# Patient Record
Sex: FEMALE | Race: WHITE | NOT HISPANIC OR LATINO | Employment: FULL TIME | URBAN - METROPOLITAN AREA
[De-identification: names, ages, dates, MRNs, and addresses within clinical notes are randomized per-mention and may not be internally consistent; named-entity substitution may affect disease eponyms.]

---

## 2017-04-18 ENCOUNTER — HOSPITAL ENCOUNTER (EMERGENCY)
Facility: MEDICAL CENTER | Age: 27
End: 2017-04-18
Attending: EMERGENCY MEDICINE
Payer: MEDICAID

## 2017-04-18 ENCOUNTER — APPOINTMENT (OUTPATIENT)
Dept: RADIOLOGY | Facility: MEDICAL CENTER | Age: 27
End: 2017-04-18
Attending: EMERGENCY MEDICINE
Payer: MEDICAID

## 2017-04-18 VITALS
WEIGHT: 204.81 LBS | SYSTOLIC BLOOD PRESSURE: 133 MMHG | HEART RATE: 83 BPM | TEMPERATURE: 99.2 F | DIASTOLIC BLOOD PRESSURE: 67 MMHG | OXYGEN SATURATION: 95 % | HEIGHT: 70 IN | BODY MASS INDEX: 29.32 KG/M2 | RESPIRATION RATE: 18 BRPM

## 2017-04-18 DIAGNOSIS — R56.9 SEIZURE (HCC): ICD-10-CM

## 2017-04-18 LAB
ALBUMIN SERPL BCP-MCNC: 4.3 G/DL (ref 3.2–4.9)
ALBUMIN/GLOB SERPL: 1.2 G/DL
ALP SERPL-CCNC: 65 U/L (ref 30–99)
ALT SERPL-CCNC: 24 U/L (ref 2–50)
ANION GAP SERPL CALC-SCNC: 8 MMOL/L (ref 0–11.9)
APPEARANCE UR: CLEAR
AST SERPL-CCNC: 30 U/L (ref 12–45)
BASOPHILS # BLD AUTO: 0.8 % (ref 0–1.8)
BASOPHILS # BLD: 0.09 K/UL (ref 0–0.12)
BILIRUB SERPL-MCNC: 1.1 MG/DL (ref 0.1–1.5)
BILIRUB UR QL STRIP.AUTO: NEGATIVE
BUN SERPL-MCNC: 10 MG/DL (ref 8–22)
CALCIUM SERPL-MCNC: 9.1 MG/DL (ref 8.4–10.2)
CHLORIDE SERPL-SCNC: 102 MMOL/L (ref 96–112)
CO2 SERPL-SCNC: 24 MMOL/L (ref 20–33)
COLOR UR: YELLOW
CREAT SERPL-MCNC: 0.79 MG/DL (ref 0.5–1.4)
CULTURE IF INDICATED INDCX: NO UA CULTURE
EKG IMPRESSION: NORMAL
EOSINOPHIL # BLD AUTO: 0.18 K/UL (ref 0–0.51)
EOSINOPHIL NFR BLD: 1.6 % (ref 0–6.9)
ERYTHROCYTE [DISTWIDTH] IN BLOOD BY AUTOMATED COUNT: 39.5 FL (ref 35.9–50)
GFR SERPL CREATININE-BSD FRML MDRD: >60 ML/MIN/1.73 M 2
GLOBULIN SER CALC-MCNC: 3.5 G/DL (ref 1.9–3.5)
GLUCOSE SERPL-MCNC: 101 MG/DL (ref 65–99)
GLUCOSE UR STRIP.AUTO-MCNC: NEGATIVE MG/DL
HCG SERPL QL: NEGATIVE
HCT VFR BLD AUTO: 42.1 % (ref 37–47)
HGB BLD-MCNC: 14.5 G/DL (ref 12–16)
IMM GRANULOCYTES # BLD AUTO: 0.03 K/UL (ref 0–0.11)
IMM GRANULOCYTES NFR BLD AUTO: 0.3 % (ref 0–0.9)
KETONES UR STRIP.AUTO-MCNC: NEGATIVE MG/DL
LEUKOCYTE ESTERASE UR QL STRIP.AUTO: NEGATIVE
LYMPHOCYTES # BLD AUTO: 2.91 K/UL (ref 1–4.8)
LYMPHOCYTES NFR BLD: 25.8 % (ref 22–41)
MCH RBC QN AUTO: 30.5 PG (ref 27–33)
MCHC RBC AUTO-ENTMCNC: 34.4 G/DL (ref 33.6–35)
MCV RBC AUTO: 88.4 FL (ref 81.4–97.8)
MICRO URNS: NORMAL
MONOCYTES # BLD AUTO: 1.11 K/UL (ref 0–0.85)
MONOCYTES NFR BLD AUTO: 9.8 % (ref 0–13.4)
NEUTROPHILS # BLD AUTO: 6.95 K/UL (ref 2–7.15)
NEUTROPHILS NFR BLD: 61.7 % (ref 44–72)
NITRITE UR QL STRIP.AUTO: NEGATIVE
NRBC # BLD AUTO: 0 K/UL
NRBC BLD AUTO-RTO: 0 /100 WBC
PH UR STRIP.AUTO: 5.5 [PH]
PLATELET # BLD AUTO: 395 K/UL (ref 164–446)
PMV BLD AUTO: 10.1 FL (ref 9–12.9)
POTASSIUM SERPL-SCNC: 5 MMOL/L (ref 3.6–5.5)
PROT SERPL-MCNC: 7.8 G/DL (ref 6–8.2)
PROT UR QL STRIP: NEGATIVE MG/DL
RBC # BLD AUTO: 4.76 M/UL (ref 4.2–5.4)
RBC UR QL AUTO: NEGATIVE
SODIUM SERPL-SCNC: 134 MMOL/L (ref 135–145)
SP GR UR STRIP.AUTO: <=1.005
TROPONIN I SERPL-MCNC: <0.02 NG/ML (ref 0–0.04)
WBC # BLD AUTO: 11.3 K/UL (ref 4.8–10.8)

## 2017-04-18 PROCEDURE — 93005 ELECTROCARDIOGRAM TRACING: CPT

## 2017-04-18 PROCEDURE — 80053 COMPREHEN METABOLIC PANEL: CPT

## 2017-04-18 PROCEDURE — 70450 CT HEAD/BRAIN W/O DYE: CPT

## 2017-04-18 PROCEDURE — 700105 HCHG RX REV CODE 258: Performed by: EMERGENCY MEDICINE

## 2017-04-18 PROCEDURE — 94760 N-INVAS EAR/PLS OXIMETRY 1: CPT

## 2017-04-18 PROCEDURE — 81003 URINALYSIS AUTO W/O SCOPE: CPT

## 2017-04-18 PROCEDURE — 96365 THER/PROPH/DIAG IV INF INIT: CPT

## 2017-04-18 PROCEDURE — 700111 HCHG RX REV CODE 636 W/ 250 OVERRIDE (IP): Performed by: EMERGENCY MEDICINE

## 2017-04-18 PROCEDURE — 85025 COMPLETE CBC W/AUTO DIFF WBC: CPT

## 2017-04-18 PROCEDURE — 96375 TX/PRO/DX INJ NEW DRUG ADDON: CPT

## 2017-04-18 PROCEDURE — 84484 ASSAY OF TROPONIN QUANT: CPT

## 2017-04-18 PROCEDURE — 99285 EMERGENCY DEPT VISIT HI MDM: CPT

## 2017-04-18 PROCEDURE — 36415 COLL VENOUS BLD VENIPUNCTURE: CPT

## 2017-04-18 PROCEDURE — 84703 CHORIONIC GONADOTROPIN ASSAY: CPT

## 2017-04-18 RX ORDER — SODIUM CHLORIDE 9 MG/ML
1000 INJECTION, SOLUTION INTRAVENOUS ONCE
Status: COMPLETED | OUTPATIENT
Start: 2017-04-18 | End: 2017-04-18

## 2017-04-18 RX ORDER — ACETAMINOPHEN 325 MG/1
650 TABLET ORAL EVERY 6 HOURS PRN
Status: SHIPPED | COMMUNITY
End: 2017-05-01

## 2017-04-18 RX ORDER — LEVETIRACETAM 500 MG/1
1000 TABLET ORAL 2 TIMES DAILY
Status: SHIPPED | COMMUNITY
End: 2018-05-07

## 2017-04-18 RX ORDER — VENLAFAXINE HYDROCHLORIDE 37.5 MG/1
37.5 CAPSULE, EXTENDED RELEASE ORAL EVERY MORNING
Status: SHIPPED | COMMUNITY
End: 2018-05-07

## 2017-04-18 RX ORDER — ONDANSETRON 2 MG/ML
4 INJECTION INTRAMUSCULAR; INTRAVENOUS ONCE
Status: COMPLETED | OUTPATIENT
Start: 2017-04-18 | End: 2017-04-18

## 2017-04-18 RX ADMIN — DEXTROSE MONOHYDRATE 500 MG: 50 INJECTION, SOLUTION INTRAVENOUS at 14:20

## 2017-04-18 RX ADMIN — ONDANSETRON 4 MG: 2 INJECTION, SOLUTION INTRAMUSCULAR; INTRAVENOUS at 13:25

## 2017-04-18 RX ADMIN — SODIUM CHLORIDE 1000 ML: 9 INJECTION, SOLUTION INTRAVENOUS at 13:24

## 2017-04-18 NOTE — ED NOTES
Med rec completed per pt at bedside with RX bottles  Bottles returned to pt's belongings   Allergies reviewed - NKDA  No ABX in last 30 days   Pt states she CANNOT have narcotics

## 2017-04-18 NOTE — DISCHARGE INSTRUCTIONS
Seizure, Adult  A seizure is abnormal electrical activity in the brain. Seizures usually last from 30 seconds to 2 minutes. There are various types of seizures.  Before a seizure, you may have a warning sensation (aura) that a seizure is about to occur. An aura may include the following symptoms:   · Fear or anxiety.  · Nausea.  · Feeling like the room is spinning (vertigo).  · Vision changes, such as seeing flashing lights or spots.  Common symptoms during a seizure include:  · A change in attention or behavior (altered mental status).  · Convulsions with rhythmic jerking movements.  · Drooling.  · Rapid eye movements.  · Grunting.  · Loss of bladder and bowel control.  · Bitter taste in the mouth.  · Tongue biting.  After a seizure, you may feel confused and sleepy. You may also have an injury resulting from convulsions during the seizure.  HOME CARE INSTRUCTIONS   · If you are given medicines, take them exactly as prescribed by your health care provider.  · Keep all follow-up appointments as directed by your health care provider.  · Do not swim or drive or engage in risky activity during which a seizure could cause further injury to you or others until your health care provider says it is OK.  · Get adequate rest.  · Teach friends and family what to do if you have a seizure. They should:  ¨ Lay you on the ground to prevent a fall.  ¨ Put a cushion under your head.  ¨ Loosen any tight clothing around your neck.  ¨ Turn you on your side. If vomiting occurs, this helps keep your airway clear.  ¨ Stay with you until you recover.  ¨ Know whether or not you need emergency care.  SEEK IMMEDIATE MEDICAL CARE IF:  · The seizure lasts longer than 5 minutes.  · The seizure is severe or you do not wake up immediately after the seizure.  · You have an altered mental status after the seizure.  · You are having more frequent or worsening seizures.  Someone should drive you to the emergency department or call local emergency  services (911 in [place]  MAKE SURE YOU:  · Understand these instructions.  · Will watch your condition.  · Will get help right away if you are not doing well or get worse.     This information is not intended to replace advice given to you by your health care provider. Make sure you discuss any questions you have with your health care provider.     Document Released: 12/15/2001 Document Revised: 01/08/2016 Document Reviewed: 07/30/2014  [employer] Interactive Patient Education ©2016 [employer]

## 2017-04-18 NOTE — ED PROVIDER NOTES
ED Provider Note    CHIEF COMPLAINT  Chief Complaint   Patient presents with   • Seizure     hx of same   • Headache     hx of migraine   • Shoulder Pain     left   • Nausea     this am       HPI  Oseas Day is a 26 y.o. female here for evaluation of seizures. The patient states that she recently was released from longterm one week ago, and is currently staying in a [facility] house. She states that she has had 2 seizures earlier today, but has a long history of seizures as well. She currently takes 1000 mg of Keppra twice a day for her seizures. She states that she did have some mild abrasions to the right side of her tongue, and one episode of mild incontinence. She did fall and strike her head on the ground, and does have a mild headache as well. She denies any paresthesias, vomiting, or fever. She came in via ambulance. Again, with a long history of the same.      PAST MEDICAL HISTORY   has a past medical history of Seizure disorder (CMS-HCC).    SOCIAL HISTORY  Social History     Social History Main Topics   • Smoking status: Former Smoker     Types: Cigarettes   • Smokeless tobacco: Not on file   • Alcohol Use: Yes      Comment: none in past 6 years    • Drug Use: Yes     Special: Intravenous      Comment: none in past 6 years   • Sexual Activity: Not on file       SURGICAL HISTORY  patient denies any surgical history    CURRENT MEDICATIONS  Home Medications     Reviewed by Ronald Howell (Pharmacy Tech) on 04/18/17 at 1245  Med List Status: Complete    Medication Last Dose Status    acetaminophen (TYLENOL) 325 MG Tab 4/18/2017 Active    levetiracetam (KEPPRA) 500 MG Tab 4/18/2017 Active    venlafaxine XR (EFFEXOR XR) 37.5 MG CAPSULE SR 24 HR 4/18/2017 Active                ALLERGIES  Allergies   Allergen Reactions   • Other Drug      Pt states NO NARCOTICS       REVIEW OF SYSTEMS  See HPI for further details. Review of systems as above, otherwise all other systems are negative.     PHYSICAL EXAM  VITAL  "SIGNS: /67 mmHg  Pulse 76  Temp(Src) 37.3 °C (99.2 °F)  Resp 14  Ht 1.778 m (5' 10\")  Wt 92.9 kg (204 lb 12.9 oz)  BMI 29.39 kg/m2  SpO2 96%  LMP 03/01/2017    Constitutional: Well appearing patient.  Not toxic, nor ill in appearance.  HEENT: NC/AT.  Extra Ocular Muscles Intact. Posterior pharynx clear, and without exudate. No uvula edema.  Right lateral tongue with superficial abrasion. No active bleeding  Neck: Full range of motion; non tender. no meningismus.  Cardiovascular: Regular heart rate and rhythm.  No murmurs, rubs, nor gallop appreciated.   Back:  Non tender midline.  No obvious step off or deformity.  Thorax & Lungs: Lungs are clear to auscultation with good air movement bilaterally.  No wheeze, rhonchi, nor rales.   Abdomen: Soft, with no tenderness, rebound nor guarding.  No mass, pulsatile mass, nor hepatosplenomegaly appreciated.  Skin: No purpura nor petechia noted.  No rash.  Extremities/Musculoskeletal: No sign of trauma.    Musculoskeletal: Range of motion is intact in all major joints.  Neurologic: Alert & oriented x 3.  CN II-XII grossly intact.   Strength and sensation is intact. Clear speech, appropriate, cooperative. Equal   Psychiatric: Normal affect appropriate for the clinical situation.    Results for orders placed or performed during the hospital encounter of 04/18/17   CBC WITH DIFFERENTIAL   Result Value Ref Range    WBC 11.3 (H) 4.8 - 10.8 K/uL    RBC 4.76 4.20 - 5.40 M/uL    Hemoglobin 14.5 12.0 - 16.0 g/dL    Hematocrit 42.1 37.0 - 47.0 %    MCV 88.4 81.4 - 97.8 fL    MCH 30.5 27.0 - 33.0 pg    MCHC 34.4 33.6 - 35.0 g/dL    RDW 39.5 35.9 - 50.0 fL    Platelet Count 395 164 - 446 K/uL    MPV 10.1 9.0 - 12.9 fL    Neutrophils-Polys 61.70 44.00 - 72.00 %    Lymphocytes 25.80 22.00 - 41.00 %    Monocytes 9.80 0.00 - 13.40 %    Eosinophils 1.60 0.00 - 6.90 %    Basophils 0.80 0.00 - 1.80 %    Immature Granulocytes 0.30 0.00 - 0.90 %    Nucleated RBC 0.00 /100 WBC    " Neutrophils (Absolute) 6.95 2.00 - 7.15 K/uL    Lymphs (Absolute) 2.91 1.00 - 4.80 K/uL    Monos (Absolute) 1.11 (H) 0.00 - 0.85 K/uL    Eos (Absolute) 0.18 0.00 - 0.51 K/uL    Baso (Absolute) 0.09 0.00 - 0.12 K/uL    Immature Granulocytes (abs) 0.03 0.00 - 0.11 K/uL    NRBC (Absolute) 0.00 K/uL   COMP METABOLIC PANEL   Result Value Ref Range    Sodium 134 (L) 135 - 145 mmol/L    Potassium 5.0 3.6 - 5.5 mmol/L    Chloride 102 96 - 112 mmol/L    Co2 24 20 - 33 mmol/L    Anion Gap 8.0 0.0 - 11.9    Glucose 101 (H) 65 - 99 mg/dL    Bun 10 8 - 22 mg/dL    Creatinine 0.79 0.50 - 1.40 mg/dL    Calcium 9.1 8.4 - 10.2 mg/dL    AST(SGOT) 30 12 - 45 U/L    ALT(SGPT) 24 2 - 50 U/L    Alkaline Phosphatase 65 30 - 99 U/L    Total Bilirubin 1.1 0.1 - 1.5 mg/dL    Albumin 4.3 3.2 - 4.9 g/dL    Total Protein 7.8 6.0 - 8.2 g/dL    Globulin 3.5 1.9 - 3.5 g/dL    A-G Ratio 1.2 g/dL   TROPONIN   Result Value Ref Range    Troponin I <0.02 0.00 - 0.04 ng/mL   ESTIMATED GFR   Result Value Ref Range    GFR If African American >60 >60 mL/min/1.73 m 2    GFR If Non African American >60 >60 mL/min/1.73 m 2   URINALYSIS CULTURE, IF INDICATED   Result Value Ref Range    Color Yellow     Character Clear     Specific Gravity <=1.005 <1.035    Ph 5.5 5.0-8.0    Glucose Negative Negative mg/dL    Ketones Negative Negative mg/dL    Protein Negative Negative mg/dL    Bilirubin Negative Negative    Nitrite Negative Negative    Leukocyte Esterase Negative Negative    Occult Blood Negative Negative    Micro Urine Req see below     Culture Indicated No UA Culture   EKG (ER)   Result Value Ref Range    Report       [facility] Emergency Dept.    Test Date:  2017  Pt Name:    ANATOLIY PALACIOS                 Department: E[facility]M  MRN:        [id number]                      [facility]  Gender:     F                            Technician: GREG  :        1990                   Requested By:ER TRIAGE PROTOCOL  Order #:     [id number]                    Reading MD:    Measurements  Intervals                                Axis  Rate:       66                           P:          43  MA:         148                          QRS:        62  QRSD:       84                           T:          26  QT:         392  QTc:        411    Interpretive Statements  SINUS RHYTHM  No previous ECG available for comparison           PROCEDURES     EKG  Normal sinus rhythm at a rate of 66. No ST elevations or depressions. No ectopy noted. As interpreted by me.     MEDICAL RECORD  I have reviewed patient's medical record and pertinent results are listed above.    COURSE & MEDICAL DECISION MAKING  I have reviewed any medical record information, laboratory studies and radiographic results as noted above.    Differential diagnoses include but not limited to: seizure, mass, ich    3:39 PM  At this time, the pt has been seizure free while in the ER.  I gave her an additional dose of keppra here, and she will continue her medications as prescribed.  The pt states that she missed her keppra dose yesterday.  She has a long standing history of seizures, and has been mostly compliant with medications.   She is comfortable going home, and will follow up.  She has no neuro deficits, comfortable, and afebrile.     This patient presents with seizure .  At this time, I have counseled the patient/family regarding their medications, pain control, and follow up.  They will continue their medications, if any, as prescribed.  They will return immediately for any worsening symptoms and/or any other medical concerns.  They will see their doctor, or contact the doctor provided, in 1-2 days for follow up.       FINAL IMPRESSION  1. Seizure (CMS-[facility])          Electronically signed by: Juarez Eubanks, 4/18/2017 1:44 PM

## 2017-04-18 NOTE — ED AVS SNAPSHOT
Billingstreet Access Code: [email or web address]  Expires: 5/18/2017  3:48 PM    Your email address is not on file at [facility].  Email Addresses are required for you to sign up for Billingstreet, please contact [phone] to verify your personal information and to provide your email address prior to attempting to register for Billingstreet.    Oseas Day  [address] [place] [address]    zoomsquaret  A secure, online tool to manage your health information     [employer]’s Billingstreet® is a secure, online tool that connects you to your personalized health information from the privacy of your home -- day or night - making it very easy for you to manage your healthcare. Once the activation process is completed, you can even access your medical information using the Billingstreet hailey, which is available for free in the Apple Hailey store or Google Play store.     To learn more about Billingstreet, visit [email or web address]    There are two levels of access available (as shown below):   My Chart Features  [facility] Primary Care Doctor [facility]  Specialists [facility]  Urgent  Care Non-[facility] Primary Care Doctor   Email your healthcare team securely and privately 24/7 X X X    Manage appointments: schedule your next appointment; view details of past/upcoming appointments X      Request prescription refills. X      View recent personal medical records, including lab and immunizations X X X X   View health record, including health history, allergies, medications X X X X   Read reports about your outpatient visits, procedures, consult and ER notes X X X X   See your discharge summary, which is a recap of your hospital and/or ER visit that includes your diagnosis, lab results, and care plan X X  X     How to register for zoomsquaret:  Once your e-mail address has been verified, follow the following steps to sign up for zoomsquaret.     1. Go to  [email or web address]  2. Click on the Sign Up Now box, which takes you to the New Member Sign Up page. You  will need to provide the following information:  a. Enter your [employer] Access Code exactly as it appears at the top of this page. (You will not need to use this code after you’ve completed the sign-up process. If you do not sign up before the expiration date, you must request a new code.)   b. Enter your date of birth.   c. Enter your home email address.   d. Click Submit, and follow the next screen’s instructions.  3. Create a [employer] ID. This will be your [employer] login ID and cannot be changed, so think of one that is secure and easy to remember.  4. Create a [employer] password. You can change your password at any time.  5. Enter your Password Reset Question and Answer. This can be used at a later time if you forget your password.   6. Enter your e-mail address. This allows you to receive e-mail notifications when new information is available in [employer].  7. Click Sign Up. You can now view your health information.    For assistance activating your [employer] account, call [phone]

## 2017-04-18 NOTE — ED AVS SNAPSHOT
4/18/2017    Oseas Day  [address] [address]  [place] [address]    Dear Oseas:    [facility] wants to ensure your discharge home is safe and you or your loved ones have had all of your questions answered regarding your care after you leave the hospital.    Below is a list of resources and contact information should you have any questions regarding your hospital stay, follow-up instructions, or active medical symptoms.    Questions or Concerns Regarding… Contact   Medical Questions Related to Your Discharge  (7 days a week, 8am-5pm) Contact a Nurse Care Coordinator   [phone]   Medical Questions Not Related to Your Discharge  (24 hours a day / 7 days a week)  Contact the Nurse Health Line   [phone]    Medications or Discharge Instructions Refer to your discharge packet   or contact your [facility] Primary Care Provider   [phone]   Follow-up Appointment(s) Schedule your appointment via "[employer]   or contact Scheduling [phone]   Billing Review your statement via "[employer]  or contact Billing [phone]   Medical Records Review your records via "[employer]   or contact Medical Records [phone]     You may receive a telephone call within two days of discharge. This call is to make certain you understand your discharge instructions and have the opportunity to have any questions answered. You can also easily access your medical information, test results and upcoming appointments via the "[employer] free online health management tool. You can learn more and sign up at [employer]. For assistance setting up your "[employer] account, please call [phone].    Once again, we want to ensure your discharge home is safe and that you have a clear understanding of any next steps in your care. If you have any questions or concerns, please do not hesitate to contact us, we are here for you. Thank you for choosing [facility] for your healthcare needs.    Sincerely,    Your [facility] Healthcare Team

## 2017-04-18 NOTE — ED AVS SNAPSHOT
Home Care Instructions                                                                                                                Oseas Day   MRN: [id number]    Department:  [facility], Emergency Dept   Date of Visit:  4/18/2017            [facility], Emergency Dept    [address]    [place] [address]    Phone:  [phone]      You were seen by     Juarez Eubanks D.O.      Your Diagnosis Was     Seizure (CMS-HCC)     R56.9       These are the medications you received during your hospitalization from 04/18/2017 1214 to 04/18/2017 1548     Date/Time Order Dose Route Action    04/18/2017 1324 NS infusion 1,000 mL 1,000 mL Intravenous New Bag    04/18/2017 1325 ondansetron (ZOFRAN) syringe/vial injection 4 mg 4 mg Intravenous Given    04/18/2017 1420 levetiracetam (KEPPRA) 500 mg in D5W 100 mL IVPB 500 mg Intravenous New Bag      Follow-up Information     1. Follow up with Kevin Beck M.D..    Specialty:  Neurology    Contact information    [address]  [place] [address] [phone]        Medication Information     Review all of your home medications and newly ordered medications with your primary doctor and/or pharmacist as soon as possible. Follow medication instructions as directed by your doctor and/or pharmacist.     Please keep your complete medication list with you and share with your physician. Update the information when medications are discontinued, doses are changed, or new medications (including over-the-counter products) are added; and carry medication information at all times in the event of emergency situations.               Medication List      ASK your doctor about these medications        Instructions    Morning Afternoon Evening Bedtime    acetaminophen 325 MG Tabs   Commonly known as:  TYLENOL        Take 650 mg by mouth every 6 hours as needed for Moderate Pain (headache).   Dose:  650 mg                        levetiracetam 500 MG Tabs   Commonly known as:  KEPPRA        Take 1,000 mg by mouth 2 times a day.   Dose:  1000 mg                        venlafaxine XR 37.5 MG Cp24   Commonly known as:  EFFEXOR XR        Take 37.5 mg by mouth every morning.   Dose:  37.5 mg                                Procedures and tests performed during your visit     BETA-HCG QUALITATIVE SERUM    CBC WITH DIFFERENTIAL    COMP METABOLIC PANEL    CT-HEAD W/O    EKG (ER)    ESTIMATED GFR    TROPONIN    URINALYSIS CULTURE, IF INDICATED        Discharge Instructions       Seizure, Adult  A seizure is abnormal electrical activity in the brain. Seizures usually last from 30 seconds to 2 minutes. There are various types of seizures.  Before a seizure, you may have a warning sensation (aura) that a seizure is about to occur. An aura may include the following symptoms:   · Fear or anxiety.  · Nausea.  · Feeling like the room is spinning (vertigo).  · Vision changes, such as seeing flashing lights or spots.  Common symptoms during a seizure include:  · A change in attention or behavior (altered mental status).  · Convulsions with rhythmic jerking movements.  · Drooling.  · Rapid eye movements.  · Grunting.  · Loss of bladder and bowel control.  · Bitter taste in the mouth.  · Tongue biting.  After a seizure, you may feel confused and sleepy. You may also have an injury resulting from convulsions during the seizure.  HOME CARE INSTRUCTIONS   · If you are given medicines, take them exactly as prescribed by your health care provider.  · Keep all follow-up appointments as directed by your health care provider.  · Do not swim or drive or engage in risky activity during which a seizure could cause further injury to you or others until your health care provider says it is OK.  · Get adequate rest.  · Teach friends and family what to do if you have a seizure. They should:  ¨ Lay you on the ground to prevent a fall.  ¨ Put a cushion under your head.  ¨ Loosen  any tight clothing around your neck.  ¨ Turn you on your side. If vomiting occurs, this helps keep your airway clear.  ¨ Stay with you until you recover.  ¨ Know whether or not you need emergency care.  SEEK IMMEDIATE MEDICAL CARE IF:  · The seizure lasts longer than 5 minutes.  · The seizure is severe or you do not wake up immediately after the seizure.  · You have an altered mental status after the seizure.  · You are having more frequent or worsening seizures.  Someone should drive you to the emergency department or call local emergency services (911 in [place]  MAKE SURE YOU:  · Understand these instructions.  · Will watch your condition.  · Will get help right away if you are not doing well or get worse.     This information is not intended to replace advice given to you by your health care provider. Make sure you discuss any questions you have with your health care provider.     Document Released: 12/15/2001 Document Revised: 01/08/2016 Document Reviewed: 07/30/2014  [facility] Interactive Patient Education ©2016 [facility] Inc[employer]            Patient Information     Patient Information    Following emergency treatment: all patient requiring follow-up care must return either to a private physician or a clinic if your condition worsens before you are able to obtain further medical attention, please return to the emergency room.     Billing Information    At [facility], we work to make the billing process streamlined for our patients.  Our Representatives are here to answer any questions you may have regarding your hospital bill.  If you have insurance coverage and have supplied your insurance information to us, we will submit a claim to your insurer on your behalf.  Should you have any questions regarding your bill, we can be reached online or by phone as follows:  Online: You are able pay your bills online or live chat with our representatives about any billing questions you may have. We are here to help Monday -  Friday from 8:00am to 7:30pm and 9:00am - 12:00pm on Saturdays.  Please visit [email or web address]  for more information.   Phone:  [phone] or [phone]    Please note that your emergency physician, surgeon, pathologist, radiologist, anesthesiologist, and other specialists are not employed by [facility] and will therefore bill separately for their services.  Please contact them directly for any questions concerning their bills at the numbers below:     Emergency Physician Services:  [phone]  [facility]:  [phone]  [facility]:  [phone]  [facility]:  [phone]    1. Your final bill may vary from the amount quoted upon discharge if all procedures are not complete at that time, or if your doctor has additional procedures of which we are not aware. You will receive an additional bill if you return to the Emergency Department at [facility] for suture removal regardless of the facility of which the sutures were placed.     2. Please arrange for settlement of this account at the emergency registration.    3. All self-pay accounts are due in full at the time of treatment.  If you are unable to meet this obligation then payment is expected within 4-5 days.     4. If you have had radiology studies (CT, X-ray, Ultrasound, MRI), you have received a preliminary result during your emergency department visit. Please contact the radiology department [phone] to receive a copy of your final result. Please discuss the Final result with your primary physician or with the follow up physician provided.     Crisis Hotline:  [place] Crisis Hotline:  6-115-IUJDCOH or 1-505.162.6864  [place] Crisis Hotline:    1-370.836.1987 or 391-119-5554         ED Discharge Follow Up Questions    1. In order to provide you with very good care, we would like to follow up with a phone call in the next few days.  May we have your permission  to contact you?     YES /  NO    2. What is the best phone number to call you? (       )_____-__________    3. What is the best time to call you?      Morning  /  Afternoon  /  Evening                   Patient Signature:  ____________________________________________________________    Date:  ____________________________________________________________

## 2017-04-18 NOTE — ED NOTES
Pt to er via [facility] after 2 witnessed seizures today. Pt states hx of same since birth, last previous seizure 5 months ago. Pt in narcotic rehab, no narcs x 6 years. fsbs per remsa=87, recvd zofran im in route for nausea. Pt states hx of migraines and believes that is what causes nausea and seizure

## 2017-04-21 ENCOUNTER — NON-PROVIDER VISIT (OUTPATIENT)
Dept: URGENT CARE | Facility: CLINIC | Age: 27
End: 2017-04-21

## 2017-04-21 DIAGNOSIS — Z11.1 PPD SCREENING TEST: ICD-10-CM

## 2017-04-21 PROCEDURE — 86580 TB INTRADERMAL TEST: CPT | Performed by: NURSE PRACTITIONER

## 2017-04-23 ENCOUNTER — NON-PROVIDER VISIT (OUTPATIENT)
Dept: URGENT CARE | Facility: CLINIC | Age: 27
End: 2017-04-23
Payer: MEDICAID

## 2017-04-23 DIAGNOSIS — Z11.1 ENCOUNTER FOR PPD SKIN TEST READING: ICD-10-CM

## 2017-04-23 LAB — TB WHEAL 3D P 5 TU DIAM: 0 MM

## 2017-05-01 ENCOUNTER — APPOINTMENT (OUTPATIENT)
Dept: RADIOLOGY | Facility: MEDICAL CENTER | Age: 27
End: 2017-05-01
Attending: EMERGENCY MEDICINE
Payer: MEDICAID

## 2017-05-01 ENCOUNTER — HOSPITAL ENCOUNTER (EMERGENCY)
Facility: MEDICAL CENTER | Age: 27
End: 2017-05-01
Attending: EMERGENCY MEDICINE
Payer: MEDICAID

## 2017-05-01 VITALS
DIASTOLIC BLOOD PRESSURE: 49 MMHG | RESPIRATION RATE: 16 BRPM | BODY MASS INDEX: 31.06 KG/M2 | TEMPERATURE: 98.2 F | OXYGEN SATURATION: 95 % | HEIGHT: 70 IN | HEART RATE: 61 BPM | WEIGHT: 216.93 LBS | SYSTOLIC BLOOD PRESSURE: 114 MMHG

## 2017-05-01 DIAGNOSIS — G43.109 MIGRAINE WITH AURA AND WITHOUT STATUS MIGRAINOSUS, NOT INTRACTABLE: ICD-10-CM

## 2017-05-01 DIAGNOSIS — R56.9 SEIZURES (HCC): ICD-10-CM

## 2017-05-01 DIAGNOSIS — J06.9 VIRAL UPPER RESPIRATORY TRACT INFECTION: ICD-10-CM

## 2017-05-01 LAB
ANION GAP SERPL CALC-SCNC: 5 MMOL/L (ref 0–11.9)
APPEARANCE UR: CLEAR
BASOPHILS # BLD AUTO: 0.8 % (ref 0–1.8)
BASOPHILS # BLD: 0.07 K/UL (ref 0–0.12)
BUN SERPL-MCNC: 9 MG/DL (ref 8–22)
CALCIUM SERPL-MCNC: 8.9 MG/DL (ref 8.4–10.2)
CHLORIDE SERPL-SCNC: 103 MMOL/L (ref 96–112)
CO2 SERPL-SCNC: 28 MMOL/L (ref 20–33)
COLOR UR: YELLOW
CREAT SERPL-MCNC: 0.88 MG/DL (ref 0.5–1.4)
EOSINOPHIL # BLD AUTO: 0.15 K/UL (ref 0–0.51)
EOSINOPHIL NFR BLD: 1.7 % (ref 0–6.9)
ERYTHROCYTE [DISTWIDTH] IN BLOOD BY AUTOMATED COUNT: 40.5 FL (ref 35.9–50)
FLUAV+FLUBV AG SPEC QL IA: NORMAL
GFR SERPL CREATININE-BSD FRML MDRD: >60 ML/MIN/1.73 M 2
GLUCOSE SERPL-MCNC: 101 MG/DL (ref 65–99)
GLUCOSE UR STRIP.AUTO-MCNC: NEGATIVE MG/DL
HCG SERPL QL: NEGATIVE
HCT VFR BLD AUTO: 38.6 % (ref 37–47)
HGB BLD-MCNC: 13.1 G/DL (ref 12–16)
IMM GRANULOCYTES # BLD AUTO: 0.03 K/UL (ref 0–0.11)
IMM GRANULOCYTES NFR BLD AUTO: 0.3 % (ref 0–0.9)
KETONES UR STRIP.AUTO-MCNC: NEGATIVE MG/DL
LEUKOCYTE ESTERASE UR QL STRIP.AUTO: NEGATIVE
LYMPHOCYTES # BLD AUTO: 2.55 K/UL (ref 1–4.8)
LYMPHOCYTES NFR BLD: 28.5 % (ref 22–41)
MCH RBC QN AUTO: 30.4 PG (ref 27–33)
MCHC RBC AUTO-ENTMCNC: 33.9 G/DL (ref 33.6–35)
MCV RBC AUTO: 89.6 FL (ref 81.4–97.8)
MONOCYTES # BLD AUTO: 1.13 K/UL (ref 0–0.85)
MONOCYTES NFR BLD AUTO: 12.6 % (ref 0–13.4)
NEUTROPHILS # BLD AUTO: 5.03 K/UL (ref 2–7.15)
NEUTROPHILS NFR BLD: 56.1 % (ref 44–72)
NITRITE UR QL STRIP.AUTO: NEGATIVE
NRBC # BLD AUTO: 0 K/UL
NRBC BLD AUTO-RTO: 0 /100 WBC
PH UR STRIP.AUTO: 7 [PH]
PLATELET # BLD AUTO: 330 K/UL (ref 164–446)
PMV BLD AUTO: 9.6 FL (ref 9–12.9)
POTASSIUM SERPL-SCNC: 3.9 MMOL/L (ref 3.6–5.5)
PROT UR QL STRIP: NEGATIVE MG/DL
RBC # BLD AUTO: 4.31 M/UL (ref 4.2–5.4)
RBC UR QL AUTO: NEGATIVE
SIGNIFICANT IND 70042: NORMAL
SITE SITE: NORMAL
SODIUM SERPL-SCNC: 136 MMOL/L (ref 135–145)
SOURCE SOURCE: NORMAL
SP GR UR STRIP.AUTO: 1.01
WBC # BLD AUTO: 9 K/UL (ref 4.8–10.8)

## 2017-05-01 PROCEDURE — 94760 N-INVAS EAR/PLS OXIMETRY 1: CPT

## 2017-05-01 PROCEDURE — 99284 EMERGENCY DEPT VISIT MOD MDM: CPT

## 2017-05-01 PROCEDURE — 85025 COMPLETE CBC W/AUTO DIFF WBC: CPT

## 2017-05-01 PROCEDURE — 700105 HCHG RX REV CODE 258: Performed by: EMERGENCY MEDICINE

## 2017-05-01 PROCEDURE — 71010 DX-CHEST-PORTABLE (1 VIEW): CPT

## 2017-05-01 PROCEDURE — 84703 CHORIONIC GONADOTROPIN ASSAY: CPT

## 2017-05-01 PROCEDURE — 87400 INFLUENZA A/B EACH AG IA: CPT

## 2017-05-01 PROCEDURE — 81002 URINALYSIS NONAUTO W/O SCOPE: CPT

## 2017-05-01 PROCEDURE — 80048 BASIC METABOLIC PNL TOTAL CA: CPT

## 2017-05-01 PROCEDURE — 96374 THER/PROPH/DIAG INJ IV PUSH: CPT

## 2017-05-01 PROCEDURE — 96375 TX/PRO/DX INJ NEW DRUG ADDON: CPT

## 2017-05-01 PROCEDURE — 700111 HCHG RX REV CODE 636 W/ 250 OVERRIDE (IP): Performed by: EMERGENCY MEDICINE

## 2017-05-01 RX ORDER — DIPHENHYDRAMINE HYDROCHLORIDE 50 MG/ML
25 INJECTION INTRAMUSCULAR; INTRAVENOUS ONCE
Status: COMPLETED | OUTPATIENT
Start: 2017-05-01 | End: 2017-05-01

## 2017-05-01 RX ORDER — SODIUM CHLORIDE 9 MG/ML
1000 INJECTION, SOLUTION INTRAVENOUS ONCE
Status: COMPLETED | OUTPATIENT
Start: 2017-05-01 | End: 2017-05-01

## 2017-05-01 RX ORDER — LEVETIRACETAM 500 MG/1
1500 TABLET ORAL 2 TIMES DAILY
Qty: 180 TAB | Refills: 0 | Status: SHIPPED | OUTPATIENT
Start: 2017-05-01 | End: 2017-05-31

## 2017-05-01 RX ORDER — ONDANSETRON 2 MG/ML
4 INJECTION INTRAMUSCULAR; INTRAVENOUS ONCE
Status: COMPLETED | OUTPATIENT
Start: 2017-05-01 | End: 2017-05-01

## 2017-05-01 RX ORDER — METOCLOPRAMIDE HYDROCHLORIDE 5 MG/ML
10 INJECTION INTRAMUSCULAR; INTRAVENOUS ONCE
Status: COMPLETED | OUTPATIENT
Start: 2017-05-01 | End: 2017-05-01

## 2017-05-01 RX ADMIN — DIPHENHYDRAMINE HYDROCHLORIDE 25 MG: 50 INJECTION, SOLUTION INTRAMUSCULAR; INTRAVENOUS at 15:58

## 2017-05-01 RX ADMIN — METOCLOPRAMIDE 10 MG: 5 INJECTION, SOLUTION INTRAMUSCULAR; INTRAVENOUS at 15:59

## 2017-05-01 RX ADMIN — SODIUM CHLORIDE 1000 ML: 9 INJECTION, SOLUTION INTRAVENOUS at 15:55

## 2017-05-01 RX ADMIN — ONDANSETRON 4 MG: 2 INJECTION INTRAMUSCULAR; INTRAVENOUS at 15:57

## 2017-05-01 ASSESSMENT — PAIN SCALES - GENERAL: PAINLEVEL_OUTOF10: 7

## 2017-05-01 NOTE — ED NOTES
assisting with care-pt med per order for h/a 6/10. In no apparent distress, talking on phone in darkened room

## 2017-05-01 NOTE — ED AVS SNAPSHOT
Microinox Access Code: [email or web address]  Expires: 5/18/2017  3:48 PM    Microinox  A secure, online tool to manage your health information     [employer] Microinox® is a secure, online tool that connects you to your personalized health information from the privacy of your home -- day or night - making it very easy for you to manage your healthcare. Once the activation process is completed, you can even access your medical information using the Microinox hailey, which is available for free in the Apple Hailey store or Google Play store.     Microinox provides the following levels of access (as shown below):   My Chart Features   [facility] Primary Care Doctor [facility]  Specialists [facility]  Urgent  Care Non-[facility]  Primary Care  Doctor   Email your healthcare team securely and privately 24/7 X X X X   Manage appointments: schedule your next appointment; view details of past/upcoming appointments X      Request prescription refills. X      View recent personal medical records, including lab and immunizations X X X X   View health record, including health history, allergies, medications X X X X   Read reports about your outpatient visits, procedures, consult and ER notes X X X X   See your discharge summary, which is a recap of your hospital and/or ER visit that includes your diagnosis, lab results, and care plan. X X       How to register for Microinox:  1. Go to  [email or web address].  2. Click on the Sign Up Now box, which takes you to the New Member Sign Up page. You will need to provide the following information:  a. Enter your Microinox Access Code exactly as it appears at the top of this page. (You will not need to use this code after you’ve completed the sign-up process. If you do not sign up before the expiration date, you must request a new code.)   b. Enter your date of birth.   c. Enter your home email address.   d. Click Submit, and follow the next screen’s instructions.  3. Create a Microinox ID. This will be your [employer]  login ID and cannot be changed, so think of one that is secure and easy to remember.  4. Create a [employer] password. You can change your password at any time.  5. Enter your Password Reset Question and Answer. This can be used at a later time if you forget your password.   6. Enter your e-mail address. This allows you to receive e-mail notifications when new information is available in [employer].  7. Click Sign Up. You can now view your health information.    For assistance activating your [employer] account, call [phone]

## 2017-05-01 NOTE — ED AVS SNAPSHOT
5/1/2017    Oseas Day  [address] [address]  [place] [address]    Dear Oseas:    [facility] wants to ensure your discharge home is safe and you or your loved ones have had all of your questions answered regarding your care after you leave the hospital.    Below is a list of resources and contact information should you have any questions regarding your hospital stay, follow-up instructions, or active medical symptoms.    Questions or Concerns Regarding… Contact   Medical Questions Related to Your Discharge  (7 days a week, 8am-5pm) Contact a Nurse Care Coordinator   [phone]   Medical Questions Not Related to Your Discharge  (24 hours a day / 7 days a week)  Contact the Nurse Health Line   [phone]    Medications or Discharge Instructions Refer to your discharge packet   or contact your [facility] Primary Care Provider   [phone]   Follow-up Appointment(s) Schedule your appointment via [employer]   or contact Scheduling [phone]   Billing Review your statement via [employer]  or contact Billing [phone]   Medical Records Review your records via [employer]   or contact Medical Records [phone]     You may receive a telephone call within two days of discharge. This call is to make certain you understand your discharge instructions and have the opportunity to have any questions answered. You can also easily access your medical information, test results and upcoming appointments via the [employer] free online health management tool. You can learn more and sign up at [employer]. For assistance setting up your [employer] account, please call [phone].    Once again, we want to ensure your discharge home is safe and that you have a clear understanding of any next steps in your care. If you have any questions or concerns, please do not hesitate to contact us, we are here for you. Thank you for choosing [facility] for your healthcare needs.    Sincerely,    Your [facility] Healthcare Team

## 2017-05-01 NOTE — ED PROVIDER NOTES
ED Provider Note  CHIEF COMPLAINT  Chief Complaint   Patient presents with   • Seizure   • Head Ache   • Body Aches       HPI  Oseas Day is a 26 y.o. female who presents after having 2 or 3 seizures today. Patient has a history of seizure disorder with epilepsy. Patient takes Keppra 1000 g twice a day for this. She states that she has not had any missed doses of her Keppra. She was alone today in her room sitting on the floor because she did not feel well. She states she has had a recent migraine for last 4 days. She also states that she is starting to get a cold with nasal congestion, and a nonproductive cough. She denies any fever. She states that she thinks she had 3 seizures. These were unwitnessed. She did bite her tongue on the right side and there is a small contusion there. She denies any active bleeding. She denies any loss of bowel or bladder function. She denies any pain at this point in time.    This headache came on gradually dislocated behind the left eye in the left side of her head. This is where she normally gets migraines. She did have a scotoma prior to the onset of his headache. Patient does state she is mildly light sensitive, and nauseated. She denies any difficulty speaking, difficulty moving, weakness, numbness. She states this headache feels like her previous migraines.    Patient denies any neck stiffness, fever, chills, vomiting, diarrhea, chest pain, shortness of breath,, numbness, weakness, difficulty speaking or visual changes.    REVIEW OF SYSTEMS  See HPI for further details. All other systems are negative.     PAST MEDICAL HISTORY   has a past medical history of Seizure disorder (CMS-[facility]).    SOCIAL HISTORY  Social History     Social History Main Topics   • Smoking status: Former Smoker     Types: Cigarettes   • Smokeless tobacco: Not on file   • Alcohol Use: Yes      Comment: none in past 6 years    • Drug Use: Yes     Special: Intravenous      Comment: none in past 6 years   •  "Sexual Activity: Not on file       SURGICAL HISTORY  patient denies any surgical history    CURRENT MEDICATIONS  Home Medications     Reviewed by Ronald Joseph (Pharmacy Tech) on 05/01/17 at 1458  Med List Status: Complete    Medication Last Dose Status    Ibuprofen (ADVIL MIGRAINE PO) > 2 weeks Active    levetiracetam (KEPPRA) 500 MG Tab 5/1/2017 Active    venlafaxine XR (EFFEXOR XR) 37.5 MG CAPSULE SR 24 HR 5/1/2017 Active                ALLERGIES  Allergies   Allergen Reactions   • Other Drug      Pt states NO NARCOTICS       PHYSICAL EXAM  VITAL SIGNS: /49 mmHg  Pulse 61  Temp(Src) 36.8 °C (98.2 °F)  Resp 16  Ht 1.778 m (5' 10\")  Wt 98.4 kg (216 lb 14.9 oz)  BMI 31.13 kg/m2  SpO2 95%  LMP 04/27/2017  Constitutional: Well developed, Well nourished, mild distress.   HENT: Normocephalic, Atraumatic, Oropharynx moist, No oral exudates. Patient does have a slight contusion to the right side of her tongue, and there is no abrasion or bleeding no laceration.  Eyes: Conjunctiva normal, No discharge. Pupils are 3 mm equal , EOMI   Neck: Supple, No stridor, no no meningismus  Cardiovascular: Normal heart rate, Normal rhythm, No murmurs, equal pulses.   Pulmonary: Normal breath sounds, No respiratory distress, No wheezing, No rales, No rhonchi.  Abdomen:Soft, No tenderness, no apparent loss of bowel or bladder function.   Back: No CVA tenderness.   Musculoskeletal: No major deformities noted, No tenderness.   Skin: Warm, Dry, No erythema, No rash.   Neurologic: Alert & oriented x 3, Normal motor function,  No focal deficits noted. No pronator drift, normal finger-nose, normal heel shin, 5 out of 5 strength bilaterally in upper and lower extremities, cranial nerves II through XII intact  Psychiatric: Affect normal, Judgment normal, Mood normal.         RADIOLOGY/PROCEDURES  DX-CHEST-PORTABLE (1 VIEW)   Final Result      No radiographic evidence of acute cardiopulmonary process.          Laboratory " tests  Results for orders placed or performed during the hospital encounter of 05/01/17   CBC WITH DIFFERENTIAL   Result Value Ref Range    WBC 9.0 4.8 - 10.8 K/uL    RBC 4.31 4.20 - 5.40 M/uL    Hemoglobin 13.1 12.0 - 16.0 g/dL    Hematocrit 38.6 37.0 - 47.0 %    MCV 89.6 81.4 - 97.8 fL    MCH 30.4 27.0 - 33.0 pg    MCHC 33.9 33.6 - 35.0 g/dL    RDW 40.5 35.9 - 50.0 fL    Platelet Count 330 164 - 446 K/uL    MPV 9.6 9.0 - 12.9 fL    Neutrophils-Polys 56.10 44.00 - 72.00 %    Lymphocytes 28.50 22.00 - 41.00 %    Monocytes 12.60 0.00 - 13.40 %    Eosinophils 1.70 0.00 - 6.90 %    Basophils 0.80 0.00 - 1.80 %    Immature Granulocytes 0.30 0.00 - 0.90 %    Nucleated RBC 0.00 /100 WBC    Neutrophils (Absolute) 5.03 2.00 - 7.15 K/uL    Lymphs (Absolute) 2.55 1.00 - 4.80 K/uL    Monos (Absolute) 1.13 (H) 0.00 - 0.85 K/uL    Eos (Absolute) 0.15 0.00 - 0.51 K/uL    Baso (Absolute) 0.07 0.00 - 0.12 K/uL    Immature Granulocytes (abs) 0.03 0.00 - 0.11 K/uL    NRBC (Absolute) 0.00 K/uL   BASIC METABOLIC PANEL   Result Value Ref Range    Sodium 136 135 - 145 mmol/L    Potassium 3.9 3.6 - 5.5 mmol/L    Chloride 103 96 - 112 mmol/L    Co2 28 20 - 33 mmol/L    Glucose 101 (H) 65 - 99 mg/dL    Bun 9 8 - 22 mg/dL    Creatinine 0.88 0.50 - 1.40 mg/dL    Calcium 8.9 8.4 - 10.2 mg/dL    Anion Gap 5.0 0.0 - 11.9   HCG Qual Serum   Result Value Ref Range    Beta-Hcg Qualitative Serum Negative Negative   INFLUENZA RAPID   Result Value Ref Range    Significant Indicator NEG     Source RESP     Site RESPIRATORY     Rapid Influenza A-B       Negative for Influenza A and Influenza B antigens.  Infection due to influenza A or B cannot be ruled out  since the antigen present in the specimen may be below the  detection limit of the test. Culture confirmation of  negative samples is recommended.     ESTIMATED GFR   Result Value Ref Range    GFR If African American >60 >60 mL/min/1.73 m 2    GFR If Non African American >60 >60 mL/min/1.73 m 2    POC UA   Result Value Ref Range    POC Color Yellow     POC Appearance Clear     POC Glucose Negative Negative mg/dL    POC Ketones Negative Negative mg/dL    POC Specific Gravity 1.015 1.005-1.030    POC Blood Negative Negative    POC Urine PH 7.0 5.0-8.0    POC Protein Negative Negative mg/dL    POC Nitrites Negative Negative    POC Leukocyte Esterase Negative Negative         COURSE & MEDICAL DECISION MAKING  Pertinent Labs & Imaging studies reviewed. (See chart for details)  Differential includes migraine, seizure disorder, viral upper respiratory tract infection, electrolyte abnormality. Patient be given a liter IV fluids, Zofran, Reglan and diphenhydramine to treat her migraine. Labs will be done to rule out infectious process as well as a chest x-ray and make sure that there is not a pneumonia.      Discussed the case with Dr. Kwok neurology. He recommended that the patient be started on 1500 mg of Keppra twice a day. And follow-up in their office. Tell them it's an ER follow-up.    Patient's migraine has gone away completely.    Medical decision-making at this point I think the patient most likely has seizures possibly secondary to a viral upper respiratory tract infection. She does not show any pneumonia, no electrolyte abnormalities or source of infections. Patient's migraine is normal for her normal migraine. It came on slowly and had an aura therefore do not think she needs CT of the head. Do not think this is subarachnoid hemorrhage or infectious process. Patient has no neck stiffness or fever. Do not think this is meningitis. As far as the patient's seizures she will increase her Keppra. I've discussed with her trying to follow up with a neurologist or finding a primary care physician.    FINAL IMPRESSION  1. Seizures (CMS-HCC)    2. Viral upper respiratory tract infection    3. Migraine with aura and without status migrainosus, not intractable               Electronically signed by: Altaf SOL  Margoth, 5/1/2017 3:01 PM    This record was made with a voice recognition software. The software is not perfect. I have tried to correct any grammar, spelling or context errors to the best of my ability, but errors may still remain. Interpretation of this chart should be taken in this context.

## 2017-05-01 NOTE — ED AVS SNAPSHOT
Home Care Instructions                                                                                                                Oseas Day   MRN: [id number]    Department:  [facility], Emergency Dept   Date of Visit:  5/1/2017            [facility], Emergency Dept    [address]    [place] [address]    Phone:  [phone]      You were seen by     Altaf Justin M.D.      Your Diagnosis Was     Seizures (CMS-HCC)     R56.9       These are the medications you received during your hospitalization from 05/01/2017 1445 to 05/01/2017 1725     Date/Time Order Dose Route Action    05/01/2017 1555 NS infusion 1,000 mL 1,000 mL Intravenous New Bag    05/01/2017 1557 ondansetron (ZOFRAN) syringe/vial injection 4 mg 4 mg Intravenous Given    05/01/2017 1559 metoclopramide (REGLAN) injection 10 mg 10 mg Intravenous Given    05/01/2017 1558 diphenhydrAMINE (BENADRYL) injection 25 mg 25 mg Intravenous Given      Follow-up Information     1. Follow up with Peterson Kwok M.D.. Schedule an appointment as soon as possible for a visit in 1 week.    Specialty:  Neurology    Why:  Tell them its an ER follow up.     Contact information    [address]  [address]  [place] [address] [phone]          2. Follow up with [facility].    Why:  If you need a doctor    Contact information    [address]  [place] [address]  [phone]          3. Follow up with [facility].    Why:  If you need a doctor    Contact information    [address] [address] [phone]      Medication Information     Review all of your home medications and newly ordered medications with your primary doctor and/or pharmacist as soon as possible. Follow medication instructions as directed by your doctor and/or pharmacist.     Please keep your complete medication list with you and share with your physician. Update the information when medications are  discontinued, doses are changed, or new medications (including over-the-counter products) are added; and carry medication information at all times in the event of emergency situations.               Medication List      ASK your doctor about these medications        Instructions    Morning Afternoon Evening Bedtime    ADVIL MIGRAINE PO        Take 2 Tabs by mouth as needed (For migraines).   Dose:  2 Tab                        * levetiracetam 500 MG Tabs   What changed:  Another medication with the same name was added. Make sure you understand how and when to take each.   Commonly known as:  KEPPRA   Ask about: Which instructions should I use?        Take 1,000 mg by mouth 2 times a day.   Dose:  1000 mg                        * levetiracetam 500 MG Tabs   What changed:  You were already taking a medication with the same name, and this prescription was added. Make sure you understand how and when to take each.   Commonly known as:  KEPPRA   Ask about: Which instructions should I use?        Take 3 Tabs by mouth 2 times a day for 30 days.   Dose:  1500 mg                        venlafaxine XR 37.5 MG Cp24   Commonly known as:  EFFEXOR XR        Take 37.5 mg by mouth every morning.   Dose:  37.5 mg                        * Notice:  This list has 2 medication(s) that are the same as other medications prescribed for you. Read the directions carefully, and ask your doctor or other care provider to review them with you.         Where to Get Your Medications      You can get these medications from any pharmacy     Bring a paper prescription for each of these medications    - levetiracetam 500 MG Tabs            Procedures and tests performed during your visit     BASIC METABOLIC PANEL    CBC WITH DIFFERENTIAL    DX-CHEST-PORTABLE (1 VIEW)    ESTIMATED GFR    HCG Qual Serum    INFLUENZA RAPID    IV Saline Lock    POC UA    Pulse Ox        Discharge Instructions       Return if you have multiple seizures in a row without  returning to baseline, seizure lasting greater than 5 minutes, fever, stroke symptoms, or weakness.      Seizure, Adult  A seizure is abnormal electrical activity in the brain. Seizures usually last from 30 seconds to 2 minutes. There are various types of seizures.  Before a seizure, you may have a warning sensation (aura) that a seizure is about to occur. An aura may include the following symptoms:   · Fear or anxiety.  · Nausea.  · Feeling like the room is spinning (vertigo).  · Vision changes, such as seeing flashing lights or spots.  Common symptoms during a seizure include:  · A change in attention or behavior (altered mental status).  · Convulsions with rhythmic jerking movements.  · Drooling.  · Rapid eye movements.  · Grunting.  · Loss of bladder and bowel control.  · Bitter taste in the mouth.  · Tongue biting.  After a seizure, you may feel confused and sleepy. You may also have an injury resulting from convulsions during the seizure.  HOME CARE INSTRUCTIONS   · If you are given medicines, take them exactly as prescribed by your health care provider.  · Keep all follow-up appointments as directed by your health care provider.  · Do not swim or drive or engage in risky activity during which a seizure could cause further injury to you or others until your health care provider says it is OK.  · Get adequate rest.  · Teach friends and family what to do if you have a seizure. They should:  · Lay you on the ground to prevent a fall.  · Put a cushion under your head.  · Loosen any tight clothing around your neck.  · Turn you on your side. If vomiting occurs, this helps keep your airway clear.  · Stay with you until you recover.  · Know whether or not you need emergency care.  SEEK IMMEDIATE MEDICAL CARE IF:  · The seizure lasts longer than 5 minutes.  · The seizure is severe or you do not wake up immediately after the seizure.  · You have an altered mental status after the seizure.  · You are having more frequent  or worsening seizures.  Someone should drive you to the emergency department or call local emergency services (911 in [place]  MAKE SURE YOU:  · Understand these instructions.  · Will watch your condition.  · Will get help right away if you are not doing well or get worse.     This information is not intended to replace advice given to you by your health care provider. Make sure you discuss any questions you have with your health care provider.     Document Released: 12/15/2001 Document Revised: 01/08/2016 Document Reviewed: 07/30/2014  [employer] Interactive Patient Education ©2016 [employer]        Migraine Headache  A migraine headache is very bad, throbbing pain on one or both sides of your head. Talk to your doctor about what things may bring on (trigger) your migraine headaches.  HOME CARE  · Only take medicines as told by your doctor.  · Lie down in a dark, quiet room when you have a migraine.  · Keep a journal to find out if certain things bring on migraine headaches. For example, write down:  · What you eat and drink.  · How much sleep you get.  · Any change to your diet or medicines.  · Lessen how much alcohol you drink.  · Quit smoking if you smoke.  · Get enough sleep.  · Lessen any stress in your life.  · Keep lights dim if bright lights bother you or make your migraines worse.  GET HELP RIGHT AWAY IF:   · Your migraine becomes really bad.  · You have a fever.  · You have a stiff neck.  · You have trouble seeing.  · Your muscles are weak, or you lose muscle control.  · You lose your balance or have trouble walking.  · You feel like you will pass out (faint), or you pass out.  · You have really bad symptoms that are different than your first symptoms.  MAKE SURE YOU:   · Understand these instructions.  · Will watch your condition.  · Will get help right away if you are not doing well or get worse.     This information is not intended to replace advice given to you by your health care provider. Make sure  you discuss any questions you have with your health care provider.     Document Released: 09/26/2009 Document Revised: 03/11/2013 Document Reviewed: 08/25/2014  [employer] Patient Education ©2016 [employer].        Viral Infections  A virus is a type of germ. Viruses can cause:  · Minor sore throats.  · Aches and pains.  · Headaches.  · Runny nose.  · Rashes.  · Watery eyes.  · Tiredness.  · Coughs.  · Loss of appetite.  · Feeling sick to your stomach (nausea).  · Throwing up (vomiting).  · Watery poop (diarrhea).  HOME CARE   · Only take medicines as told by your doctor.  · Drink enough water and fluids to keep your pee (urine) clear or pale yellow. Sports drinks are a good choice.  · Get plenty of rest and eat healthy. Soups and broths with crackers or rice are fine.  GET HELP RIGHT AWAY IF:   · You have a very bad headache.  · You have shortness of breath.  · You have chest pain or neck pain.  · You have an unusual rash.  · You cannot stop throwing up.  · You have watery poop that does not stop.  · You cannot keep fluids down.  · You or your child has a temperature by mouth above 102° F (38.9° C), not controlled by medicine.  · Your baby is older than 3 months with a rectal temperature of 102° F (38.9° C) or higher.  · Your baby is 3 months old or younger with a rectal temperature of 100.4° F (38° C) or higher.  MAKE SURE YOU:   · Understand these instructions.  · Will watch this condition.  · Will get help right away if you are not doing well or get worse.     This information is not intended to replace advice given to you by your health care provider. Make sure you discuss any questions you have with your health care provider.     Document Released: 11/30/2009 Document Revised: 03/11/2013 Document Reviewed: 04/24/2012  [employer] Patient Education ©2016 [employer] Inc.            Patient Information     Patient Information    Following emergency treatment: all patient requiring follow-up  care must return either to a private physician or a clinic if your condition worsens before you are able to obtain further medical attention, please return to the emergency room.     Billing Information    At [facility], we work to make the billing process streamlined for our patients.  Our Representatives are here to answer any questions you may have regarding your hospital bill.  If you have insurance coverage and have supplied your insurance information to us, we will submit a claim to your insurer on your behalf.  Should you have any questions regarding your bill, we can be reached online or by phone as follows:  Online: You are able pay your bills online or live chat with our representatives about any billing questions you may have. We are here to help Monday - Friday from 8:00am to 7:30pm and 9:00am - 12:00pm on Saturdays.  Please visit [email or web address]  for more information.   Phone:  [phone] or [phone]    Please note that your emergency physician, surgeon, pathologist, radiologist, anesthesiologist, and other specialists are not employed by [facility] and will therefore bill separately for their services.  Please contact them directly for any questions concerning their bills at the numbers below:     Emergency Physician Services:  [phone]  [facility]:  [phone]  [facility]:  [phone]  [facility]:  [phone]    1. Your final bill may vary from the amount quoted upon discharge if all procedures are not complete at that time, or if your doctor has additional procedures of which we are not aware. You will receive an additional bill if you return to the Emergency Department at [facility] for suture removal regardless of the facility of which the sutures were placed.     2. Please arrange for settlement of this account at the emergency registration.    3. All self-pay accounts are due in full at  the time of treatment.  If you are unable to meet this obligation then payment is expected within 4-5 days.     4. If you have had radiology studies (CT, X-ray, Ultrasound, MRI), you have received a preliminary result during your emergency department visit. Please contact the radiology department [phone] to receive a copy of your final result. Please discuss the Final result with your primary physician or with the follow up physician provided.     Crisis Hotline:  [place] Crisis Hotline:  8-977-PYTKPXN or 1-241.338.5122  [place] Crisis Hotline:    1-644.880.6349 or 121-793-4315         ED Discharge Follow Up Questions    1. In order to provide you with very good care, we would like to follow up with a phone call in the next few days.  May we have your permission to contact you?     YES /  NO    2. What is the best phone number to call you? (       )_____-__________    3. What is the best time to call you?      Morning  /  Afternoon  /  Evening                   Patient Signature:  ____________________________________________________________    Date:  ____________________________________________________________

## 2017-05-01 NOTE — ED NOTES
"Med rec updated and complete  Allergies reviewed  Pt states \"No antibiotics in the last 30 days\".  Pt states \"No vitamins\".      "

## 2017-05-02 NOTE — DISCHARGE INSTRUCTIONS
Return if you have multiple seizures in a row without returning to baseline, seizure lasting greater than 5 minutes, fever, stroke symptoms, or weakness.      Seizure, Adult  A seizure is abnormal electrical activity in the brain. Seizures usually last from 30 seconds to 2 minutes. There are various types of seizures.  Before a seizure, you may have a warning sensation (aura) that a seizure is about to occur. An aura may include the following symptoms:   · Fear or anxiety.  · Nausea.  · Feeling like the room is spinning (vertigo).  · Vision changes, such as seeing flashing lights or spots.  Common symptoms during a seizure include:  · A change in attention or behavior (altered mental status).  · Convulsions with rhythmic jerking movements.  · Drooling.  · Rapid eye movements.  · Grunting.  · Loss of bladder and bowel control.  · Bitter taste in the mouth.  · Tongue biting.  After a seizure, you may feel confused and sleepy. You may also have an injury resulting from convulsions during the seizure.  HOME CARE INSTRUCTIONS   · If you are given medicines, take them exactly as prescribed by your health care provider.  · Keep all follow-up appointments as directed by your health care provider.  · Do not swim or drive or engage in risky activity during which a seizure could cause further injury to you or others until your health care provider says it is OK.  · Get adequate rest.  · Teach friends and family what to do if you have a seizure. They should:  · Lay you on the ground to prevent a fall.  · Put a cushion under your head.  · Loosen any tight clothing around your neck.  · Turn you on your side. If vomiting occurs, this helps keep your airway clear.  · Stay with you until you recover.  · Know whether or not you need emergency care.  SEEK IMMEDIATE MEDICAL CARE IF:  · The seizure lasts longer than 5 minutes.  · The seizure is severe or you do not wake up immediately after the seizure.  · You have an altered mental  status after the seizure.  · You are having more frequent or worsening seizures.  Someone should drive you to the emergency department or call local emergency services (911 in [place]  MAKE SURE YOU:  · Understand these instructions.  · Will watch your condition.  · Will get help right away if you are not doing well or get worse.     This information is not intended to replace advice given to you by your health care provider. Make sure you discuss any questions you have with your health care provider.     Document Released: 12/15/2001 Document Revised: 01/08/2016 Document Reviewed: 07/30/2014  [employer] Interactive Patient Education ©2016 [employer]        Migraine Headache  A migraine headache is very bad, throbbing pain on one or both sides of your head. Talk to your doctor about what things may bring on (trigger) your migraine headaches.  HOME CARE  · Only take medicines as told by your doctor.  · Lie down in a dark, quiet room when you have a migraine.  · Keep a journal to find out if certain things bring on migraine headaches. For example, write down:  · What you eat and drink.  · How much sleep you get.  · Any change to your diet or medicines.  · Lessen how much alcohol you drink.  · Quit smoking if you smoke.  · Get enough sleep.  · Lessen any stress in your life.  · Keep lights dim if bright lights bother you or make your migraines worse.  GET HELP RIGHT AWAY IF:   · Your migraine becomes really bad.  · You have a fever.  · You have a stiff neck.  · You have trouble seeing.  · Your muscles are weak, or you lose muscle control.  · You lose your balance or have trouble walking.  · You feel like you will pass out (faint), or you pass out.  · You have really bad symptoms that are different than your first symptoms.  MAKE SURE YOU:   · Understand these instructions.  · Will watch your condition.  · Will get help right away if you are not doing well or get worse.     This information is not intended to replace  advice given to you by your health care provider. Make sure you discuss any questions you have with your health care provider.     Document Released: 09/26/2009 Document Revised: 03/11/2013 Document Reviewed: 08/25/2014  [employer] Patient Education ©2016 [employer] Inc.        Viral Infections  A virus is a type of germ. Viruses can cause:  · Minor sore throats.  · Aches and pains.  · Headaches.  · Runny nose.  · Rashes.  · Watery eyes.  · Tiredness.  · Coughs.  · Loss of appetite.  · Feeling sick to your stomach (nausea).  · Throwing up (vomiting).  · Watery poop (diarrhea).  HOME CARE   · Only take medicines as told by your doctor.  · Drink enough water and fluids to keep your pee (urine) clear or pale yellow. Sports drinks are a good choice.  · Get plenty of rest and eat healthy. Soups and broths with crackers or rice are fine.  GET HELP RIGHT AWAY IF:   · You have a very bad headache.  · You have shortness of breath.  · You have chest pain or neck pain.  · You have an unusual rash.  · You cannot stop throwing up.  · You have watery poop that does not stop.  · You cannot keep fluids down.  · You or your child has a temperature by mouth above 102° F (38.9° C), not controlled by medicine.  · Your baby is older than 3 months with a rectal temperature of 102° F (38.9° C) or higher.  · Your baby is 3 months old or younger with a rectal temperature of 100.4° F (38° C) or higher.  MAKE SURE YOU:   · Understand these instructions.  · Will watch this condition.  · Will get help right away if you are not doing well or get worse.     This information is not intended to replace advice given to you by your health care provider. Make sure you discuss any questions you have with your health care provider.     Document Released: 11/30/2009 Document Revised: 03/11/2013 Document Reviewed: 04/24/2012  [employer] Patient Education ©2016 [employer] Inc.

## 2017-05-02 NOTE — ED NOTES
Discharge information provided. Pt verbalized understanding of discharge instructions to follow up with PCP and to return to ER if condition worsens. Pt expressed the awareness of not driving or operating heavy machinery.   Partner at bedside. Patient to follow-up with referrals for neuro.  Patient educated on 1 new prescription. Pt ambulated out of ER in a steady gait.

## 2017-07-20 PROCEDURE — 99284 EMERGENCY DEPT VISIT MOD MDM: CPT

## 2017-07-21 ENCOUNTER — APPOINTMENT (OUTPATIENT)
Dept: RADIOLOGY | Facility: MEDICAL CENTER | Age: 27
End: 2017-07-21
Attending: EMERGENCY MEDICINE
Payer: MEDICAID

## 2017-07-21 ENCOUNTER — HOSPITAL ENCOUNTER (EMERGENCY)
Facility: MEDICAL CENTER | Age: 27
End: 2017-07-21
Attending: EMERGENCY MEDICINE
Payer: MEDICAID

## 2017-07-21 VITALS
BODY MASS INDEX: 31.5 KG/M2 | OXYGEN SATURATION: 96 % | WEIGHT: 220 LBS | TEMPERATURE: 98.2 F | RESPIRATION RATE: 18 BRPM | DIASTOLIC BLOOD PRESSURE: 68 MMHG | SYSTOLIC BLOOD PRESSURE: 131 MMHG | HEART RATE: 74 BPM | HEIGHT: 70 IN

## 2017-07-21 DIAGNOSIS — F41.8 SITUATIONAL ANXIETY: ICD-10-CM

## 2017-07-21 DIAGNOSIS — R56.9 SEIZURE (HCC): ICD-10-CM

## 2017-07-21 LAB
ALBUMIN SERPL BCP-MCNC: 3.9 G/DL (ref 3.2–4.9)
ALBUMIN/GLOB SERPL: 1.3 G/DL
ALP SERPL-CCNC: 67 U/L (ref 30–99)
ALT SERPL-CCNC: 24 U/L (ref 2–50)
ANION GAP SERPL CALC-SCNC: 5 MMOL/L (ref 0–11.9)
AST SERPL-CCNC: 16 U/L (ref 12–45)
BASOPHILS # BLD AUTO: 1 % (ref 0–1.8)
BASOPHILS # BLD: 0.17 K/UL (ref 0–0.12)
BILIRUB SERPL-MCNC: 0.2 MG/DL (ref 0.1–1.5)
BUN SERPL-MCNC: 15 MG/DL (ref 8–22)
CALCIUM SERPL-MCNC: 9.3 MG/DL (ref 8.5–10.5)
CHLORIDE SERPL-SCNC: 107 MMOL/L (ref 96–112)
CO2 SERPL-SCNC: 23 MMOL/L (ref 20–33)
CREAT SERPL-MCNC: 0.79 MG/DL (ref 0.5–1.4)
EOSINOPHIL # BLD AUTO: 0.67 K/UL (ref 0–0.51)
EOSINOPHIL NFR BLD: 4 % (ref 0–6.9)
ERYTHROCYTE [DISTWIDTH] IN BLOOD BY AUTOMATED COUNT: 43.2 FL (ref 35.9–50)
GFR SERPL CREATININE-BSD FRML MDRD: >60 ML/MIN/1.73 M 2
GLOBULIN SER CALC-MCNC: 3 G/DL (ref 1.9–3.5)
GLUCOSE SERPL-MCNC: 114 MG/DL (ref 65–99)
HCG SERPL QL: NEGATIVE
HCT VFR BLD AUTO: 41 % (ref 37–47)
HGB BLD-MCNC: 13.8 G/DL (ref 12–16)
LYMPHOCYTES # BLD AUTO: 6.05 K/UL (ref 1–4.8)
LYMPHOCYTES NFR BLD: 36 % (ref 22–41)
MANUAL DIFF BLD: NORMAL
MCH RBC QN AUTO: 30.3 PG (ref 27–33)
MCHC RBC AUTO-ENTMCNC: 33.7 G/DL (ref 33.6–35)
MCV RBC AUTO: 89.9 FL (ref 81.4–97.8)
MONOCYTES # BLD AUTO: 1.18 K/UL (ref 0–0.85)
MONOCYTES NFR BLD AUTO: 7 % (ref 0–13.4)
MORPHOLOGY BLD-IMP: NORMAL
MYELOCYTES NFR BLD MANUAL: 1 %
NEUTROPHILS # BLD AUTO: 8.57 K/UL (ref 2–7.15)
NEUTROPHILS NFR BLD: 50 % (ref 44–72)
NEUTS BAND NFR BLD MANUAL: 1 % (ref 0–10)
NRBC # BLD AUTO: 0 K/UL
NRBC BLD AUTO-RTO: 0 /100 WBC
PLATELET # BLD AUTO: 347 K/UL (ref 164–446)
PLATELET BLD QL SMEAR: NORMAL
PMV BLD AUTO: 9.8 FL (ref 9–12.9)
POTASSIUM SERPL-SCNC: 4.3 MMOL/L (ref 3.6–5.5)
PROT SERPL-MCNC: 6.9 G/DL (ref 6–8.2)
RBC # BLD AUTO: 4.56 M/UL (ref 4.2–5.4)
RBC BLD AUTO: NORMAL
SMUDGE CELLS BLD QL SMEAR: NORMAL
SODIUM SERPL-SCNC: 135 MMOL/L (ref 135–145)
VARIANT LYMPHS BLD QL SMEAR: NORMAL
WBC # BLD AUTO: 16.8 K/UL (ref 4.8–10.8)

## 2017-07-21 PROCEDURE — 80053 COMPREHEN METABOLIC PANEL: CPT

## 2017-07-21 PROCEDURE — 700102 HCHG RX REV CODE 250 W/ 637 OVERRIDE(OP): Performed by: EMERGENCY MEDICINE

## 2017-07-21 PROCEDURE — 85007 BL SMEAR W/DIFF WBC COUNT: CPT

## 2017-07-21 PROCEDURE — 84703 CHORIONIC GONADOTROPIN ASSAY: CPT

## 2017-07-21 PROCEDURE — 70450 CT HEAD/BRAIN W/O DYE: CPT

## 2017-07-21 PROCEDURE — 36415 COLL VENOUS BLD VENIPUNCTURE: CPT

## 2017-07-21 PROCEDURE — A9270 NON-COVERED ITEM OR SERVICE: HCPCS | Performed by: EMERGENCY MEDICINE

## 2017-07-21 PROCEDURE — 71010 DX-CHEST-PORTABLE (1 VIEW): CPT

## 2017-07-21 PROCEDURE — 85027 COMPLETE CBC AUTOMATED: CPT

## 2017-07-21 RX ORDER — HYDROCODONE BITARTRATE AND ACETAMINOPHEN 10; 325 MG/1; MG/1
1 TABLET ORAL ONCE
Status: COMPLETED | OUTPATIENT
Start: 2017-07-21 | End: 2017-07-21

## 2017-07-21 RX ORDER — PHENYTOIN SODIUM 100 MG/1
100 CAPSULE, EXTENDED RELEASE ORAL 3 TIMES DAILY
Qty: 90 CAP | Refills: 11 | Status: SHIPPED | OUTPATIENT
Start: 2017-07-21 | End: 2018-01-06

## 2017-07-21 RX ADMIN — HYDROCODONE BITARTRATE AND ACETAMINOPHEN 1 TABLET: 10; 325 TABLET ORAL at 00:54

## 2017-07-21 NOTE — ED NOTES
Reviewed discharge instructions, pt verbalized understanding of instructions and medication. States she will schedule follow-up appointment with neuro. Denies further questions at this time. Pt ambulatory out of ER with stable gait.

## 2017-07-21 NOTE — ED AVS SNAPSHOT
7/21/2017    Oseas Day  [address] [address]  [place] [address]    Dear Oseas:    [facility] wants to ensure your discharge home is safe and you or your loved ones have had all of your questions answered regarding your care after you leave the hospital.    Below is a list of resources and contact information should you have any questions regarding your hospital stay, follow-up instructions, or active medical symptoms.    Questions or Concerns Regarding… Contact   Medical Questions Related to Your Discharge  (7 days a week, 8am-5pm) Contact a Nurse Care Coordinator   [phone]   Medical Questions Not Related to Your Discharge  (24 hours a day / 7 days a week)  Contact the Nurse Health Line   [phone]    Medications or Discharge Instructions Refer to your discharge packet   or contact your [facility] Primary Care Provider   [phone]   Follow-up Appointment(s) Schedule your appointment via [employer]   or contact Scheduling [phone]   Billing Review your statement via [employer]  or contact Billing [phone]   Medical Records Review your records via [employer]   or contact Medical Records [phone]     You may receive a telephone call within two days of discharge. This call is to make certain you understand your discharge instructions and have the opportunity to have any questions answered. You can also easily access your medical information, test results and upcoming appointments via the [employer] free online health management tool. You can learn more and sign up at [employer]. For assistance setting up your [employer] account, please call [phone].    Once again, we want to ensure your discharge home is safe and that you have a clear understanding of any next steps in your care. If you have any questions or concerns, please do not hesitate to contact us, we are here for you. Thank you for choosing [facility] for your healthcare needs.    Sincerely,    Your [facility] Healthcare Team

## 2017-07-21 NOTE — ED AVS SNAPSHOT
Home Care Instructions                                                                                                                Oseas Day   MRN: [id number]    Department:  [facility], Emergency Dept   Date of Visit:  7/20/2017            [facility], Emergency Dept    [address]    [facility] [address]    Phone:  [phone]      You were seen by     Joseluis Guzmán M.D.      Your Diagnosis Was     Seizure (CMS-HCC)     R56.9       These are the medications you received during your hospitalization from 07/20/2017 1859 to 07/21/2017 0220     Date/Time Order Dose Route Action    07/21/2017 0054 hydrocodone/acetaminophen (NORCO)  MG per tablet 1 Tab 1 Tab Oral Given      Follow-up Information     1. Schedule an appointment as soon as possible for a visit with Tj Hoang M.D..    Specialty:  Neurology    Contact information    [address] [address] [phone]        Medication Information     Review all of your home medications and newly ordered medications with your primary doctor and/or pharmacist as soon as possible. Follow medication instructions as directed by your doctor and/or pharmacist.     Please keep your complete medication list with you and share with your physician. Update the information when medications are discontinued, doses are changed, or new medications (including over-the-counter products) are added; and carry medication information at all times in the event of emergency situations.               Medication List      START taking these medications        Instructions    Morning Afternoon Evening Bedtime    phenytoin  MG Caps   Commonly known as:  DILANTIN        Take 1 Cap by mouth 3 times a day.   Dose:  100 mg                          ASK your doctor about these medications        Instructions    Morning Afternoon Evening Bedtime    ADVIL MIGRAINE PO        Take 2 Tabs by mouth as needed (For  migraines).   Dose:  2 Tab                        levetiracetam 500 MG Tabs   Commonly known as:  KEPPRA        Take 1,000 mg by mouth 2 times a day.   Dose:  1000 mg                        venlafaxine XR 37.5 MG Cp24   Commonly known as:  EFFEXOR XR        Take 37.5 mg by mouth every morning.   Dose:  37.5 mg                             Where to Get Your Medications      These medications were sent to [facility] - [place], [place] - [address]  [address] [place] [address]     Phone:  [phone]    - phenytoin  MG Caps            Procedures and tests performed during your visit     CBC WITH DIFFERENTIAL    COMP METABOLIC PANEL    CT-HEAD W/O    DIFFERENTIAL MANUAL    DX-CHEST-PORTABLE (1 VIEW)    ESTIMATED GFR    HCG QUAL SERUM    MORPHOLOGY    PERIPHERAL SMEAR REVIEW    PLATELET ESTIMATE        Discharge Instructions       Epilepsy  People with epilepsy have times when they shake and jerk uncontrollably (seizures). This happens when there is a sudden change in brain function. Epilepsy may have many possible causes. Anything that disturbs the normal pattern of brain cell activity can lead to seizures.  HOME CARE   · Follow your doctor's instructions about driving and safety during normal activities.  · Get enough sleep.  · Only take medicine as told by your doctor.  · Avoid things that you know can cause you to have seizures (triggers).  · Write down when your seizures happen and what you remember about each seizure. Write down anything you think may have caused the seizure to happen.  · Tell the people you live and work with that you have seizures. Make sure they know how to help you. They should:  ¨ Cushion your head and body.  ¨ Turn you on your side.  ¨ Not restrain you.  ¨ Not place anything inside your mouth.  ¨ Call for local emergency medical help if there is any question about what has happened.  · Keep all follow-up visits with your doctor. This is very important.  GET  HELP IF:  · You get an infection or start to feel sick. You may have more seizures when you are sick.  · You are having seizures more often.  · Your seizure pattern is changing.  GET HELP RIGHT AWAY IF:   · A seizure does not stop after a few seconds or minutes.  · A seizure causes you to have trouble breathing.  · A seizure gives you a very bad headache.  · A seizure makes you unable to speak or use a part of your body.     This information is not intended to replace advice given to you by your health care provider. Make sure you discuss any questions you have with your health care provider.     Document Released: 10/15/2010 Document Revised: 10/08/2014 Document Reviewed: 07/30/2014  [employer] Interactive Patient Education ©2016 [employer]            Patient Information     Patient Information    Following emergency treatment: all patient requiring follow-up care must return either to a private physician or a clinic if your condition worsens before you are able to obtain further medical attention, please return to the emergency room.     Billing Information    At [facility], we work to make the billing process streamlined for our patients.  Our Representatives are here to answer any questions you may have regarding your hospital bill.  If you have insurance coverage and have supplied your insurance information to us, we will submit a claim to your insurer on your behalf.  Should you have any questions regarding your bill, we can be reached online or by phone as follows:  Online: You are able pay your bills online or live chat with our representatives about any billing questions you may have. We are here to help Monday - Friday from 8:00am to 7:30pm and 9:00am - 12:00pm on Saturdays.  Please visit [email or web address]  for more information.   Phone:  [phone] or [phone]    Please note that your emergency physician, surgeon, pathologist, radiologist, anesthesiologist,  and other specialists are not employed by [facility] and will therefore bill separately for their services.  Please contact them directly for any questions concerning their bills at the numbers below:     Emergency Physician Services:  [phone]  [facility]:  [phone]  [facility]:  [phone]  [facility]:  [phone]    1. Your final bill may vary from the amount quoted upon discharge if all procedures are not complete at that time, or if your doctor has additional procedures of which we are not aware. You will receive an additional bill if you return to the Emergency Department at [facility] for suture removal regardless of the facility of which the sutures were placed.     2. Please arrange for settlement of this account at the emergency registration.    3. All self-pay accounts are due in full at the time of treatment.  If you are unable to meet this obligation then payment is expected within 4-5 days.     4. If you have had radiology studies (CT, X-ray, Ultrasound, MRI), you have received a preliminary result during your emergency department visit. Please contact the radiology department [phone] to receive a copy of your final result. Please discuss the Final result with your primary physician or with the follow up physician provided.     Crisis Hotline:  [place] Crisis Hotline:  6-603-SBKHRLF or 1-149.443.2182  [place] Crisis Hotline:    1-772.744.5028 or 295-168-0568         ED Discharge Follow Up Questions    1. In order to provide you with very good care, we would like to follow up with a phone call in the next few days.  May we have your permission to contact you?     YES /  NO    2. What is the best phone number to call you? (       )_____-__________    3. What is the best time to call you?      Morning  /  Afternoon  /  Evening                   Patient Signature:  ____________________________________________________________    Date:   ____________________________________________________________

## 2017-07-21 NOTE — ED PROVIDER NOTES
"ED Provider Note    Scribed for Joseluis Guzmán M.D. by Francy Maher. 7/21/2017, 12:31 AM.    Primary care provider: Pcp Pt States None  Means of arrival: Walk-in  History obtained from: Patient  History limited by: None    CHIEF COMPLAINT  Chief Complaint   Patient presents with   • Seizure       HPI  Oseas Day is a 26 y.o. female with a history of seizures who presents to the Emergency Department for multiple seizures today. She states that she is compliant with her medications but has had 7 episodes within the past 24 hours. She describes her episodes as \"small compared to normal\". The patient endorses nausea at this time as well. She states that she was getting up to go to the bathroom to vomit and then she woke up on the ground. The patient complains of a hematoma to the right side of her forehead after losing consciousness earlier tonight and hitting her head on the ground. She reports biting the side of her tongue tonight during her episodes. Typically, the patient has one seizure a month in spite of her medications which make her feel sick. She notes that she experienced cold-like symptoms over the past couple weeks and continues to have a mild cough. The patient denies seeing a neurologist at this time or fever.     She reports being on multiple seizure medications in the past include Topamax, Dilantin, Keppra. She states the Keppra currently is giving her an acceptable side effects. She endorses a possible allergy and decrease effectiveness of the Topamax. She is unsure why the Dilantin did not work for her.    REVIEW OF SYSTEMS  See HPI, endorses headache, denies neck pain, confusion, numbness, weakness, shortness of breath. Remainder of ROS negative.   C    PAST MEDICAL HISTORY   has a past medical history of Seizure disorder (CMS-[facility]).    SURGICAL HISTORY  patient denies any surgical history    SOCIAL HISTORY  Social History   Substance Use Topics   • Smoking status: Former Smoker     Types: " "Cigarettes   • Smokeless tobacco: None   • Alcohol Use: Yes      Comment: none in past 6 years       History   Drug Use   • Yes   • Special: Intravenous     Comment: none in past 6 years       FAMILY HISTORY  No pertinent family history     CURRENT MEDICATIONS  Reviewed.  See Encounter Summary.     ALLERGIES  Allergies   Allergen Reactions   • Other Drug      Pt states NO NARCOTICS     PHYSICAL EXAM  VITAL SIGNS: /68 mmHg  Pulse 89  Temp(Src) 36.7 °C (98.1 °F)  Resp 16  Ht 1.778 m (5' 10\")  Wt 99.791 kg (220 lb)  BMI 31.57 kg/m2  SpO2 99%  Constitutional: Awake, alert in no apparent distress.  HENT: Normocephalic, Bilateral external ears normal. Nose normal. No apparent sign of trauma.No midline cervical tenderness, Nexus Criteria Negative.   Eyes: Conjunctiva normal, non-icteric, EOMI.    Neck:No midline tenderness  Thorax & Lungs: Easy unlabored respirations, Clear to ascultation bilaterally.  Cardiovascular: Regular rate, Regular rhythm, No murmurs, rubs or gallops.  Abdomen:  Soft, nontender, no masses   Skin: Visualized skin is  Dry, No erythema, No rash.   Extremities:   No cyanosis, clubbing or edema.  Neurologic: Alert, Grossly non-focal. CN II-XII intact. Sensation intact all extremities. 5/5 strength throughout. DTR 2+ at bilateral bicep, BR, patella. Negative rhonberg, no drift, no dysmetria. Heel to shin and rapid alternating movents executed normally. Normal speech  Psychiatric: Normal affect, Normal mood    DIAGNOSTIC STUDIES / PROCEDURES     RADIOLOGY  CT-HEAD W/O   Final Result      Head CT without contrast within normal limits. No evidence of acute cerebral infarction, hemorrhage or mass lesion.      DX-CHEST-PORTABLE (1 VIEW)   Final Result      No acute cardiac or pulmonary abnormalities are identified.        The radiologist's interpretation of all radiological studies have been reviewed by me.    COURSE & MEDICAL DECISION MAKING  Pertinent Labs & Imaging studies reviewed. (See chart " for details)    Differential diagnoses include but are not limited to: Seizure, pseudoseizure, electrolyte abnormalities, intracranial hemorrhage    12:31 AM - Patient seen and examined at bedside. Patient will be treated with Norco 10-325mg PO. Ordered Chest X-ray, CT Head, HCG Qual, CBC, CMP to evaluate her symptoms.     2:13 AM Recheck: Patient re-evaluated at beside. Discussed patient's condition and treatment plan including discharge and follow up. Patient's lab and radiology results discussed. The patient understood and is in agreement.     Decision Making:  This is a 26 y.o. year old female who presents with multiple seizures earlier today. She is not in status epilepticus. At this time she is alert and oriented, she is neurologically intact. Because of the reported headache and head trauma and head CT was conducted that is negative for intracranial hemorrhage. She does not have any electrolyte abnormalities, she does have a significant leukocytosis, this is not surprising given the number of seizures she had earlier today. At this time do not suspect serious bacterial infection. The patient is back to her baseline.    I will start her on Dilantin, I recommend she obtain follow-up with a neurologist if possible. She is given typical seizure precautions. She is recommended not to drive.    The patient's blood pressure is elevated today. >120/80. I have referred them to primary care for follow up.       DISPOSITION:  Patient will be discharged home in good condition.    Discharge Medications:  Discharge Medication List as of 7/21/2017  2:20 AM      START taking these medications    Details   phenytoin ER (DILANTIN) 100 MG Cap Take 1 Cap by mouth 3 times a day., Disp-90 Cap, R-11, Normal           The patient was discharged home (see d/c instructions) and told to return immediately for any signs or symptoms listed, or any worsening at all.  The patient verbally agreed to the discharge precautions and follow-up  plan which is documented in EPIC.    FINAL IMPRESSION  1. Seizure (CMS-HCC)    2. Situational anxiety        Francy MACIAS (Scribe), am scribing for, and in the presence of, Joseluis Guzmán M.D..    Electronically signed by: Francy Maher (Scribe), 7/21/2017    Joseluis MACIAS M.D. personally performed the services described in this documentation, as scribed by Francy Maher in my presence, and it is both accurate and complete.    The note accurately reflects work and decisions made by me.  Joseluis Guzmán  7/21/2017  4:35 AM

## 2017-07-21 NOTE — DISCHARGE INSTRUCTIONS
Epilepsy  People with epilepsy have times when they shake and jerk uncontrollably (seizures). This happens when there is a sudden change in brain function. Epilepsy may have many possible causes. Anything that disturbs the normal pattern of brain cell activity can lead to seizures.  HOME CARE   · Follow your doctor's instructions about driving and safety during normal activities.  · Get enough sleep.  · Only take medicine as told by your doctor.  · Avoid things that you know can cause you to have seizures (triggers).  · Write down when your seizures happen and what you remember about each seizure. Write down anything you think may have caused the seizure to happen.  · Tell the people you live and work with that you have seizures. Make sure they know how to help you. They should:  ¨ Cushion your head and body.  ¨ Turn you on your side.  ¨ Not restrain you.  ¨ Not place anything inside your mouth.  ¨ Call for local emergency medical help if there is any question about what has happened.  · Keep all follow-up visits with your doctor. This is very important.  GET HELP IF:  · You get an infection or start to feel sick. You may have more seizures when you are sick.  · You are having seizures more often.  · Your seizure pattern is changing.  GET HELP RIGHT AWAY IF:   · A seizure does not stop after a few seconds or minutes.  · A seizure causes you to have trouble breathing.  · A seizure gives you a very bad headache.  · A seizure makes you unable to speak or use a part of your body.     This information is not intended to replace advice given to you by your health care provider. Make sure you discuss any questions you have with your health care provider.     Document Released: 10/15/2010 Document Revised: 10/08/2014 Document Reviewed: 07/30/2014  [employer] Interactive Patient Education ©2016 [employer]

## 2017-07-21 NOTE — ED AVS SNAPSHOT
Join The Players Access Code: [email or web address]  Expires: 8/20/2017  2:20 AM    Join The Players  A secure, online tool to manage your health information     [employer]’s Join The Players® is a secure, online tool that connects you to your personalized health information from the privacy of your home -- day or night - making it very easy for you to manage your healthcare. Once the activation process is completed, you can even access your medical information using the Join The Players hailey, which is available for free in the Apple Hailey store or Google Play store.     Join The Players provides the following levels of access (as shown below):   My Chart Features   [facility] Primary Care Doctor [facility]  Specialists [facility]  Urgent  Care Non-[facility]  Primary Care  Doctor   Email your healthcare team securely and privately 24/7 X X X X   Manage appointments: schedule your next appointment; view details of past/upcoming appointments X      Request prescription refills. X      View recent personal medical records, including lab and immunizations X X X X   View health record, including health history, allergies, medications X X X X   Read reports about your outpatient visits, procedures, consult and ER notes X X X X   See your discharge summary, which is a recap of your hospital and/or ER visit that includes your diagnosis, lab results, and care plan. X X       How to register for Join The Players:  1. Go to  [email or web address].  2. Click on the Sign Up Now box, which takes you to the New Member Sign Up page. You will need to provide the following information:  a. Enter your Join The Players Access Code exactly as it appears at the top of this page. (You will not need to use this code after you’ve completed the sign-up process. If you do not sign up before the expiration date, you must request a new code.)   b. Enter your date of birth.   c. Enter your home email address.   d. Click Submit, and follow the next screen’s instructions.  3. Create a Join The Players ID. This will be your Join The Players  login ID and cannot be changed, so think of one that is secure and easy to remember.  4. Create a [employer] password. You can change your password at any time.  5. Enter your Password Reset Question and Answer. This can be used at a later time if you forget your password.   6. Enter your e-mail address. This allows you to receive e-mail notifications when new information is available in [employer].  7. Click Sign Up. You can now view your health information.    For assistance activating your [employer] account, call [phone]

## 2018-01-06 ENCOUNTER — HOSPITAL ENCOUNTER (EMERGENCY)
Facility: MEDICAL CENTER | Age: 28
End: 2018-01-06
Attending: EMERGENCY MEDICINE
Payer: MEDICAID

## 2018-01-06 VITALS
HEART RATE: 82 BPM | DIASTOLIC BLOOD PRESSURE: 82 MMHG | WEIGHT: 230 LBS | BODY MASS INDEX: 32.93 KG/M2 | OXYGEN SATURATION: 94 % | TEMPERATURE: 99.5 F | HEIGHT: 70 IN | RESPIRATION RATE: 16 BRPM | SYSTOLIC BLOOD PRESSURE: 120 MMHG

## 2018-01-06 DIAGNOSIS — R56.9 SEIZURE (HCC): ICD-10-CM

## 2018-01-06 LAB — PHENYTOIN SERPL-MCNC: <0.5 UG/ML (ref 10–20)

## 2018-01-06 PROCEDURE — 96374 THER/PROPH/DIAG INJ IV PUSH: CPT

## 2018-01-06 PROCEDURE — 700102 HCHG RX REV CODE 250 W/ 637 OVERRIDE(OP): Performed by: EMERGENCY MEDICINE

## 2018-01-06 PROCEDURE — 700105 HCHG RX REV CODE 258: Performed by: EMERGENCY MEDICINE

## 2018-01-06 PROCEDURE — 99284 EMERGENCY DEPT VISIT MOD MDM: CPT

## 2018-01-06 PROCEDURE — 80185 ASSAY OF PHENYTOIN TOTAL: CPT

## 2018-01-06 PROCEDURE — A9270 NON-COVERED ITEM OR SERVICE: HCPCS | Performed by: EMERGENCY MEDICINE

## 2018-01-06 PROCEDURE — 700111 HCHG RX REV CODE 636 W/ 250 OVERRIDE (IP): Performed by: EMERGENCY MEDICINE

## 2018-01-06 PROCEDURE — 96361 HYDRATE IV INFUSION ADD-ON: CPT

## 2018-01-06 PROCEDURE — 96375 TX/PRO/DX INJ NEW DRUG ADDON: CPT

## 2018-01-06 RX ORDER — SODIUM CHLORIDE 9 MG/ML
1000 INJECTION, SOLUTION INTRAVENOUS ONCE
Status: COMPLETED | OUTPATIENT
Start: 2018-01-06 | End: 2018-01-06

## 2018-01-06 RX ORDER — LEVETIRACETAM 500 MG/1
500 TABLET ORAL ONCE
Status: COMPLETED | OUTPATIENT
Start: 2018-01-06 | End: 2018-01-06

## 2018-01-06 RX ORDER — LEVETIRACETAM 500 MG/1
500 TABLET ORAL 2 TIMES DAILY
Qty: 60 TAB | Refills: 1 | Status: SHIPPED | OUTPATIENT
Start: 2018-01-06 | End: 2018-05-07

## 2018-01-06 RX ORDER — KETOROLAC TROMETHAMINE 30 MG/ML
30 INJECTION, SOLUTION INTRAMUSCULAR; INTRAVENOUS ONCE
Status: COMPLETED | OUTPATIENT
Start: 2018-01-06 | End: 2018-01-06

## 2018-01-06 RX ORDER — LORAZEPAM 2 MG/ML
1 INJECTION INTRAMUSCULAR ONCE
Status: COMPLETED | OUTPATIENT
Start: 2018-01-06 | End: 2018-01-06

## 2018-01-06 RX ORDER — PHENYTOIN SODIUM 100 MG/1
300 CAPSULE, EXTENDED RELEASE ORAL ONCE
Status: COMPLETED | OUTPATIENT
Start: 2018-01-06 | End: 2018-01-06

## 2018-01-06 RX ORDER — METOCLOPRAMIDE HYDROCHLORIDE 5 MG/ML
5-10 INJECTION INTRAMUSCULAR; INTRAVENOUS ONCE
Status: COMPLETED | OUTPATIENT
Start: 2018-01-06 | End: 2018-01-06

## 2018-01-06 RX ORDER — ONDANSETRON 2 MG/ML
4 INJECTION INTRAMUSCULAR; INTRAVENOUS ONCE
Status: COMPLETED | OUTPATIENT
Start: 2018-01-06 | End: 2018-01-06

## 2018-01-06 RX ADMIN — SODIUM CHLORIDE 1000 ML: 9 INJECTION, SOLUTION INTRAVENOUS at 21:29

## 2018-01-06 RX ADMIN — KETOROLAC TROMETHAMINE 30 MG: 30 INJECTION, SOLUTION INTRAMUSCULAR at 21:54

## 2018-01-06 RX ADMIN — ONDANSETRON 4 MG: 2 INJECTION INTRAMUSCULAR; INTRAVENOUS at 21:29

## 2018-01-06 RX ADMIN — METOCLOPRAMIDE 10 MG: 5 INJECTION, SOLUTION INTRAMUSCULAR; INTRAVENOUS at 22:44

## 2018-01-06 RX ADMIN — PHENYTOIN SODIUM 300 MG: 100 CAPSULE ORAL at 22:43

## 2018-01-06 RX ADMIN — LORAZEPAM 1 MG: 2 INJECTION INTRAMUSCULAR; INTRAVENOUS at 21:54

## 2018-01-06 RX ADMIN — LEVETIRACETAM 500 MG: 500 TABLET, FILM COATED ORAL at 23:32

## 2018-01-06 ASSESSMENT — PAIN SCALES - GENERAL: PAINLEVEL_OUTOF10: 7

## 2018-01-07 NOTE — ED PROVIDER NOTES
"ED Provider Note    Scribed for Wallace Sullivan M.D. by Mike Moreno. 1/6/2018  8:43 PM    Primary care provider: Pcp Pt States None  Means of arrival: ambulance  History obtained from: patient  History limited by: none    CHIEF COMPLAINT  Chief Complaint   Patient presents with   • Seizure     x3 witnessed by SO   • Nausea       HPI  Oseas Day is a 27 y.o. female who presents to the Emergency Department for evaluation status post multiple seizures that occurred today. Per patient, she has been nauseated with associated vomiting and migraine headaches since noon today. Tonight, the patient reports she was at a meeting with her significant other when she experienced a \"burning\" pain starting behind her right ear that radiated down her right arm. She reports she usually experiences that exact pain when she is about to have a seizure, which prompted her to walk outside for some air. Patient states while she was outside, she had a full tonic-clonic seizure that was witnessed by her significant other. The patient states she had two similar seizures since. Her seizures range from 1-2 minutes. She endorses associated urinary incontinence. The patient does not note any exacerbating or alleviating factors. She denies fevers, oral trauma, chest pain, shortness of breath.       Patient confirms a history of seizures, and she was placed on Dilantin and taken off Keppra by an ERP during her last visit because the Keppra was not working for her. She reports she is complaint with her medications, and she usually takes two Dilantin every night. However, she does note she may have vomited up her medication today. Patient states she is not followed by neurology.       REVIEW OF SYSTEMS  Pertinent positives include nausea, migraine headaches, multiple seizures, urinary incontinence. Pertinent negatives include no fevers, vomiting, oral trauma, chest pain, shortness of breath.  All other systems reviewed and " "negative.    C.      PAST MEDICAL HISTORY   has a past medical history of Seizure disorder (CMS-HCC).    SURGICAL HISTORY  patient denies any surgical history    SOCIAL HISTORY  Social History   Substance Use Topics   • Smoking status: Former Smoker     Types: Cigarettes   • Smokeless tobacco: Never Used   • Alcohol use Yes      Comment: none in past 6 years       History   Drug Use   • Types: Intravenous     Comment: none in past 6 years       FAMILY HISTORY  History reviewed. No pertinent family history.    CURRENT MEDICATIONS  No current facility-administered medications on file prior to encounter.      Current Outpatient Prescriptions on File Prior to Encounter   Medication Sig Dispense Refill   • phenytoin ER (DILANTIN) 100 MG Cap Take 1 Cap by mouth 3 times a day. 90 Cap 11   • Ibuprofen (ADVIL MIGRAINE PO) Take 2 Tabs by mouth as needed (For migraines).     • venlafaxine XR (EFFEXOR XR) 37.5 MG CAPSULE SR 24 HR Take 37.5 mg by mouth every morning.     • levetiracetam (KEPPRA) 500 MG Tab Take 1,000 mg by mouth 2 times a day.        ALLERGIES  Allergies   Allergen Reactions   • Other Drug      Pt states NO NARCOTICS       PHYSICAL EXAM  VITAL SIGNS: /82   Pulse 87   Temp 37.5 °C (99.5 °F)   Resp 18   Ht 1.778 m (5' 10\")   Wt 104.3 kg (230 lb)   SpO2 98%   BMI 33.00 kg/m²     Vital signs reviewed.  Constitutional: [place]ing comfortably in the [place]. Well-appearing, well nourished.   Head: Normocephalic. Atraumatic.   Mouth/Throat: No oral trauma. Oropharynx moist.   Neck: Normal range of motion.  Cardiovascular: Regular rate, regular rhythm.   Pulmonary/Chest: Clear to auscultation bilaterally. Normal breath sounds.   Abdominal: Soft, no tenderness. No masses.   Musculoskeletal: Normal range of motion in all major joints. No deformities noted.   Lymphadenopathy: No lymphadenopathy.   Neurological: Normal strength and sensation to all extremities. No focal deficits noted.   Skin: Warm, dry. No rash. "   Psychiatric: Awake, alert and oriented x3.     LABS  Results for orders placed or performed during the hospital encounter of 01/06/18   DILANTIN   Result Value Ref Range    Phenytoin <0.5 (L) 10.0 - 20.0 ug/mL      All labs reviewed by me.      COURSE & MEDICAL DECISION MAKING  Pertinent Labs & Imaging studies reviewed. (See chart for details)     The patient's [facility] and past medical records were reviewed, and show the patient has been seen in the ED three times in 2017 for seizures.     8:43 PM - Patient seen and examined at bedside. Patient will be treated with 1000 mL NS infusion, 4 mg Zofran, 5 mg Reglan, 30 mg Toradol, 1 mg Ativan. The patient is treated with IV fluids secondary to concerns for dehydration from nausea and vomiting. Ordered Dilantin to evaluate her symptoms. I discussed the plan as above with the patient. She understood and verbalized agreement.       The differential diagnoses include but are not limited to:Recurrent seizures    10:27 PM - The patient will be treated with 300 mg Dilantin. The patient was then given Keppra. The patient would prefer to be started on Keppra 10:56 PM - I reevaluated the patient at bedside. The patient is resting comfortably in the gurney. I updated the patient on her lab results as above. Patient was instructed to follow-up with Neurology. However, since it may take up to three months for patient to establish care with a neurologist because of her insurance, she was instructed to follow-up with her primary care provider regarding her seizures. Patient states she should be established with a PCP within the next two weeks.     The patient states she does not like the way Dilantin makes her feel, so I will discharge the patient with a prescription for Keppra. She was given dosing instructions for Keppra. Patient will be given a dose of Keppra in the ED prior to discharge. She was given return precautions and welcomed to return to the ED with new or  worsening symptoms. The patient understood and verbalized agreement.      The patient will return for new or worsening symptoms and is stable at the time of discharge.      DISPOSITION:  Patient will be discharged home in stable condition.    FOLLOW UP:  [address] [address]  [phone]  In 1 week      [facility]  [address] [facility], [address]  [facility] [id number] [phone]  In 2 weeks        OUTPATIENT MEDICATIONS:  New Prescriptions    LEVETIRACETAM (KEPPRA) 500 MG TAB    Take 1 Tab by mouth 2 times a day. Take one tablet by mouth 2 times a day for 2 weeks. Then increase her dose to 2 tablets twice a day.        FINAL IMPRESSION  1. Seizure (CMS-HCC)          Mike MACIAS (Scribe), am scribing for, and in the presence of, Wallace Sullivan M.D..    Electronically signed by: Mike Moreno (Scribe), 1/6/2018    IWallace M.D. personally performed the services described in this documentation, as scribed by Mike Moreno in my presence, and it is both accurate and complete.    The note accurately reflects work and decisions made by me.  Wallace Sullivan  1/6/2018  11:16 PM

## 2018-01-07 NOTE — ED NOTES
"Oseas [place] Barb  Chief Complaint   Patient presents with   • Seizure     x3 witnessed by SO   • Nausea       Pt BIBA for above complaints.  Pt had 3 witnessed seizures today.  Pmh: seizure and asthma.  Pt currently taking dilantin for seizures and is compliant with medication.  Pt was assisted to ground by friend, denies hitting head.  Seizures ranged from 1-2 min +incontinence -oral trauma +nausea. AOX3 on scene, currently AOx4. FSBS 76    Blood Pressure: 120/82, Pulse: 87, Respiration: 18, Temperature: 37.5 °C (99.5 °F), Height: 177.8 cm (5' 10\"), Weight: 104.3 kg (230 lb), BMI (Calculated): 33, BSA (Calculated): 2.3, Pulse Oximetry: 98 %, O2 Delivery: None (Room Air)    "

## 2018-01-07 NOTE — DISCHARGE INSTRUCTIONS
Driving and Equipment Restrictions  Some medical problems make it dangerous to drive, ride a bike, or use machines. Some of these problems are:  · A hard blow to the head (concussion).  · Passing out (fainting).  · Twitching and shaking (seizures).  · Low blood sugar.  · Taking medicine to help you relax (sedatives).  · Taking pain medicines.  · Wearing an eye patch.  · Wearing splints. This can make it hard to use parts of your body that you need to drive safely.  HOME CARE   · Do not drive until your doctor says it is okay.  · Do not use machines until your doctor says it is okay.  You may need a form signed by your doctor (medical release) before you can drive again. You may also need this form before you do other tasks where you need to be fully alert.  MAKE SURE YOU:  · Understand these instructions.  · Will watch your condition.  · Will get help right away if you are not doing well or get worse.     This information is not intended to replace advice given to you by your health care provider. Make sure you discuss any questions you have with your health care provider.     Document Released: 01/25/2006 Document Revised: 03/11/2013 Document Reviewed: 04/27/2011  [employer] Interactive Patient Education ©2016 [employer]      Epilepsy  People with epilepsy have times when they shake and jerk uncontrollably (seizures). This happens when there is a sudden change in brain function. Epilepsy may have many possible causes. Anything that disturbs the normal pattern of brain cell activity can lead to seizures.  HOME CARE   · Follow your doctor's instructions about driving and safety during normal activities.  · Get enough sleep.  · Only take medicine as told by your doctor.  · Avoid things that you know can cause you to have seizures (triggers).  · Write down when your seizures happen and what you remember about each seizure. Write down anything you think may have caused the seizure to happen.  · Tell the people you live  and work with that you have seizures. Make sure they know how to help you. They should:  ¨ Cushion your head and body.  ¨ Turn you on your side.  ¨ Not restrain you.  ¨ Not place anything inside your mouth.  ¨ Call for local emergency medical help if there is any question about what has happened.  · Keep all follow-up visits with your doctor. This is very important.  GET HELP IF:  · You get an infection or start to feel sick. You may have more seizures when you are sick.  · You are having seizures more often.  · Your seizure pattern is changing.  GET HELP RIGHT AWAY IF:   · A seizure does not stop after a few seconds or minutes.  · A seizure causes you to have trouble breathing.  · A seizure gives you a very bad headache.  · A seizure makes you unable to speak or use a part of your body.     This information is not intended to replace advice given to you by your health care provider. Make sure you discuss any questions you have with your health care provider.     Document Released: 10/15/2010 Document Revised: 10/08/2014 Document Reviewed: 07/30/2014  [employer] Interactive Patient Education ©2016 [employer]

## 2018-01-07 NOTE — ED NOTES
Patient was educated on discharge instructions.  Patient was informed about diagnosis, symptom management, risks, and home care instructions.  Patient verbalized understanding and signed discharge instructions. Prescriptions handed to patient. Copy of discharge instructions in chart.  Patient ambulated out with SO.  Patient has personal belongings and PIV removed, tip intact.

## 2018-03-28 ENCOUNTER — HOSPITAL ENCOUNTER (OUTPATIENT)
Dept: RADIOLOGY | Facility: MEDICAL CENTER | Age: 28
End: 2018-03-28
Attending: NURSE PRACTITIONER
Payer: MEDICAID

## 2018-03-28 DIAGNOSIS — R10.9 ABDOMINAL PAIN, UNSPECIFIED ABDOMINAL LOCATION: ICD-10-CM

## 2018-03-28 PROCEDURE — 76700 US EXAM ABDOM COMPLETE: CPT

## 2018-04-26 ENCOUNTER — HOSPITAL ENCOUNTER (OUTPATIENT)
Facility: MEDICAL CENTER | Age: 28
End: 2018-04-26
Attending: INTERNAL MEDICINE | Admitting: INTERNAL MEDICINE
Payer: MEDICAID

## 2018-05-07 ENCOUNTER — APPOINTMENT (OUTPATIENT)
Dept: RADIOLOGY | Facility: MEDICAL CENTER | Age: 28
End: 2018-05-07
Attending: EMERGENCY MEDICINE
Payer: MEDICAID

## 2018-05-07 ENCOUNTER — HOSPITAL ENCOUNTER (EMERGENCY)
Facility: MEDICAL CENTER | Age: 28
End: 2018-05-07
Attending: EMERGENCY MEDICINE
Payer: MEDICAID

## 2018-05-07 VITALS
SYSTOLIC BLOOD PRESSURE: 136 MMHG | HEIGHT: 70 IN | DIASTOLIC BLOOD PRESSURE: 71 MMHG | OXYGEN SATURATION: 96 % | BODY MASS INDEX: 37.08 KG/M2 | TEMPERATURE: 98.5 F | RESPIRATION RATE: 16 BRPM | HEART RATE: 64 BPM | WEIGHT: 259.04 LBS

## 2018-05-07 DIAGNOSIS — R10.9 ABDOMINAL PAIN, UNSPECIFIED ABDOMINAL LOCATION: ICD-10-CM

## 2018-05-07 LAB
ALBUMIN SERPL BCP-MCNC: 3.9 G/DL (ref 3.2–4.9)
ALBUMIN/GLOB SERPL: 1.3 G/DL
ALP SERPL-CCNC: 74 U/L (ref 30–99)
ALT SERPL-CCNC: 36 U/L (ref 2–50)
ANION GAP SERPL CALC-SCNC: 7 MMOL/L (ref 0–11.9)
APPEARANCE UR: CLEAR
AST SERPL-CCNC: 23 U/L (ref 12–45)
BASOPHILS # BLD AUTO: 1.3 % (ref 0–1.8)
BASOPHILS # BLD: 0.12 K/UL (ref 0–0.12)
BILIRUB SERPL-MCNC: 0.4 MG/DL (ref 0.1–1.5)
BILIRUB UR QL STRIP.AUTO: NEGATIVE
BUN SERPL-MCNC: 6 MG/DL (ref 8–22)
CALCIUM SERPL-MCNC: 9.1 MG/DL (ref 8.4–10.2)
CHLORIDE SERPL-SCNC: 104 MMOL/L (ref 96–112)
CO2 SERPL-SCNC: 25 MMOL/L (ref 20–33)
COLOR UR: YELLOW
CREAT SERPL-MCNC: 1 MG/DL (ref 0.5–1.4)
EOSINOPHIL # BLD AUTO: 0.45 K/UL (ref 0–0.51)
EOSINOPHIL NFR BLD: 4.7 % (ref 0–6.9)
EPI CELLS #/AREA URNS HPF: NORMAL /HPF
ERYTHROCYTE [DISTWIDTH] IN BLOOD BY AUTOMATED COUNT: 39.8 FL (ref 35.9–50)
GLOBULIN SER CALC-MCNC: 3.1 G/DL (ref 1.9–3.5)
GLUCOSE SERPL-MCNC: 93 MG/DL (ref 65–99)
GLUCOSE UR STRIP.AUTO-MCNC: NEGATIVE MG/DL
HCG SERPL QL: NEGATIVE
HCT VFR BLD AUTO: 41.1 % (ref 37–47)
HGB BLD-MCNC: 14.3 G/DL (ref 12–16)
IMM GRANULOCYTES # BLD AUTO: 0.02 K/UL (ref 0–0.11)
IMM GRANULOCYTES NFR BLD AUTO: 0.2 % (ref 0–0.9)
KETONES UR STRIP.AUTO-MCNC: NEGATIVE MG/DL
LEUKOCYTE ESTERASE UR QL STRIP.AUTO: NEGATIVE
LIPASE SERPL-CCNC: 25 U/L (ref 7–58)
LYMPHOCYTES # BLD AUTO: 3.04 K/UL (ref 1–4.8)
LYMPHOCYTES NFR BLD: 31.8 % (ref 22–41)
MCH RBC QN AUTO: 30.6 PG (ref 27–33)
MCHC RBC AUTO-ENTMCNC: 34.8 G/DL (ref 33.6–35)
MCV RBC AUTO: 87.8 FL (ref 81.4–97.8)
MICRO URNS: ABNORMAL
MONOCYTES # BLD AUTO: 0.85 K/UL (ref 0–0.85)
MONOCYTES NFR BLD AUTO: 8.9 % (ref 0–13.4)
NEUTROPHILS # BLD AUTO: 5.08 K/UL (ref 2–7.15)
NEUTROPHILS NFR BLD: 53.1 % (ref 44–72)
NITRITE UR QL STRIP.AUTO: NEGATIVE
NRBC # BLD AUTO: 0 K/UL
NRBC BLD-RTO: 0 /100 WBC
PH UR STRIP.AUTO: 7.5 [PH]
PLATELET # BLD AUTO: 345 K/UL (ref 164–446)
PMV BLD AUTO: 9.4 FL (ref 9–12.9)
POTASSIUM SERPL-SCNC: 3.8 MMOL/L (ref 3.6–5.5)
PROT SERPL-MCNC: 7 G/DL (ref 6–8.2)
PROT UR QL STRIP: NEGATIVE MG/DL
RBC # BLD AUTO: 4.68 M/UL (ref 4.2–5.4)
RBC # URNS HPF: NORMAL /HPF
RBC UR QL AUTO: ABNORMAL
SODIUM SERPL-SCNC: 136 MMOL/L (ref 135–145)
SP GR UR STRIP.AUTO: 1.01
WBC # BLD AUTO: 9.6 K/UL (ref 4.8–10.8)
WBC #/AREA URNS HPF: NORMAL /HPF

## 2018-05-07 PROCEDURE — 84703 CHORIONIC GONADOTROPIN ASSAY: CPT

## 2018-05-07 PROCEDURE — 96374 THER/PROPH/DIAG INJ IV PUSH: CPT

## 2018-05-07 PROCEDURE — 80053 COMPREHEN METABOLIC PANEL: CPT

## 2018-05-07 PROCEDURE — 99285 EMERGENCY DEPT VISIT HI MDM: CPT

## 2018-05-07 PROCEDURE — 74022 RADEX COMPL AQT ABD SERIES: CPT

## 2018-05-07 PROCEDURE — 85025 COMPLETE CBC W/AUTO DIFF WBC: CPT

## 2018-05-07 PROCEDURE — 83690 ASSAY OF LIPASE: CPT

## 2018-05-07 PROCEDURE — 36415 COLL VENOUS BLD VENIPUNCTURE: CPT

## 2018-05-07 PROCEDURE — 82272 OCCULT BLD FECES 1-3 TESTS: CPT

## 2018-05-07 PROCEDURE — 700111 HCHG RX REV CODE 636 W/ 250 OVERRIDE (IP): Performed by: EMERGENCY MEDICINE

## 2018-05-07 PROCEDURE — 81001 URINALYSIS AUTO W/SCOPE: CPT

## 2018-05-07 PROCEDURE — 700102 HCHG RX REV CODE 250 W/ 637 OVERRIDE(OP): Performed by: EMERGENCY MEDICINE

## 2018-05-07 PROCEDURE — A9270 NON-COVERED ITEM OR SERVICE: HCPCS | Performed by: EMERGENCY MEDICINE

## 2018-05-07 RX ORDER — ONDANSETRON 4 MG/1
4 TABLET, ORALLY DISINTEGRATING ORAL ONCE
Qty: 10 TAB | Refills: 0 | Status: SHIPPED | OUTPATIENT
Start: 2018-05-07 | End: 2018-05-07

## 2018-05-07 RX ORDER — PANTOPRAZOLE SODIUM 40 MG/1
40 TABLET, DELAYED RELEASE ORAL 2 TIMES DAILY
Status: SHIPPED | COMMUNITY
End: 2024-01-26

## 2018-05-07 RX ORDER — DIPHENHYDRAMINE HYDROCHLORIDE 50 MG/ML
25 INJECTION INTRAMUSCULAR; INTRAVENOUS ONCE
Status: DISCONTINUED | OUTPATIENT
Start: 2018-05-07 | End: 2018-05-07 | Stop reason: HOSPADM

## 2018-05-07 RX ORDER — SUCRALFATE 1 G/1
1 TABLET ORAL
Status: SHIPPED | COMMUNITY
End: 2024-01-26

## 2018-05-07 RX ORDER — TRAMADOL HYDROCHLORIDE 50 MG/1
50 TABLET ORAL 2 TIMES DAILY PRN
Status: SHIPPED | COMMUNITY
End: 2024-01-26

## 2018-05-07 RX ORDER — ONDANSETRON 8 MG/1
8 TABLET, ORALLY DISINTEGRATING ORAL EVERY 8 HOURS PRN
Status: SHIPPED | COMMUNITY
End: 2024-01-26

## 2018-05-07 RX ORDER — DICYCLOMINE HCL 20 MG
20 TABLET ORAL EVERY 6 HOURS
Qty: 120 TAB | Refills: 0 | Status: SHIPPED | OUTPATIENT
Start: 2018-05-07 | End: 2024-01-26

## 2018-05-07 RX ORDER — CYCLOBENZAPRINE HCL 5 MG
5 TABLET ORAL 3 TIMES DAILY PRN
Status: SHIPPED | COMMUNITY
End: 2024-01-26

## 2018-05-07 RX ORDER — VENLAFAXINE HYDROCHLORIDE 75 MG/1
75 CAPSULE, EXTENDED RELEASE ORAL DAILY
Status: SHIPPED | COMMUNITY
End: 2024-01-26

## 2018-05-07 RX ORDER — LEVETIRACETAM 500 MG/1
1500 TABLET ORAL 2 TIMES DAILY
Status: SHIPPED | COMMUNITY
End: 2024-01-26

## 2018-05-07 RX ORDER — TRAZODONE HYDROCHLORIDE 50 MG/1
25-50 TABLET ORAL
Status: SHIPPED | COMMUNITY
End: 2024-01-26

## 2018-05-07 RX ORDER — ERGOCALCIFEROL 1.25 MG/1
50000 CAPSULE ORAL
Status: SHIPPED | COMMUNITY
End: 2024-01-26

## 2018-05-07 RX ORDER — AZITHROMYCIN 500 MG/1
1000 INJECTION, POWDER, LYOPHILIZED, FOR SOLUTION INTRAVENOUS ONCE
Status: SHIPPED | COMMUNITY
End: 2018-05-22

## 2018-05-07 RX ORDER — ONDANSETRON 2 MG/ML
4 INJECTION INTRAMUSCULAR; INTRAVENOUS ONCE
Status: COMPLETED | OUTPATIENT
Start: 2018-05-07 | End: 2018-05-07

## 2018-05-07 RX ADMIN — LIDOCAINE HYDROCHLORIDE 30 ML: 20 SOLUTION OROPHARYNGEAL at 10:40

## 2018-05-07 RX ADMIN — ONDANSETRON 4 MG: 2 INJECTION INTRAMUSCULAR; INTRAVENOUS at 10:40

## 2018-05-07 ASSESSMENT — PAIN SCALES - GENERAL
PAINLEVEL_OUTOF10: 10
PAINLEVEL_OUTOF10: 10

## 2018-05-07 ASSESSMENT — LIFESTYLE VARIABLES: DO YOU DRINK ALCOHOL: NO

## 2018-05-07 NOTE — ED NOTES
"Chief Complaint   Patient presents with   • Bloody Stools     \"black charcoal diarrhea\" every BM; has not had BM for 3-4 days; only smears; past 3 months   • Abdominal Pain     10/10 abdominal from epigastric area down to belly button; past 3 months worse last 2 weeks   • N/V     constant for 3 months; worse last 2 weeks; unable to eat without vomiting past 2 weeks   /83   Pulse 78   Temp 36.9 °C (98.5 °F)   Resp 18   Ht 1.778 m (5' 10\")   Wt 117.5 kg (259 lb 0.7 oz)   SpO2 96%   BMI 37.17 kg/m²    "

## 2018-05-07 NOTE — ED PROVIDER NOTES
"ED Provider Note    CHIEF COMPLAINT  Chief Complaint   Patient presents with   • Bloody Stools     \"black charcoal diarrhea\" every BM; has not had BM for 3-4 days; only smears; past 3 months   • Abdominal Pain     10/10 abdominal from epigastric area down to belly button; past 3 months worse last 2 weeks   • N/V     constant for 3 months; worse last 2 weeks; unable to eat without vomiting past 2 weeks       HPI  Oseas Day is a 27 y.o. female here for evaluation of chronic abdominal pain for approximately 4 months, and rectal bleeding. The patient states that she has had these symptoms for the entire 4 months, and that eating will often exacerbate her abdominal cramping. Remaining still alleviates her pain, and she describes the quality of pain as aching and cramping. She has no radiation of her pain, and states that it stays in the abdomen itself. It is intermittent for most of the time, but does have periods where \"won't go away.\" She has seen a GI doctor, Dr. Hennessy, twice for the same, and is currently undergoing treatment.   The pt states that she wants to have her upper and lower endoscopy done today, and if not, she wants her gallbladder out today as well.  \"i'm just tired of having to wait for all of this.'    PAST MEDICAL HISTORY   has a past medical history of Seizure disorder (HCC).    SOCIAL HISTORY  Social History     Social History Main Topics   • Smoking status: Current Every Day Smoker     Packs/day: 0.50     Types: Cigarettes   • Smokeless tobacco: Never Used   • Alcohol use Yes      Comment: none in past 6 years    • Drug use: Yes     Types: Intravenous      Comment: none in past 6 years   • Sexual activity: Not on file       SURGICAL HISTORY  patient denies any surgical history    CURRENT MEDICATIONS  Home Medications    **Home medications have not yet been reviewed for this encounter**         ALLERGIES  Allergies   Allergen Reactions   • Other Drug      Pt states NO NARCOTICS if " possible; recovering addict       REVIEW OF SYSTEMS  See HPI for further details. Review of systems as above, otherwise all other systems are negative.     PHYSICAL EXAM  Constitutional: Well developed, well nourished. mild acute distress.  HEENT: Normocephalic, atraumatic. Posterior pharynx clear and moist.  Eyes:  EOMI. Normal sclera.  Neck: Supple, Full range of motion, nontender.  Chest/Pulmonary: clear to ausculation. Symmetrical expansion.   Cardio: Regular rate and rhythm with no murmur.   Abdomen: Soft, diffuse tenderness,No peritoneal signs. No guarding. No palpable masses.  Musculoskeletal: No deformity, no edema, neurovascular intact.   Neuro: Clear speech, appropriate, cooperative, cranial nerves II-XII grossly intact.  Rectal:  Good tone, guaiac negative.   Psych: anxious, agitated     Results for orders placed or performed during the hospital encounter of 05/07/18   CBC WITH DIFFERENTIAL   Result Value Ref Range    WBC 9.6 4.8 - 10.8 K/uL    RBC 4.68 4.20 - 5.40 M/uL    Hemoglobin 14.3 12.0 - 16.0 g/dL    Hematocrit 41.1 37.0 - 47.0 %    MCV 87.8 81.4 - 97.8 fL    MCH 30.6 27.0 - 33.0 pg    MCHC 34.8 33.6 - 35.0 g/dL    RDW 39.8 35.9 - 50.0 fL    Platelet Count 345 164 - 446 K/uL    MPV 9.4 9.0 - 12.9 fL    Neutrophils-Polys 53.10 44.00 - 72.00 %    Lymphocytes 31.80 22.00 - 41.00 %    Monocytes 8.90 0.00 - 13.40 %    Eosinophils 4.70 0.00 - 6.90 %    Basophils 1.30 0.00 - 1.80 %    Immature Granulocytes 0.20 0.00 - 0.90 %    Nucleated RBC 0.00 /100 WBC    Neutrophils (Absolute) 5.08 2.00 - 7.15 K/uL    Lymphs (Absolute) 3.04 1.00 - 4.80 K/uL    Monos (Absolute) 0.85 0.00 - 0.85 K/uL    Eos (Absolute) 0.45 0.00 - 0.51 K/uL    Baso (Absolute) 0.12 0.00 - 0.12 K/uL    Immature Granulocytes (abs) 0.02 0.00 - 0.11 K/uL    NRBC (Absolute) 0.00 K/uL   COMP METABOLIC PANEL   Result Value Ref Range    Sodium 136 135 - 145 mmol/L    Potassium 3.8 3.6 - 5.5 mmol/L    Chloride 104 96 - 112 mmol/L    Co2 25 20 -  33 mmol/L    Anion Gap 7.0 0.0 - 11.9    Glucose 93 65 - 99 mg/dL    Bun 6 (L) 8 - 22 mg/dL    Creatinine 1.00 0.50 - 1.40 mg/dL    Calcium 9.1 8.4 - 10.2 mg/dL    AST(SGOT) 23 12 - 45 U/L    ALT(SGPT) 36 2 - 50 U/L    Alkaline Phosphatase 74 30 - 99 U/L    Total Bilirubin 0.4 0.1 - 1.5 mg/dL    Albumin 3.9 3.2 - 4.9 g/dL    Total Protein 7.0 6.0 - 8.2 g/dL    Globulin 3.1 1.9 - 3.5 g/dL    A-G Ratio 1.3 g/dL   LIPASE   Result Value Ref Range    Lipase 25 7 - 58 U/L   HCG QUAL SERUM   Result Value Ref Range    Beta-Hcg Qualitative Serum Negative Negative   URINALYSIS CULTURE, IF INDICATED   Result Value Ref Range    Color Yellow     Character Clear     Specific Gravity 1.010 <1.035    Ph 7.5 5.0 - 8.0    Glucose Negative Negative mg/dL    Ketones Negative Negative mg/dL    Protein Negative Negative mg/dL    Bilirubin Negative Negative    Nitrite Negative Negative    Leukocyte Esterase Negative Negative    Occult Blood Trace (A) Negative    Micro Urine Req Microscopic    ESTIMATED GFR   Result Value Ref Range    GFR If African American >60 >60 mL/min/1.73 m 2    GFR If Non African American >60 >60 mL/min/1.73 m 2   URINE MICROSCOPIC (W/UA)   Result Value Ref Range    WBC 0-2 /hpf    RBC 0-2 /hpf    Epithelial Cells Few Few /hpf     DX-ABDOMEN COMPLETE WITH AP OR PA CXR   Final Result         1. No acute abnormality detected.            PROCEDURES     MEDICAL RECORD  I have reviewed patient's medical record and pertinent results are listed above.    COURSE & MEDICAL DECISION MAKING  I have reviewed any medical record information, laboratory studies and radiographic results as noted above.    If you have had any blood pressure issues while here in the emergency department, please see your doctor for a further evaluation or work up.    12:46 PM  The patient is comfortable, nontoxic-appearing, and afebrile. She has a negative workup thus far, and agrees to follow-up with GI this week. She is guaiac negative on rectal  exam, has not had any vomiting while here, and is well-hydrated.  The pt has a lengthy history of abdominal pain, and is currently being worked up for the same.  She is aware that she needs to still follow up.      Differential diagnoses include but not limited to:  Gastroenteritis, colitis, hemorrhoids, chronic abdominal pain, tubo-ovarian abscess, pregnancy,    This patient presents with abdominal pain .  At this time, I have counseled the patient/family regarding their medications, pain control, and follow up.  They will continue their medications, if any, as prescribed.  They will return immediately for any worsening symptoms and/or any other medical concerns.  They will see their doctor, or contact the doctor provided, in 1-2 days for follow up.       FINAL IMPRESSION  1. Abdominal pain, unspecified abdominal location            Electronically signed by: Juarez Eubanks, 5/7/2018 10:13 AM

## 2018-05-07 NOTE — ED NOTES
Med Rec complete per PT at bedside   Allergies Reviewed    Pt had a once dose of AZITHROMYCIN 1000 mg on 4/7

## 2018-05-22 VITALS — HEIGHT: 70 IN | BODY MASS INDEX: 36.99 KG/M2 | WEIGHT: 258.38 LBS

## 2018-05-22 DIAGNOSIS — Z01.812 PRE-OPERATIVE LABORATORY EXAMINATION: ICD-10-CM

## 2018-05-22 LAB — HCG SERPL QL: NEGATIVE

## 2018-05-22 PROCEDURE — 36415 COLL VENOUS BLD VENIPUNCTURE: CPT

## 2018-05-22 PROCEDURE — 84703 CHORIONIC GONADOTROPIN ASSAY: CPT

## 2018-05-22 NOTE — OR NURSING
"Preadmit appointment: \" Preparing for your Procedure information\" sheet given to patient with verbal and written instructions. Patient instructed to continue prescribed medications through the day before surgery, instructed to take the following medications the day of surgery per anesthesia protocol: Albuterol inhaler if needed (pt instructed to bring day of procedure), Keppra, Protonix, Tramadol if needed.  Pt states she had a grand mal seizure yesterday due to difficulty holding down her Keppra and her other medications.  Pt states she has had intermittent nausea/vomiting/ diarrhea past 6 months.  Pt states Dr. Cotter is aware  She is having difficulty holding down her medications. Dr. Richey notified of admit, health summary including seizure  "

## 2018-05-23 ENCOUNTER — HOSPITAL ENCOUNTER (INPATIENT)
Facility: MEDICAL CENTER | Age: 28
LOS: 2 days | DRG: 444 | End: 2018-05-25
Attending: EMERGENCY MEDICINE | Admitting: HOSPITALIST
Payer: MEDICAID

## 2018-05-23 DIAGNOSIS — R11.2 NAUSEA AND VOMITING, INTRACTABILITY OF VOMITING NOT SPECIFIED, UNSPECIFIED VOMITING TYPE: ICD-10-CM

## 2018-05-23 DIAGNOSIS — R56.9 SEIZURES (HCC): ICD-10-CM

## 2018-05-23 PROBLEM — G40.409 GRAND MAL SEIZURE (HCC): Status: ACTIVE | Noted: 2018-05-23

## 2018-05-23 PROBLEM — E66.9 OBESITY (BMI 30-39.9): Status: ACTIVE | Noted: 2018-05-23

## 2018-05-23 PROBLEM — E87.6 HYPOKALEMIA: Status: ACTIVE | Noted: 2018-05-23

## 2018-05-23 PROBLEM — Z72.0 TOBACCO ABUSE: Status: ACTIVE | Noted: 2018-05-23

## 2018-05-23 LAB
ALBUMIN SERPL BCP-MCNC: 4.2 G/DL (ref 3.2–4.9)
ALBUMIN/GLOB SERPL: 1.4 G/DL
ALP SERPL-CCNC: 67 U/L (ref 30–99)
ALT SERPL-CCNC: 30 U/L (ref 2–50)
ANION GAP SERPL CALC-SCNC: 9 MMOL/L (ref 0–11.9)
APPEARANCE UR: CLEAR
AST SERPL-CCNC: 22 U/L (ref 12–45)
BASOPHILS # BLD AUTO: 0.9 % (ref 0–1.8)
BASOPHILS # BLD: 0.1 K/UL (ref 0–0.12)
BILIRUB SERPL-MCNC: 0.4 MG/DL (ref 0.1–1.5)
BUN SERPL-MCNC: 8 MG/DL (ref 8–22)
CALCIUM SERPL-MCNC: 8.8 MG/DL (ref 8.4–10.2)
CHLORIDE SERPL-SCNC: 104 MMOL/L (ref 96–112)
CO2 SERPL-SCNC: 24 MMOL/L (ref 20–33)
COLOR UR: YELLOW
CREAT SERPL-MCNC: 0.87 MG/DL (ref 0.5–1.4)
EOSINOPHIL # BLD AUTO: 0.38 K/UL (ref 0–0.51)
EOSINOPHIL NFR BLD: 3.5 % (ref 0–6.9)
ERYTHROCYTE [DISTWIDTH] IN BLOOD BY AUTOMATED COUNT: 40.8 FL (ref 35.9–50)
GLOBULIN SER CALC-MCNC: 3 G/DL (ref 1.9–3.5)
GLUCOSE SERPL-MCNC: 101 MG/DL (ref 65–99)
GLUCOSE UR STRIP.AUTO-MCNC: NEGATIVE MG/DL
HCG UR QL: NEGATIVE
HCT VFR BLD AUTO: 43.5 % (ref 37–47)
HGB BLD-MCNC: 14.7 G/DL (ref 12–16)
IMM GRANULOCYTES # BLD AUTO: 0.03 K/UL (ref 0–0.11)
IMM GRANULOCYTES NFR BLD AUTO: 0.3 % (ref 0–0.9)
KETONES UR STRIP.AUTO-MCNC: NEGATIVE MG/DL
LEUKOCYTE ESTERASE UR QL STRIP.AUTO: ABNORMAL
LIPASE SERPL-CCNC: 20 U/L (ref 7–58)
LIPASE SERPL-CCNC: 23 U/L (ref 7–58)
LYMPHOCYTES # BLD AUTO: 3.31 K/UL (ref 1–4.8)
LYMPHOCYTES NFR BLD: 30.8 % (ref 22–41)
MCH RBC QN AUTO: 30.4 PG (ref 27–33)
MCHC RBC AUTO-ENTMCNC: 33.8 G/DL (ref 33.6–35)
MCV RBC AUTO: 89.9 FL (ref 81.4–97.8)
MONOCYTES # BLD AUTO: 0.69 K/UL (ref 0–0.85)
MONOCYTES NFR BLD AUTO: 6.4 % (ref 0–13.4)
NEUTROPHILS # BLD AUTO: 6.24 K/UL (ref 2–7.15)
NEUTROPHILS NFR BLD: 58.1 % (ref 44–72)
NITRITE UR QL STRIP.AUTO: NEGATIVE
NRBC # BLD AUTO: 0 K/UL
NRBC BLD-RTO: 0 /100 WBC
PH UR STRIP.AUTO: 6 [PH]
PLATELET # BLD AUTO: 392 K/UL (ref 164–446)
PMV BLD AUTO: 9.7 FL (ref 9–12.9)
POTASSIUM SERPL-SCNC: 3.5 MMOL/L (ref 3.6–5.5)
PROT SERPL-MCNC: 7.2 G/DL (ref 6–8.2)
PROT UR QL STRIP: NEGATIVE MG/DL
RBC # BLD AUTO: 4.84 M/UL (ref 4.2–5.4)
RBC UR QL AUTO: NEGATIVE
SODIUM SERPL-SCNC: 137 MMOL/L (ref 135–145)
SP GR UR STRIP.AUTO: <=1.005
WBC # BLD AUTO: 10.8 K/UL (ref 4.8–10.8)

## 2018-05-23 PROCEDURE — 700102 HCHG RX REV CODE 250 W/ 637 OVERRIDE(OP): Performed by: HOSPITALIST

## 2018-05-23 PROCEDURE — 36415 COLL VENOUS BLD VENIPUNCTURE: CPT

## 2018-05-23 PROCEDURE — 700105 HCHG RX REV CODE 258: Performed by: EMERGENCY MEDICINE

## 2018-05-23 PROCEDURE — 81025 URINE PREGNANCY TEST: CPT

## 2018-05-23 PROCEDURE — 94760 N-INVAS EAR/PLS OXIMETRY 1: CPT

## 2018-05-23 PROCEDURE — 80177 DRUG SCRN QUAN LEVETIRACETAM: CPT

## 2018-05-23 PROCEDURE — 700111 HCHG RX REV CODE 636 W/ 250 OVERRIDE (IP): Performed by: EMERGENCY MEDICINE

## 2018-05-23 PROCEDURE — 83690 ASSAY OF LIPASE: CPT

## 2018-05-23 PROCEDURE — 700102 HCHG RX REV CODE 250 W/ 637 OVERRIDE(OP): Performed by: EMERGENCY MEDICINE

## 2018-05-23 PROCEDURE — 700111 HCHG RX REV CODE 636 W/ 250 OVERRIDE (IP): Performed by: HOSPITALIST

## 2018-05-23 PROCEDURE — 96374 THER/PROPH/DIAG INJ IV PUSH: CPT

## 2018-05-23 PROCEDURE — 99406 BEHAV CHNG SMOKING 3-10 MIN: CPT | Performed by: HOSPITALIST

## 2018-05-23 PROCEDURE — 99223 1ST HOSP IP/OBS HIGH 75: CPT | Mod: 25 | Performed by: HOSPITALIST

## 2018-05-23 PROCEDURE — A9270 NON-COVERED ITEM OR SERVICE: HCPCS | Performed by: EMERGENCY MEDICINE

## 2018-05-23 PROCEDURE — 770006 HCHG ROOM/CARE - MED/SURG/GYN SEMI*

## 2018-05-23 PROCEDURE — 99285 EMERGENCY DEPT VISIT HI MDM: CPT

## 2018-05-23 PROCEDURE — 81002 URINALYSIS NONAUTO W/O SCOPE: CPT

## 2018-05-23 PROCEDURE — 85025 COMPLETE CBC W/AUTO DIFF WBC: CPT

## 2018-05-23 PROCEDURE — 700101 HCHG RX REV CODE 250: Performed by: HOSPITALIST

## 2018-05-23 PROCEDURE — 80053 COMPREHEN METABOLIC PANEL: CPT

## 2018-05-23 PROCEDURE — A9270 NON-COVERED ITEM OR SERVICE: HCPCS | Performed by: HOSPITALIST

## 2018-05-23 RX ORDER — HYDROMORPHONE HYDROCHLORIDE 2 MG/ML
0.25 INJECTION, SOLUTION INTRAMUSCULAR; INTRAVENOUS; SUBCUTANEOUS
Status: DISCONTINUED | OUTPATIENT
Start: 2018-05-23 | End: 2018-05-25 | Stop reason: HOSPADM

## 2018-05-23 RX ORDER — SUCRALFATE 1 G/1
1 TABLET ORAL
Status: DISCONTINUED | OUTPATIENT
Start: 2018-05-23 | End: 2018-05-25 | Stop reason: HOSPADM

## 2018-05-23 RX ORDER — VENLAFAXINE HYDROCHLORIDE 75 MG/1
75 CAPSULE, EXTENDED RELEASE ORAL DAILY
Status: DISCONTINUED | OUTPATIENT
Start: 2018-05-24 | End: 2018-05-25 | Stop reason: HOSPADM

## 2018-05-23 RX ORDER — OXYCODONE HYDROCHLORIDE 5 MG/1
2.5 TABLET ORAL
Status: DISCONTINUED | OUTPATIENT
Start: 2018-05-23 | End: 2018-05-25 | Stop reason: HOSPADM

## 2018-05-23 RX ORDER — ALBUTEROL SULFATE 90 UG/1
2 AEROSOL, METERED RESPIRATORY (INHALATION) EVERY 6 HOURS PRN
Status: DISCONTINUED | OUTPATIENT
Start: 2018-05-23 | End: 2018-05-25 | Stop reason: HOSPADM

## 2018-05-23 RX ORDER — POLYETHYLENE GLYCOL 3350 17 G/17G
1 POWDER, FOR SOLUTION ORAL
Status: DISCONTINUED | OUTPATIENT
Start: 2018-05-23 | End: 2018-05-25 | Stop reason: HOSPADM

## 2018-05-23 RX ORDER — LEVETIRACETAM 500 MG/1
1500 TABLET ORAL 2 TIMES DAILY
Status: DISCONTINUED | OUTPATIENT
Start: 2018-05-23 | End: 2018-05-25 | Stop reason: HOSPADM

## 2018-05-23 RX ORDER — AMOXICILLIN 250 MG
2 CAPSULE ORAL 2 TIMES DAILY
Status: DISCONTINUED | OUTPATIENT
Start: 2018-05-23 | End: 2018-05-25 | Stop reason: HOSPADM

## 2018-05-23 RX ORDER — ONDANSETRON 2 MG/ML
4 INJECTION INTRAMUSCULAR; INTRAVENOUS ONCE
Status: COMPLETED | OUTPATIENT
Start: 2018-05-23 | End: 2018-05-23

## 2018-05-23 RX ORDER — ACETAMINOPHEN 325 MG/1
650 TABLET ORAL EVERY 6 HOURS PRN
Status: DISCONTINUED | OUTPATIENT
Start: 2018-05-23 | End: 2018-05-25 | Stop reason: HOSPADM

## 2018-05-23 RX ORDER — SODIUM CHLORIDE 9 MG/ML
INJECTION, SOLUTION INTRAVENOUS CONTINUOUS
Status: DISCONTINUED | OUTPATIENT
Start: 2018-05-23 | End: 2018-05-24

## 2018-05-23 RX ORDER — NICOTINE 21 MG/24HR
14 PATCH, TRANSDERMAL 24 HOURS TRANSDERMAL
Status: DISCONTINUED | OUTPATIENT
Start: 2018-05-23 | End: 2018-05-25 | Stop reason: HOSPADM

## 2018-05-23 RX ORDER — ALBUTEROL SULFATE 90 UG/1
2 AEROSOL, METERED RESPIRATORY (INHALATION) EVERY 6 HOURS PRN
COMMUNITY

## 2018-05-23 RX ORDER — BISACODYL 10 MG
10 SUPPOSITORY, RECTAL RECTAL
Status: DISCONTINUED | OUTPATIENT
Start: 2018-05-23 | End: 2018-05-25 | Stop reason: HOSPADM

## 2018-05-23 RX ORDER — OXYCODONE HYDROCHLORIDE 5 MG/1
5 TABLET ORAL
Status: DISCONTINUED | OUTPATIENT
Start: 2018-05-23 | End: 2018-05-25 | Stop reason: HOSPADM

## 2018-05-23 RX ORDER — LEVETIRACETAM 500 MG/1
500 TABLET ORAL ONCE
Status: COMPLETED | OUTPATIENT
Start: 2018-05-23 | End: 2018-05-23

## 2018-05-23 RX ORDER — OMEPRAZOLE 20 MG/1
20 CAPSULE, DELAYED RELEASE ORAL 2 TIMES DAILY
Status: DISCONTINUED | OUTPATIENT
Start: 2018-05-23 | End: 2018-05-25 | Stop reason: HOSPADM

## 2018-05-23 RX ORDER — TRAZODONE HYDROCHLORIDE 50 MG/1
25-50 TABLET ORAL
Status: DISCONTINUED | OUTPATIENT
Start: 2018-05-23 | End: 2018-05-25 | Stop reason: HOSPADM

## 2018-05-23 RX ORDER — PANTOPRAZOLE SODIUM 40 MG/1
40 TABLET, DELAYED RELEASE ORAL 2 TIMES DAILY
Status: DISCONTINUED | OUTPATIENT
Start: 2018-05-23 | End: 2018-05-23

## 2018-05-23 RX ORDER — OXYCODONE HYDROCHLORIDE 5 MG/1
TABLET ORAL
Status: DISPENSED
Start: 2018-05-23 | End: 2018-05-24

## 2018-05-23 RX ORDER — SODIUM CHLORIDE AND POTASSIUM CHLORIDE 150; 900 MG/100ML; MG/100ML
INJECTION, SOLUTION INTRAVENOUS CONTINUOUS
Status: DISCONTINUED | OUTPATIENT
Start: 2018-05-23 | End: 2018-05-25 | Stop reason: HOSPADM

## 2018-05-23 RX ORDER — ONDANSETRON 2 MG/ML
4 INJECTION INTRAMUSCULAR; INTRAVENOUS EVERY 4 HOURS PRN
Status: DISCONTINUED | OUTPATIENT
Start: 2018-05-23 | End: 2018-05-25 | Stop reason: HOSPADM

## 2018-05-23 RX ORDER — PROMETHAZINE HYDROCHLORIDE 25 MG/1
12.5-25 SUPPOSITORY RECTAL EVERY 4 HOURS PRN
Status: DISCONTINUED | OUTPATIENT
Start: 2018-05-23 | End: 2018-05-25 | Stop reason: HOSPADM

## 2018-05-23 RX ORDER — ONDANSETRON 4 MG/1
4 TABLET, ORALLY DISINTEGRATING ORAL EVERY 4 HOURS PRN
Status: DISCONTINUED | OUTPATIENT
Start: 2018-05-23 | End: 2018-05-25 | Stop reason: HOSPADM

## 2018-05-23 RX ORDER — DICYCLOMINE HCL 20 MG
20 TABLET ORAL EVERY 6 HOURS
Status: DISCONTINUED | OUTPATIENT
Start: 2018-05-23 | End: 2018-05-25 | Stop reason: HOSPADM

## 2018-05-23 RX ORDER — PROMETHAZINE HYDROCHLORIDE 25 MG/1
12.5-25 TABLET ORAL EVERY 4 HOURS PRN
Status: DISCONTINUED | OUTPATIENT
Start: 2018-05-23 | End: 2018-05-25 | Stop reason: HOSPADM

## 2018-05-23 RX ADMIN — SODIUM CHLORIDE: 9 INJECTION, SOLUTION INTRAVENOUS at 15:17

## 2018-05-23 RX ADMIN — POTASSIUM CHLORIDE AND SODIUM CHLORIDE: 900; 150 INJECTION, SOLUTION INTRAVENOUS at 20:42

## 2018-05-23 RX ADMIN — OMEPRAZOLE 20 MG: 20 CAPSULE, DELAYED RELEASE ORAL at 21:58

## 2018-05-23 RX ADMIN — LEVETIRACETAM 500 MG: 500 TABLET ORAL at 16:16

## 2018-05-23 RX ADMIN — ONDANSETRON 4 MG: 2 INJECTION INTRAMUSCULAR; INTRAVENOUS at 20:44

## 2018-05-23 RX ADMIN — LEVETIRACETAM 1500 MG: 500 TABLET ORAL at 21:57

## 2018-05-23 RX ADMIN — OXYCODONE HYDROCHLORIDE 5 MG: 5 TABLET ORAL at 17:32

## 2018-05-23 RX ADMIN — SUCRALFATE 1 G: 1 TABLET ORAL at 21:57

## 2018-05-23 RX ADMIN — NICOTINE 14 MG: 14 PATCH, EXTENDED RELEASE TRANSDERMAL at 20:44

## 2018-05-23 RX ADMIN — ONDANSETRON 4 MG: 2 INJECTION INTRAMUSCULAR; INTRAVENOUS at 15:17

## 2018-05-23 RX ADMIN — TRAZODONE HYDROCHLORIDE 25 MG: 50 TABLET ORAL at 21:57

## 2018-05-23 ASSESSMENT — COPD QUESTIONNAIRES
IN THE PAST 12 MONTHS DO YOU DO LESS THAN YOU USED TO BECAUSE OF YOUR BREATHING PROBLEMS: DISAGREE/UNSURE
DURING THE PAST 4 WEEKS HOW MUCH DID YOU FEEL SHORT OF BREATH: NONE/LITTLE OF THE TIME
HAVE YOU SMOKED AT LEAST 100 CIGARETTES IN YOUR ENTIRE LIFE: YES
DO YOU EVER COUGH UP ANY MUCUS OR PHLEGM?: YES, EVERY DAY
HAVE YOU SMOKED AT LEAST 100 CIGARETTES IN YOUR ENTIRE LIFE: YES
DURING THE PAST 4 WEEKS HOW MUCH DID YOU FEEL SHORT OF BREATH: NONE/LITTLE OF THE TIME
COPD SCREENING SCORE: 2
DO YOU EVER COUGH UP ANY MUCUS OR PHLEGM?: NO/ONLY WITH OCCASIONAL COLDS OR INFECTIONS

## 2018-05-23 ASSESSMENT — PAIN SCALES - GENERAL
PAINLEVEL_OUTOF10: 9
PAINLEVEL_OUTOF10: 6
PAINLEVEL_OUTOF10: 7
PAINLEVEL_OUTOF10: 0

## 2018-05-23 ASSESSMENT — ENCOUNTER SYMPTOMS
BLURRED VISION: 0
NAUSEA: 0
LOSS OF CONSCIOUSNESS: 1
DIAPHORESIS: 1
SPEECH CHANGE: 0
CHILLS: 1
SEIZURES: 1
HEADACHES: 0
SHORTNESS OF BREATH: 0
WEAKNESS: 1
PALPITATIONS: 0
SENSORY CHANGE: 0
ABDOMINAL PAIN: 0
FEVER: 1
VOMITING: 0
FALLS: 1

## 2018-05-23 ASSESSMENT — PATIENT HEALTH QUESTIONNAIRE - PHQ9
SUM OF ALL RESPONSES TO PHQ9 QUESTIONS 1 AND 2: 0
2. FEELING DOWN, DEPRESSED, IRRITABLE, OR HOPELESS: NOT AT ALL
1. LITTLE INTEREST OR PLEASURE IN DOING THINGS: NOT AT ALL

## 2018-05-23 ASSESSMENT — LIFESTYLE VARIABLES
SUBSTANCE_ABUSE: 0
ALCOHOL_USE: NO
EVER_SMOKED: YES
EVER_SMOKED: YES

## 2018-05-23 NOTE — ED NOTES
Chief Complaint   Patient presents with   • Vomiting     6 months of vomiting, now she cannot hold down her seizure meds and has had 7 seizures prior to arrival   Having an upper and lower GI tomorrow to find out the cause of the vomiting.

## 2018-05-23 NOTE — OR NURSING
Lina from Dr. Cotter's office called and she has ordered a stat Keppra level and pt to have drawn but [facility] lab is unable to draw stat and lab is a send out and may take a day to receive the results.  I checked with [facility] and it is also a send out lab.  Lina said she has left a message for Dr. Cotter regarding this and will follow up with him

## 2018-05-23 NOTE — OR NURSING
Call placed to Dr. Cotter  Nurse and was given [place], left her a message with the following request from Dr Richey and informed her that pt did state she had a grand mal seizure on 5/21/2018 and for Dr. Cotter to be aware of this and any further orders needed.  Pt is currently taking Keppra        Good afternoon Bouchra,    The MAR shows that she no longer takes Keppra.  Is that correct?  If so, does she need her keppra levels rechecked by Dr Cotter or her PCP prior to procedure.  We certainly do not want her to seize during or after her procedure.  Please ensure that Dr Cotter is aware of her most recent seizure activity and inform us of any periop orders he may have.  Otherwise nothing to further to add.  Thank you.     Maikel Richey M.D.  [facility]

## 2018-05-24 LAB
ANION GAP SERPL CALC-SCNC: 4 MMOL/L (ref 0–11.9)
BUN SERPL-MCNC: 5 MG/DL (ref 8–22)
CALCIUM SERPL-MCNC: 8.3 MG/DL (ref 8.4–10.2)
CHLORIDE SERPL-SCNC: 109 MMOL/L (ref 96–112)
CO2 SERPL-SCNC: 27 MMOL/L (ref 20–33)
CREAT SERPL-MCNC: 0.93 MG/DL (ref 0.5–1.4)
ERYTHROCYTE [DISTWIDTH] IN BLOOD BY AUTOMATED COUNT: 41.7 FL (ref 35.9–50)
GLUCOSE SERPL-MCNC: 96 MG/DL (ref 65–99)
HCT VFR BLD AUTO: 39.5 % (ref 37–47)
HGB BLD-MCNC: 13.3 G/DL (ref 12–16)
LEVETIRACETAM SERPL-MCNC: 23 UG/ML (ref 12–46)
LEVETIRACETAM SERPL-MCNC: 25 UG/ML (ref 12–46)
MAGNESIUM SERPL-MCNC: 2 MG/DL (ref 1.5–2.5)
MCH RBC QN AUTO: 30.3 PG (ref 27–33)
MCHC RBC AUTO-ENTMCNC: 33.7 G/DL (ref 33.6–35)
MCV RBC AUTO: 90 FL (ref 81.4–97.8)
PLATELET # BLD AUTO: 330 K/UL (ref 164–446)
PMV BLD AUTO: 10 FL (ref 9–12.9)
POTASSIUM SERPL-SCNC: 3.7 MMOL/L (ref 3.6–5.5)
RBC # BLD AUTO: 4.39 M/UL (ref 4.2–5.4)
SODIUM SERPL-SCNC: 140 MMOL/L (ref 135–145)
WBC # BLD AUTO: 9.7 K/UL (ref 4.8–10.8)

## 2018-05-24 PROCEDURE — 99232 SBSQ HOSP IP/OBS MODERATE 35: CPT | Performed by: HOSPITALIST

## 2018-05-24 PROCEDURE — 83735 ASSAY OF MAGNESIUM: CPT

## 2018-05-24 PROCEDURE — A9270 NON-COVERED ITEM OR SERVICE: HCPCS | Performed by: HOSPITALIST

## 2018-05-24 PROCEDURE — 700111 HCHG RX REV CODE 636 W/ 250 OVERRIDE (IP): Performed by: HOSPITALIST

## 2018-05-24 PROCEDURE — 99406 BEHAV CHNG SMOKING 3-10 MIN: CPT

## 2018-05-24 PROCEDURE — 700102 HCHG RX REV CODE 250 W/ 637 OVERRIDE(OP): Performed by: HOSPITALIST

## 2018-05-24 PROCEDURE — 700101 HCHG RX REV CODE 250: Performed by: HOSPITALIST

## 2018-05-24 PROCEDURE — 700102 HCHG RX REV CODE 250 W/ 637 OVERRIDE(OP): Performed by: INTERNAL MEDICINE

## 2018-05-24 PROCEDURE — A9270 NON-COVERED ITEM OR SERVICE: HCPCS | Performed by: INTERNAL MEDICINE

## 2018-05-24 PROCEDURE — 85027 COMPLETE CBC AUTOMATED: CPT

## 2018-05-24 PROCEDURE — 36415 COLL VENOUS BLD VENIPUNCTURE: CPT

## 2018-05-24 PROCEDURE — 80048 BASIC METABOLIC PNL TOTAL CA: CPT

## 2018-05-24 PROCEDURE — 770006 HCHG ROOM/CARE - MED/SURG/GYN SEMI*

## 2018-05-24 RX ORDER — PEG-3350, SODIUM SULFATE, SODIUM CHLORIDE, POTASSIUM CHLORIDE, SODIUM ASCORBATE AND ASCORBIC ACID 7.5-2.691G
100 KIT ORAL 2 TIMES DAILY
Status: COMPLETED | OUTPATIENT
Start: 2018-05-24 | End: 2018-05-24

## 2018-05-24 RX ADMIN — VENLAFAXINE 75 MG: 75 CAPSULE, EXTENDED RELEASE ORAL at 07:50

## 2018-05-24 RX ADMIN — PROMETHAZINE HYDROCHLORIDE 25 MG: 25 TABLET ORAL at 23:10

## 2018-05-24 RX ADMIN — DICYCLOMINE HYDROCHLORIDE 20 MG: 20 TABLET ORAL at 16:37

## 2018-05-24 RX ADMIN — ENOXAPARIN SODIUM 40 MG: 100 INJECTION SUBCUTANEOUS at 07:50

## 2018-05-24 RX ADMIN — DICYCLOMINE HYDROCHLORIDE 20 MG: 20 TABLET ORAL at 23:10

## 2018-05-24 RX ADMIN — DICYCLOMINE HYDROCHLORIDE 20 MG: 20 TABLET ORAL at 00:51

## 2018-05-24 RX ADMIN — LEVETIRACETAM 1500 MG: 500 TABLET ORAL at 07:50

## 2018-05-24 RX ADMIN — SUCRALFATE 1 G: 1 TABLET ORAL at 16:37

## 2018-05-24 RX ADMIN — POTASSIUM CHLORIDE AND SODIUM CHLORIDE: 900; 150 INJECTION, SOLUTION INTRAVENOUS at 22:26

## 2018-05-24 RX ADMIN — DICYCLOMINE HYDROCHLORIDE 20 MG: 20 TABLET ORAL at 06:09

## 2018-05-24 RX ADMIN — SUCRALFATE 1 G: 1 TABLET ORAL at 20:49

## 2018-05-24 RX ADMIN — DICYCLOMINE HYDROCHLORIDE 20 MG: 20 TABLET ORAL at 10:42

## 2018-05-24 RX ADMIN — ONDANSETRON 4 MG: 2 INJECTION INTRAMUSCULAR; INTRAVENOUS at 14:22

## 2018-05-24 RX ADMIN — POLYETHYLENE GLYCOL 3350, SODIUM SULFATE, SODIUM CHLORIDE, POTASSIUM CHLORIDE, ASCORBIC ACID, SODIUM ASCORBATE 100 G: KIT at 11:33

## 2018-05-24 RX ADMIN — OXYCODONE HYDROCHLORIDE 5 MG: 5 TABLET ORAL at 14:22

## 2018-05-24 RX ADMIN — OXYCODONE HYDROCHLORIDE 5 MG: 5 TABLET ORAL at 20:48

## 2018-05-24 RX ADMIN — POLYETHYLENE GLYCOL 3350, SODIUM SULFATE, SODIUM CHLORIDE, POTASSIUM CHLORIDE, ASCORBIC ACID, SODIUM ASCORBATE 100 G: KIT at 23:19

## 2018-05-24 RX ADMIN — OMEPRAZOLE 20 MG: 20 CAPSULE, DELAYED RELEASE ORAL at 20:48

## 2018-05-24 RX ADMIN — SUCRALFATE 1 G: 1 TABLET ORAL at 06:09

## 2018-05-24 RX ADMIN — LEVETIRACETAM 1500 MG: 500 TABLET ORAL at 20:48

## 2018-05-24 RX ADMIN — POTASSIUM CHLORIDE AND SODIUM CHLORIDE: 900; 150 INJECTION, SOLUTION INTRAVENOUS at 07:56

## 2018-05-24 RX ADMIN — ONDANSETRON 4 MG: 2 INJECTION INTRAMUSCULAR; INTRAVENOUS at 20:44

## 2018-05-24 RX ADMIN — NICOTINE 14 MG: 14 PATCH, EXTENDED RELEASE TRANSDERMAL at 06:11

## 2018-05-24 RX ADMIN — OMEPRAZOLE 20 MG: 20 CAPSULE, DELAYED RELEASE ORAL at 07:51

## 2018-05-24 RX ADMIN — SUCRALFATE 1 G: 1 TABLET ORAL at 10:42

## 2018-05-24 ASSESSMENT — PAIN SCALES - GENERAL
PAINLEVEL_OUTOF10: 6
PAINLEVEL_OUTOF10: 0
PAINLEVEL_OUTOF10: 4
PAINLEVEL_OUTOF10: 7
PAINLEVEL_OUTOF10: 0

## 2018-05-24 NOTE — CONSULTS
"DATE OF SERVICE:  05/24/2018    GASTROENTEROLOGY CONSULTATION    CONSULTATION REQUESTED BY:  Dr. Robles.    REASON FOR CONSULTATION:  Nausea, vomiting, abdominal pain, and diarrhea.    IDENTIFICATION:  A 27-year-old  female.    CHIEF COMPLAINT:  Nausea, vomiting, abdominal pain and diarrhea.    HISTORY OF PRESENT ILLNESS:  History was obtained via interview of the   patient, discussion with Dr. Robles, review of GI consultant notes and   review of [facility] records.  The patient was actually seen by me in my clinic in   April, she was referred for about 4 months of nausea and vomiting, abdominal   pain, and alternating constipation and diarrhea.  She reports that she had   \"the flu\" about 6 months ago.  Ever since that time, she has had ongoing   problems with frequent nausea and vomiting.  Her pain is epigastric to right   upper quadrant, but may become more generalized at times, eating seems to   increase the pain and definitely leads to vomiting.  It does not seem to   matter what she eats in regard to fatty content or dairy for instance.  She   underwent an ultrasound on 03/18 showing an enlarged liver and a small   gallbladder polyp.  She has had prior peptic ulcers per her report and tells   me that this pain is different.  She is suspicious that it is her gallbladder.    She was actually scheduled by me to have outpatient EGD and colonoscopy for   evaluation of her symptoms yesterday; however, she was unable to keep her   Keppra down, which she takes for a seizure disorder and therefore the   procedures were canceled and I instructed her to go to the emergency   department as she would need IV hydration and assistance with management of   her seizure disorder.  She was admitted here.  She does not currently have a   neurologist here in [place].  The patient was also seen in the emergency   department here on 05/08 and had a normal abdominal x-ray at that time.    Labs here show some trace leukocyte " esterase and urine, but no bacteria,   otherwise her CBC, CMP, magnesium, and lipase are normal.  Her Keppra level   was 25 after intravenous administration.    In regard to her seizure disorder, her last seizure was about 2 weeks ago.    She has had seizure disorder since she was about 10 years old.    ALLERGIES:  SHELLFISH AND VINEGAR.    MEDICATIONS:  Home medications include:  1.  Cyclobenzaprine.  2.  Effexor.  3.  Keppra.  4.  Lomotil.  5.  Zofran.    CURRENT HOSPITAL MEDICATIONS:  Include:  1.  Bentyl.  2.  Lovenox.  3.  Keppra.  4.  Nicoderm patch.  5.  Omeprazole.  6.  Senna.  7.  Bisacodyl.  8.  Sucralfate.  9.  Effexor.    PAST MEDICAL HISTORY:  1.  Asthma.  2.  Migraines.  3.  Seizure disorder.  4.  Obesity.    PAST SURGICAL HISTORY:  1.  Gastroscopy in 2001.  2.  Dental extractions in 2003.    SOCIAL HISTORY:  She currently smokes 1/2 pack per day.  She has not had any   alcohol since 2011 and she has been clean and sober for 7 years.    FAMILY HISTORY:  Father with colorectal cancer, no additional pertinent family   history.    REVIEW OF SYSTEMS:  See the HPI.  Otherwise, all systems are negative per CMS   criteria.    PHYSICAL EXAMINATION:  GENERAL:  Obese  female in no immediate distress, friendly,   cooperative, and appropriate.  VITAL SIGNS:  Afebrile, heart rate 68, respiratory rate 16, blood pressure   134/52, oxygen saturation is 95% on room air.  HEENT:  Normocephalic and atraumatic.  Sclerae are anicteric.  Conjunctivae   are pink.  Oropharynx is moist and clear without visible lesions.  Mallampati   score is 3.  NECK:  No thyroid abnormality or lymphadenopathy.  LUNGS:  Clear to auscultation bilaterally without wheezes.  CARDIOVASCULAR:  Regular rate and rhythm without murmurs.  ABDOMEN:  Bowel sounds are present, soft, nontender, and nondistended.  EXTREMITIES:  No clubbing, cyanosis or edema.  SKIN:  No jaundice or spider angiomata.  NEUROLOGICAL:  Grossly nonfocal.  Alert and  oriented.  PSYCHIATRIC:  Affect is appropriate.    LABORATORY DATA:  See the HPI.    IMAGING STUDIES:  See the HPI.    IMPRESSION:  A 27-year-old female with subacute nausea and vomiting and   diarrhea.  She also has gallbladder polyps noted by imaging.  She has a   history of some reflux symptoms in the past and reports a remote history of   peptic ulcers, although I have no primary data.  I feel like we need to rule   out luminal source of her symptoms such as peptic ulcer disease, gastric   outlet obstruction in the setting of a pyloric ulcer would be an example,   consider the possibility of hiatal hernia or esophagitis.  Colonoscopy would   be warranted to evaluate for source of her diarrhea and abdominal pain.  I   would also entertain the possibility of biliary pathology.  She does have a   gallbladder polyp, which was commented on as small.  I will order a HIDA scan   with CCK to be done today and then she will have a laxative preparation in   preparation for colonoscopy and EGD early tomorrow morning.    PROBLEM LIST:  1.  Nausea and vomiting.  2.  Abdominal pain.  3.  Diarrhea.  4.  Heartburn.  5.  Gallbladder polyp.  6.  Seizure disorder.  7.  Obesity.  8.  Family history of colorectal cancer in her father.    PLAN:  1.  Agree with admission by hospitalist with gastroenterology following   closely.  2.  Hospitalist to manage seizure medications.  If she cannot take orally, may   need intravenous.  3.  HIDA scan with CCK today.  4.  EGD and colonoscopy tomorrow morning.  5.  Plan for laxative preparation today.  6.  Clear liquid diet today, no Reds then n.p.o. after midnight in preparation   for procedures.    I will defer to the hospitalist for management of asthma and seizure disorder   as above.  She appears stable at this time.  I will request anesthesiologist   assistance for her procedures tomorrow.  She is at high risk secondary to   obesity and seizure disorder.    Thank you for allowing me to  participate in the care of this patient.  GI will   obviously continue to follow up closely.       ____________________________________     MD TRACEY PATEL / DIONISIO    DD:  05/24/2018 09:06:10  DT:  05/24/2018 11:04:24    D#:  [id number]  Job#:  [id number]    cc: Laura Pavon MD, Shun Flowers DNP, Tigist Wilhelm MD, BRYANT WALLIS MD

## 2018-05-24 NOTE — RESPIRATORY CARE
"COPD Education by COPD Clinical Educator  5/24/2018 at 1:48 PM by Sofía Pike    Patient interviewed by COPD education team.  Patient refused full COPD program at this time, but agreed to short intervention.  A comprehensive packet including information about smoking cessation given.  Smoking Cessation Intervention 3 -10 minutes completed.  Provided smoking cessation packet with \"Tips to Quit\" and flyer for \"Free Smoking Cessation Classes\". Provided \"Quit Card\" with the phone number to [employer] [facility] for free nicotine replacement therapy.  "

## 2018-05-24 NOTE — CARE PLAN
Problem: Safety  Goal: Will remain free from falls  Outcome: PROGRESSING AS EXPECTED  Patient uses call light, ambulates with standby assist with steady and balanced gait.    Problem: Venous Thromboembolism (VTW)/Deep Vein Thrombosis (DVT) Prevention:  Goal: Patient will participate in Venous Thrombosis (VTE)/Deep Vein Thrombosis (DVT)Prevention Measures  Outcome: PROGRESSING AS EXPECTED

## 2018-05-24 NOTE — PROGRESS NOTES
Pt arrived on [facility], no signs of distress pt denies nausea at this time and states she was able to keep her pills down that she received in the Er. Pt denies pain at this time, recently medicated the ER. Seizure precautions in place. Pt states her last seizure was yesterday around 5 pm. Pt states she is hungry. Pt made aware she is on a clear liquid diet without reds, pt demonstrates understanding.  Family at bedside.

## 2018-05-24 NOTE — PROGRESS NOTES
Patient awake, alert, oriented with clear speech at shift change. Family at bedside. Patient admission profile complete and 2 RN skin assessment complete. Administered zofran at 2045.    2145 Gave rest of medications now that nausea has dissipated. Will monitor for n/v and round frequently due to hx of seizures. Patient currently resting.

## 2018-05-24 NOTE — PROGRESS NOTES
[facility] Hospitalist Progress Note    Date of Service: 2018    Chief Complaint  27 y.o. female admitted 2018 with seizures 2/2 intractable N/V and not tolerating her medications    Interval Problem Update  Patient doing ok this morning, still having some nausea, however no vomiting, no abd pain. No seizure activity while in hospital    Consultants/Specialty  GI    Disposition  tbd        ROS   Constitutional: negative for chills, diaphoresis, fever and malaise/fatigue.   HENT: Negative for congestion.    Eyes: Negative for blurred vision.   Respiratory: Negative for shortness of breath.    Cardiovascular: Negative for chest pain, palpitations and leg swelling.   Gastrointestinal: Negative for abdominal pain,+ for  nausea and vomiting.   Genitourinary: Negative for dysuria.   Musculoskeletal:negative for falls or muscle pain.   Neurological: Positive for seizures, loss of consciousness and weakness. Negative for sensory change, speech change and headaches.   Psychiatric/Behavioral: Negative for substance abuse.   All other systems reviewed and are negative.   Physical Exam  Laboratory/Imaging   Hemodynamics  Temp (24hrs), Av.8 °C (98.3 °F), Min:36.8 °C (98.2 °F), Max:36.9 °C (98.4 °F)   Temperature: 36.9 °C (98.4 °F)  Pulse  Av.6  Min: 61  Max: 80    Blood Pressure: 101/58      Respiratory      Respiration: 16, Pulse Oximetry: 91 %, O2 Daily Delivery Respiratory : Room Air with O2 Available        RUL Breath Sounds: Clear, RML Breath Sounds: Clear, RLL Breath Sounds: Clear;Diminished, ELDON Breath Sounds: Clear, LLL Breath Sounds: Clear;Diminished    Fluids    Intake/Output Summary (Last 24 hours) at 18 0758  Last data filed at 18 1956   Gross per 24 hour   Intake              480 ml   Output                0 ml   Net              480 ml       Nutrition  Orders Placed This Encounter   Procedures   • Diet Order     Standing Status:   Standing     Number of Occurrences:   1     Order Specific  Question:   Diet:     Answer:   Clear Liquids - No Red Foods [12]     Physical Exam  Constitutional: She is oriented to person, place, and time. She appears well-developed. No distress.   obese   HENT:   Head: Normocephalic.   Mouth/Throat: Mucous membranes are dry.   Eyes: EOM are normal. Pupils are equal, round, and reactive to light. No scleral icterus.   Neck: Normal range of motion. Neck supple. No tracheal deviation present.   Cardiovascular: Normal rate, regular rhythm and intact distal pulses.    Pulmonary/Chest: Breath sounds normal. No respiratory distress. She has no wheezes. She has no rales.   Abdominal: Soft. Bowel sounds are normal. She exhibits no distension. slight TTP Musculoskeletal: She exhibits edema (trace ankle edema).   Neurological: She is alert and oriented to person, place, and time.   Skin: Skin is warm and dry.   Psychiatric: Her speech is normal and behavior is normal. Her mood appears normal   Vitals reviewed.  Recent Labs      05/23/18   1510  05/24/18   0422   WBC  10.8  9.7   RBC  4.84  4.39   HEMOGLOBIN  14.7  13.3   HEMATOCRIT  43.5  39.5   MCV  89.9  90.0   MCH  30.4  30.3   MCHC  33.8  33.7   RDW  40.8  41.7   PLATELETCT  392  330   MPV  9.7  10.0     Recent Labs      05/23/18   1510  05/24/18   0422   SODIUM  137  140   POTASSIUM  3.5*  3.7   CHLORIDE  104  109   CO2  24  27   GLUCOSE  101*  96   BUN  8  5*   CREATININE  0.87  0.93   CALCIUM  8.8  8.3*                      Assessment/Plan     * Grand mal seizure (HCC)- (present on admission)   Assessment & Plan    Longstanding history of seizures. 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 Given her longstanding N/V and inability to tolerate adequate PO, she has not held down her keppra for at least 4 days.  - keppra level normalk.  - s/p keppra 500mg PO given in ED  - may need to transition to IV if not able to routinely tolerate  - seizure precautions        Intractable nausea and  vomiting- (present on admission)   Assessment & Plan    Chronic x6 months with associated abdominal pain. Was due for outpatient colonoscopy and upper endoscopy on 5/24 with Cyndee but now will be postponed. Surprising that she does not have more metabolic derangements.   - GI following, greatly appreciate, plans for HIDA scan today to r/o gall bladder disease since patient has risk factors, EGD and Colonoscopy tomorrow  NPO at midnight and start prep  - clear liquid diet, antiemetics  - pain control        Obesity (BMI 30-39.9)- (present on admission)   Assessment & Plan    Will need continued counseling         Tobacco abuse- (present on admission)   Assessment & Plan    Counseled on admission  - Nicotine replacement PRN        Hypokalemia- (present on admission)   Assessment & Plan    Resolved  monitor          Quality-Core Measures   Reviewed items::  Labs reviewed and Medications reviewed  Robin catheter::  No Robin  DVT prophylaxis pharmacological::  Enoxaparin (Lovenox)      Discussed case with patient, nursing staff and Dr. Cotter. Will obtain HIDA scan today, control N/V, monitor for seizure activity  cld if tolerated, bowel prep tonight, NPO at midnight for EGD and S-scope tomorrow

## 2018-05-24 NOTE — ASSESSMENT & PLAN NOTE
Longstanding history of seizures. 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 Given her longstanding N/V and inability to tolerate adequate PO, she has not held down her keppra for at least 4 days.  - keppra level normalk.  - s/p keppra 500mg PO given in ED  - may need to transition to IV if not able to routinely tolerate  - seizure precautions

## 2018-05-24 NOTE — CARE PLAN
Problem: Nutritional:  Goal: Achieve adequate nutritional intake  Advance diet when cleared medically.  Patient will consume 50% of meals once diet advanced.  Outcome: NOT MET

## 2018-05-24 NOTE — H&P
Hospital Medicine History and Physical    Date of Service  5/23/2018    Chief Complaint  Chief Complaint   Patient presents with   • Vomiting     6 months of vomiting, now she cannot hold down her seizure meds and has had 7 seizures prior to arrival       History of Presenting Illness  27 y.o. female who presented 5/23/2018 with 6 months of vomiting and black stool. She states that she has only been able to tolerate roughly 1 yogurt a day but has not been able to stay hydrated or keep her medications down. She has had a seizure disorder since she was detoxing from heroin when she was 10yo and hasn't been able to keep her keppra down for at least 4 days. The day before presentation, she had a witnessed grand mal seizure with confusion, memory loss, and incontinence. No trauma per her wife. Was planned for an upper and lower endoscopy with Dr. Cotter outpatient tomorrow but she came in for evaluation 2/2 her seizure and inability to tolerate PO.    Primary Care Physician  Shun Flowers D.N.P.    Consultants  [facility] Cotter    Code Status  Full    Review of Systems  Review of Systems   Constitutional: Positive for chills, diaphoresis, fever and malaise/fatigue.   HENT: Negative for congestion.    Eyes: Negative for blurred vision.   Respiratory: Negative for shortness of breath.    Cardiovascular: Negative for chest pain, palpitations and leg swelling.   Gastrointestinal: Negative for abdominal pain, nausea and vomiting.   Genitourinary: Negative for dysuria.   Musculoskeletal: Positive for falls.   Neurological: Positive for seizures, loss of consciousness and weakness. Negative for sensory change, speech change and headaches.   Psychiatric/Behavioral: Negative for substance abuse.   All other systems reviewed and are negative.       Past Medical History  Past Medical History:   Diagnosis Date   • Seizure disorder (HCC) 05/21/2018    epilepsy; 5/21/2018 nausea/vomiting difficulty holding down seizure  medication; PCP, Dr. Shun Flowers monitors seizures   • Asthma     Inhaler PRN   • Bowel habit changes     diarrhea   • Heart burn    • Indigestion    • Psychiatric problem     Bipolar       Surgical History  Past Surgical History:   Procedure Laterality Date   • DENTAL EXTRACTION(S)  2003    wisdom teeth   • GASTROSCOPY  2001       Medications  No current facility-administered medications on file prior to encounter.      Current Outpatient Prescriptions on File Prior to Encounter   Medication Sig Dispense Refill   • cyclobenzaprine (FLEXERIL) 5 MG tablet Take 5 mg by mouth 3 times a day as needed (Stomach Pain).     • tramadol (ULTRAM) 50 MG Tab Take 50 mg by mouth 2 times a day as needed (Pain).     • pantoprazole (PROTONIX) 40 MG Tablet Delayed Response Take 40 mg by mouth 2 times a day.     • venlafaxine XR (EFFEXOR XR) 75 MG CAPSULE SR 24 HR Take 75 mg by mouth every day.     • traZODone (DESYREL) 50 MG Tab Take 25-50 mg by mouth at bedtime as needed for Sleep.     • vitamin D, Ergocalciferol, (DRISDOL) 33674 units Cap capsule Take 50,000 Units by mouth every 7 days. Monday     • levETIRAcetam (KEPPRA) 500 MG Tab Take 1,500 mg by mouth 2 times a day.     • ondansetron (ZOFRAN ODT) 8 MG TABLET DISPERSIBLE Take 8 mg by mouth every 8 hours as needed for Nausea.     • sucralfate (CARAFATE) 1 GM Tab Take 1 g by mouth 4 Times a Day,Before Meals and at Bedtime.     • dicyclomine (BENTYL) 20 MG Tab Take 1 Tab by mouth every 6 hours. 120 Tab 0       Family History  No family history on file.  Mom with HIV, hep B and C  Dad with brain aneurysm   Grandmother and Aunt with CKD.    Social History  Social History   Substance Use Topics   • Smoking status: Current Every Day Smoker     Packs/day: 0.50     Years: 4.00     Types: Cigarettes   • Smokeless tobacco: Never Used   • Alcohol use Yes      Comment: none since 2011   Smokes 1/2 to 1 pack/day. Clean and sober for 7 years.     Allergies  Allergies   Allergen Reactions    • Shellfish Allergy Anaphylaxis   • Other Drug      Pt states  Monitored NARCOTICS if possible; recovering addict        Physical Exam  Laboratory   Hemodynamics  Temp (24hrs), Av.8 °C (98.3 °F), Min:36.8 °C (98.2 °F), Max:36.9 °C (98.4 °F)   Temperature: 36.8 °C (98.3 °F)  Pulse  Av.3  Min: 61  Max: 80    Blood Pressure: 124/61      Respiratory      Respiration: 14, Pulse Oximetry: 93 %, O2 Daily Delivery Respiratory : Room Air with O2 Available        RUL Breath Sounds: Clear, RML Breath Sounds: Clear, RLL Breath Sounds: Clear;Diminished, ELDON Breath Sounds: Clear, LLL Breath Sounds: Clear;Diminished    Physical Exam   Constitutional: She is oriented to person, place, and time. She appears well-developed. No distress.   obese   HENT:   Head: Normocephalic.   Mouth/Throat: Mucous membranes are dry.   Eyes: EOM are normal. Pupils are equal, round, and reactive to light. No scleral icterus.   Neck: Normal range of motion. Neck supple. No tracheal deviation present.   Cardiovascular: Normal rate, regular rhythm and intact distal pulses.    Pulmonary/Chest: Breath sounds normal. No respiratory distress. She has no wheezes. She has no rales.   Abdominal: Soft. Bowel sounds are normal. She exhibits no distension. There is tenderness in the epigastric area and left upper quadrant.   Musculoskeletal: She exhibits edema (trace ankle edema).   Neurological: She is alert and oriented to person, place, and time.   Skin: Skin is warm and dry.   Psychiatric: Her speech is normal and behavior is normal. Her mood appears anxious.   Vitals reviewed.      Recent Labs      18   1510   WBC  10.8   RBC  4.84   HEMOGLOBIN  14.7   HEMATOCRIT  43.5   MCV  89.9   MCH  30.4   MCHC  33.8   RDW  40.8   PLATELETCT  392   MPV  9.7     Recent Labs      18   1510   SODIUM  137   POTASSIUM  3.5*   CHLORIDE  104   CO2  24   GLUCOSE  101*   BUN  8   CREATININE  0.87   CALCIUM  8.8     Recent Labs      18   1510   05/23/18 1943   ALTSGPT  30   --    ASTSGOT  22   --    ALKPHOSPHAT  67   --    TBILIRUBIN  0.4   --    LIPASE  20  23   GLUCOSE  101*   --                  Lab Results   Component Value Date    TROPONINI <0.02 04/18/2017     Urinalysis:    Lab Results  Component Value Date/Time   SPECGRAVITY 1.010 05/07/2018 1200   GLUCOSEUR Negative 05/07/2018 1200   KETONES Negative 05/07/2018 1200   NITRITE Negative 05/07/2018 1200   WBCURINE 0-2 05/07/2018 1200   RBCURINE 0-2 05/07/2018 1200   EPITHELCELL Few 05/07/2018 1200        Imaging  No orders to display        Assessment/Plan     I anticipate this patient will require at least two midnights for appropriate medical management, necessitating inpatient admission.    * Grand mal seizure (HCC)- (present on admission)   Assessment & Plan    Longstanding history of seizures. 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 Given her longstanding N/V and inability to tolerate adequate PO, she has not held down her keppra for at least 4 days.  - keppra level was ordered 2x but has been cancelled for unknown reason, repeat order placed stat and is pending.  - s/p keppra 500mg PO given in ED  - may need to transition to IV if not able to routinely tolerate  - seizure precautions        Intractable nausea and vomiting- (present on admission)   Assessment & Plan    Chronic x6 months with associated abdominal pain. Was due for outpatient colonoscopy and upper endoscopy on 5/24 with Giuliaion but now will be postponed. Surprising that she does not have more metabolic derangements.   - GI following, greatly appreciate  - clear liquid diet, antiemetics  - pain control  - plan for scope during this hospitalization but after seizures are controlled        Obesity (BMI 30-39.9)- (present on admission)   Assessment & Plan    Will need continued counseling         Tobacco abuse- (present on admission)   Assessment & Plan    Counseled at least 3 minutes on  05/23/18. Strongly encouraged to quit  - Nicotine replacement PRN        Hypokalemia- (present on admission)   Assessment & Plan    K low at 3.5.  - monitor and replete  - magnesium level pending            VTE prophylaxis: lovenox.

## 2018-05-24 NOTE — DIETARY
"Nutrition services: Day 1 of admit.  Oseas Day is a 27 y.o. female with admitting DX of Dehydration, Intractable Nausea and Vomiting x 6 months.  Hx of Seizure Disorder, Obesity.  Admit Nutrition Screen trigger of poor oral intake for 4 or more days.    Assessment:  Height: 177.8 cm (5' 10\")  Weight: 117.3 kg (258 lb 9.6 oz)  Body mass index is 37.11 kg/m².  Diet/Intake: Clear Liquid, No Red.  PO intake at Breakfast = %.  NPO after midnight for EGD and Colonoscopy tomorrow morning.    Evaluation:   1. Pt at nutrition risk due to nausea and vomiting and reported poor oral intake.      Recommendations/Plan:  1. Clear Liquids, No Red/NPO after midnight as ordered.  2. Recommend advance diet as tolerated when cleared medically.  3. Document intake of all meals as % taken in ADL's to provide interdisciplinary communication across all shifts.   4. Monitor weight.  5. Nutrition rep will see patient for ongoing meal and snack preferences once diet advances.  6. RD to monitor and follow-up with pt once diet advances.             "

## 2018-05-24 NOTE — ASSESSMENT & PLAN NOTE
Chronic x6 months with associated abdominal pain. Was due for outpatient colonoscopy and upper endoscopy on 5/24 with Cyndee but now will be postponed. Surprising that she does not have more metabolic derangements.   - GI following, greatly appreciate EGD and Colonoscopy with biopsy and snare polypectomy performed 5/25/18  >  Nausea and vomiting -- no definite source identified.  It should be noted   that there was very little gastric motility noted during this examination   suggesting she may have a component of gastroparesis/dysmotility.  > Diarrhea -- no obvious source found.  Duodenal and random colon biopsies   were obtained to evaluate for celiac sprue and microscopic colitis.  > Abdominal pain -- no obvious source found.     PLAN AND RECOMMENDATIONS:  1.  Observe for any adverse events from today's procedure.  2.  Await pathology results.  3.  Await HIDA scan with CCK, which has been ordered to evaluate for biliary   dyskinesia, prior ultrasound of the abdomen was unremarkable.  4.  May advance to soft texture diet after HIDA scan with CCK.

## 2018-05-24 NOTE — ED NOTES
Pt given pain medication as directed. Pt states headache and body pain is 7/10.  Family at bedside.

## 2018-05-25 ENCOUNTER — APPOINTMENT (OUTPATIENT)
Dept: RADIOLOGY | Facility: MEDICAL CENTER | Age: 28
DRG: 444 | End: 2018-05-25
Attending: INTERNAL MEDICINE
Payer: MEDICAID

## 2018-05-25 VITALS
RESPIRATION RATE: 18 BRPM | HEIGHT: 70 IN | HEART RATE: 57 BPM | SYSTOLIC BLOOD PRESSURE: 117 MMHG | TEMPERATURE: 97.6 F | DIASTOLIC BLOOD PRESSURE: 54 MMHG | WEIGHT: 258.6 LBS | OXYGEN SATURATION: 93 % | BODY MASS INDEX: 37.02 KG/M2

## 2018-05-25 LAB
ANION GAP SERPL CALC-SCNC: 3 MMOL/L (ref 0–11.9)
BUN SERPL-MCNC: <5 MG/DL (ref 8–22)
CALCIUM SERPL-MCNC: 8.2 MG/DL (ref 8.4–10.2)
CHLORIDE SERPL-SCNC: 110 MMOL/L (ref 96–112)
CO2 SERPL-SCNC: 27 MMOL/L (ref 20–33)
CREAT SERPL-MCNC: 0.93 MG/DL (ref 0.5–1.4)
GLUCOSE SERPL-MCNC: 91 MG/DL (ref 65–99)
PATHOLOGY CONSULT NOTE: NORMAL
POTASSIUM SERPL-SCNC: 4 MMOL/L (ref 3.6–5.5)
SODIUM SERPL-SCNC: 140 MMOL/L (ref 135–145)

## 2018-05-25 PROCEDURE — 88305 TISSUE EXAM BY PATHOLOGIST: CPT | Mod: 59

## 2018-05-25 PROCEDURE — 160002 HCHG RECOVERY MINUTES (STAT): Performed by: INTERNAL MEDICINE

## 2018-05-25 PROCEDURE — 0DB68ZX EXCISION OF STOMACH, VIA NATURAL OR ARTIFICIAL OPENING ENDOSCOPIC, DIAGNOSTIC: ICD-10-PCS | Performed by: INTERNAL MEDICINE

## 2018-05-25 PROCEDURE — A9270 NON-COVERED ITEM OR SERVICE: HCPCS | Performed by: HOSPITALIST

## 2018-05-25 PROCEDURE — 0DB98ZX EXCISION OF DUODENUM, VIA NATURAL OR ARTIFICIAL OPENING ENDOSCOPIC, DIAGNOSTIC: ICD-10-PCS | Performed by: INTERNAL MEDICINE

## 2018-05-25 PROCEDURE — 78227 HEPATOBIL SYST IMAGE W/DRUG: CPT

## 2018-05-25 PROCEDURE — 0DBM8ZZ EXCISION OF DESCENDING COLON, VIA NATURAL OR ARTIFICIAL OPENING ENDOSCOPIC: ICD-10-PCS | Performed by: INTERNAL MEDICINE

## 2018-05-25 PROCEDURE — 160204 HCHG ENDO MINUTES - 1ST 30 MINS LEVEL 5: Performed by: INTERNAL MEDICINE

## 2018-05-25 PROCEDURE — 99239 HOSP IP/OBS DSCHRG MGMT >30: CPT | Performed by: HOSPITALIST

## 2018-05-25 PROCEDURE — 700111 HCHG RX REV CODE 636 W/ 250 OVERRIDE (IP): Performed by: HOSPITALIST

## 2018-05-25 PROCEDURE — 700111 HCHG RX REV CODE 636 W/ 250 OVERRIDE (IP)

## 2018-05-25 PROCEDURE — 88312 SPECIAL STAINS GROUP 1: CPT

## 2018-05-25 PROCEDURE — 160048 HCHG OR STATISTICAL LEVEL 1-5: Performed by: INTERNAL MEDICINE

## 2018-05-25 PROCEDURE — 80048 BASIC METABOLIC PNL TOTAL CA: CPT

## 2018-05-25 PROCEDURE — 0DBE8ZX EXCISION OF LARGE INTESTINE, VIA NATURAL OR ARTIFICIAL OPENING ENDOSCOPIC, DIAGNOSTIC: ICD-10-PCS | Performed by: INTERNAL MEDICINE

## 2018-05-25 PROCEDURE — 500066 HCHG BITE BLOCK, ECT: Performed by: INTERNAL MEDICINE

## 2018-05-25 PROCEDURE — 36415 COLL VENOUS BLD VENIPUNCTURE: CPT

## 2018-05-25 PROCEDURE — 700101 HCHG RX REV CODE 250

## 2018-05-25 PROCEDURE — 700102 HCHG RX REV CODE 250 W/ 637 OVERRIDE(OP): Performed by: HOSPITALIST

## 2018-05-25 PROCEDURE — 160035 HCHG PACU - 1ST 60 MINS PHASE I: Performed by: INTERNAL MEDICINE

## 2018-05-25 PROCEDURE — 160009 HCHG ANES TIME/MIN: Performed by: INTERNAL MEDICINE

## 2018-05-25 PROCEDURE — 160209 HCHG ENDO MINUTES - EA ADDL 1 MIN LEVEL 5: Performed by: INTERNAL MEDICINE

## 2018-05-25 RX ORDER — PEG-3350, SODIUM SULFATE, SODIUM CHLORIDE, POTASSIUM CHLORIDE, SODIUM ASCORBATE AND ASCORBIC ACID 7.5-2.691G
KIT ORAL
Status: ACTIVE
Start: 2018-05-25 | End: 2018-05-25

## 2018-05-25 RX ORDER — SODIUM CHLORIDE, SODIUM LACTATE, POTASSIUM CHLORIDE, CALCIUM CHLORIDE 600; 310; 30; 20 MG/100ML; MG/100ML; MG/100ML; MG/100ML
1000 INJECTION, SOLUTION INTRAVENOUS
Status: CANCELLED | OUTPATIENT
Start: 2018-05-25 | End: 2018-05-25

## 2018-05-25 RX ADMIN — LEVETIRACETAM 1500 MG: 500 TABLET ORAL at 09:11

## 2018-05-25 RX ADMIN — SUCRALFATE 1 G: 1 TABLET ORAL at 10:31

## 2018-05-25 RX ADMIN — SUCRALFATE 1 G: 1 TABLET ORAL at 16:44

## 2018-05-25 RX ADMIN — NICOTINE 14 MG: 14 PATCH, EXTENDED RELEASE TRANSDERMAL at 06:00

## 2018-05-25 RX ADMIN — DICYCLOMINE HYDROCHLORIDE 20 MG: 20 TABLET ORAL at 10:31

## 2018-05-25 RX ADMIN — OMEPRAZOLE 20 MG: 20 CAPSULE, DELAYED RELEASE ORAL at 09:10

## 2018-05-25 RX ADMIN — VENLAFAXINE 75 MG: 75 CAPSULE, EXTENDED RELEASE ORAL at 09:11

## 2018-05-25 RX ADMIN — DICYCLOMINE HYDROCHLORIDE 20 MG: 20 TABLET ORAL at 16:44

## 2018-05-25 ASSESSMENT — PAIN SCALES - GENERAL
PAINLEVEL_OUTOF10: 0

## 2018-05-25 NOTE — PROGRESS NOTES
Results for HIDA scan are back. Dr. Wilhelm paged for update and order (pt requesting to eat and be discharged at this time).

## 2018-05-25 NOTE — PROGRESS NOTES
[facility] Hospitalist Progress Note    Date of Service: 2018    Chief Complaint  27 y.o. female admitted 2018 with seizures 2/2 intractable N/V and not tolerating her medications    Interval Problem Update  Patient doing ok this morning, still having some nausea, however no vomiting, no abd pain. No seizure activity while in hospital    : patient returned from EGD and C-scope, doing well, tolerated procedure well. Minimal pain at this time. No nausea or vomiting    Consultants/Specialty  GI    Disposition  tbd        ROS   Constitutional: negative for chills, diaphoresis, fever and malaise/fatigue.   HENT: Negative for congestion.    Eyes: Negative for blurred vision.   Respiratory: Negative for shortness of breath.    Cardiovascular: Negative for chest pain, palpitations and leg swelling.   Gastrointestinal: Negative for abdominal pain,+ for  nausea and vomiting.   Genitourinary: Negative for dysuria.   Musculoskeletal:negative for falls or muscle pain.   Neurological: Positive for seizures, loss of consciousness and weakness. Negative for sensory change, speech change and headaches.   Psychiatric/Behavioral: Negative for substance abuse.   All other systems reviewed and are negative.   Physical Exam  Laboratory/Imaging   Hemodynamics  Temp (24hrs), Av.5 °C (97.7 °F), Min:36.3 °C (97.3 °F), Max:36.7 °C (98.1 °F)   Temperature: 36.4 °C (97.6 °F)  Pulse  Av.5  Min: 53  Max: 80    Blood Pressure: 120/54      Respiratory      Respiration: 18, Pulse Oximetry: 95 %        RUL Breath Sounds: Clear, RML Breath Sounds: Clear, RLL Breath Sounds: Diminished, ELDON Breath Sounds: Clear, LLL Breath Sounds: Diminished    Fluids    Intake/Output Summary (Last 24 hours) at 18 1043  Last data filed at 18 1000   Gross per 24 hour   Intake             2317 ml   Output              300 ml   Net             2017 ml       Nutrition  Orders Placed This Encounter   Procedures   • DIET NPO     Standing Status:    Standing     Number of Occurrences:   8     Order Specific Question:   Restrict to:     Answer:   Sips with Medications [3]     Physical Exam  Constitutional: She is oriented to person, place, and time. She appears well-developed. No distress.   obese   HENT:   Head: Normocephalic.   Mouth/Throat: Mucous membranes are dry.   Eyes: EOM are normal. Pupils are equal, round, and reactive to light. No scleral icterus.   Neck: Normal range of motion. Neck supple. No tracheal deviation present.   Cardiovascular: Normal rate, regular rhythm and intact distal pulses.    Pulmonary/Chest: Breath sounds normal. No respiratory distress. She has no wheezes. She has no rales.   Abdominal: Soft. Bowel sounds are normal. She exhibits no distension. slight TTP Musculoskeletal: She exhibits edema (trace ankle edema).   Neurological: She is alert and oriented to person, place, and time.   Skin: Skin is warm and dry.   Psychiatric: Her speech is normal and behavior is normal. Her mood appears normal   Vitals reviewed.  Recent Labs      05/23/18   1510  05/24/18   0422   WBC  10.8  9.7   RBC  4.84  4.39   HEMOGLOBIN  14.7  13.3   HEMATOCRIT  43.5  39.5   MCV  89.9  90.0   MCH  30.4  30.3   MCHC  33.8  33.7   RDW  40.8  41.7   PLATELETCT  392  330   MPV  9.7  10.0     Recent Labs      05/23/18   1510  05/24/18   0422  05/25/18   0417   SODIUM  137  140  140   POTASSIUM  3.5*  3.7  4.0   CHLORIDE  104  109  110   CO2  24  27  27   GLUCOSE  101*  96  91   BUN  8  5*  <5*   CREATININE  0.87  0.93  0.93   CALCIUM  8.8  8.3*  8.2*                      Assessment/Plan     * Intractable nausea and vomiting- (present on admission)   Assessment & Plan    Chronic x6 months with associated abdominal pain. Was due for outpatient colonoscopy and upper endoscopy on 5/24 with Cyndee but now will be postponed. Surprising that she does not have more metabolic derangements.   - GI following, greatly appreciate EGD and Colonoscopy with biopsy and snare  polypectomy performed 5/25/18  >  Nausea and vomiting -- no definite source identified.  It should be noted   that there was very little gastric motility noted during this examination   suggesting she may have a component of gastroparesis/dysmotility.  > Diarrhea -- no obvious source found.  Duodenal and random colon biopsies   were obtained to evaluate for celiac sprue and microscopic colitis.  > Abdominal pain -- no obvious source found.     PLAN AND RECOMMENDATIONS:  1.  Observe for any adverse events from today's procedure.  2.  Await pathology results.  3.  Await HIDA scan with CCK, which has been ordered to evaluate for biliary   dyskinesia, prior ultrasound of the abdomen was unremarkable.  4.  May advance to soft texture diet after HIDA scan with CCK.        Obesity (BMI 30-39.9)- (present on admission)   Assessment & Plan    Will need continued counseling         Tobacco abuse- (present on admission)   Assessment & Plan    Counseled on admission  - Nicotine replacement PRN        Grand mal seizure (HCC)- (present on admission)   Assessment & Plan    Longstanding history of seizures. 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 Given her longstanding N/V and inability to tolerate adequate PO, she has not held down her keppra for at least 4 days.  - keppra level normalk.  - s/p keppra 500mg PO given in ED  - may need to transition to IV if not able to routinely tolerate  - seizure precautions        Hypokalemia- (present on admission)   Assessment & Plan    Resolved  monitor          Quality-Core Measures   Reviewed items::  Labs reviewed and Medications reviewed  Robin catheter::  No Robin  DVT prophylaxis pharmacological::  Enoxaparin (Lovenox)      Discussed case with patient, nursing staff and Dr. Cotter.

## 2018-05-25 NOTE — CARE PLAN
Problem: Bowel/Gastric:  Goal: Normal bowel function is maintained or improved  Outcome: PROGRESSING AS EXPECTED  Pt medicated with Zofran and phenergan per MAR for nausea. Moviprep completed, clear rectal effluent.     Problem: Pain Management  Goal: Pain level will decrease to patient's comfort goal  Outcome: PROGRESSING AS EXPECTED  Pt medicated with Oxycodone for pain with improvement in comfort.

## 2018-05-25 NOTE — PROGRESS NOTES
Call back from hosp, received order for GI soft diet. Per Dr. Wilhelm pt needs to stay the night so she can be seen by GI in AM.

## 2018-05-25 NOTE — OR SURGEON
Immediate Post OP Note    PreOp Diagnosis: nausea, vomiting, diarrhea, abdominal pains    PostOp Diagnosis: internal hemorrhoids, external hemorhroids, colon polyp, gastritis, duodenitis suspected, nausea, vomiting, diarrhea, abdominal pains     Procedure(s):  GASTROSCOPY  COLONOSCOPY    Surgeon(s):  Shane Cotter M.D.    Anesthesiologist/Type of Anesthesia:  Anesthesiologist: Crista Rubalcava M.D./* No anesthesia type entered *    Surgical Staff:  Circulator: Jey Swan R.N.  Endoscopy Technician: Alyce Hong; Sharon Santiago    Specimens removed if any:  * No specimens in log *    Estimated Blood Loss: < 5 cc    Findings: Mild gastric erythema, erosions diffusely (mild in severity).  Very little gastric motility noted on observation.  Duodenal bulb erythematous.  Biopsies obtained from D1-D3, and from antrum and body and fundus of stomach.  GEJ and esophagus wnl.   Exernal hemorrhoids with possible thrombosis in one.  Internal hemorrhoids.  Colonic mucosa and distal terminal ileum normal.  Biopsies obtained throughout colon to rule out microscopic colitis.  One small 3 mm sessile polyp in descending colon removed.     Complications: no immediate    IMPRESSION(S):  1) Colon polyp  2) Hemorrhoids  3) Possible gastritis    RECOMMENDATIONS:    1) Await path results  2) Await HIDA with CCK results.  If biliary dyskinesia is suggested I would recommend general surgical consult   3) If HIDA unremarkable okay to feed a soft texture diet after and plan to obtain gastric emptying study (GES)      5/25/2018 7:29 AM Shane Cotter M.D.

## 2018-05-25 NOTE — PROGRESS NOTES
Call to Dr. Cotter per nursing communication. HIDA scan read to Dr. Cotter. Received order to pass on to hospitalist that pt will need a surgery consult for possible justin.  If unable to do surgery tonight pt can have a GI soft diet for dinner, but must be NPO after midnight for possible surgery in AM. If able to do surgery tonight pt needs to stay NPO.  Dr. Wilhelm paged for update, awaiting call back.

## 2018-05-25 NOTE — PROGRESS NOTES
Pt back to floor via gurney by transport on RA.  Pt able to ambulate from gurney to bed. Pt o2 sat 95% on RA. Pt is scheduled for HIDA scan at 1400 today, per nuc med this RN to hold all pain meds and pt only allowed to have sips of water prior, pt updated.

## 2018-05-25 NOTE — CARE PLAN
Problem: Safety  Goal: Will remain free from injury    Intervention: Provide assistance with mobility  Pt mobility assessed at beginning of shift, pt requires standby assist, steady.  Fall precautions in place, non-slip socks on, bed in lowest, locked position and call light is within reach.  Pt educated to call for assistance and verbalizes understanding.         Problem: Knowledge Deficit  Goal: Knowledge of disease process/condition, treatment plan, diagnostic tests, and medications will improve  Educated pt on disease process, treatment plan and reason for medications she is on.  Pt also educated on how to deep breath and cough efficiently.

## 2018-05-25 NOTE — PROGRESS NOTES
Per pre-op RN, Nuclear Medicine called and pt will be on the schedule for 2 PM for the HIDA scan. NPO including no pain meds for 6 hrs prior to procedure.

## 2018-05-25 NOTE — PROCEDURES
DATE OF SERVICE:  05/25/2018    PROCEDURES PERFORMED:  1.  Esophagogastroduodenoscopy with biopsy.  2.  Colonoscopy with biopsy.  3.  Colonoscopy with snare polypectomy.    INSTRUMENT UTILIZED:  1.  Olympus flexible forward viewing gastroscope.  2.  Olympus adult variable stiffness flexible forward viewing colonoscope.    INDICATIONS:  Patient with nausea, vomiting, abdominal pain and diarrhea.    CONSENT:  Full RBA discussion held prior to the procedure and a signed and   witnessed consent form placed on the chart.    SEDATION AND ANESTHESIA:  Provided by Dr. Rubalcava.    TOLERANCE:  Excellent.    LAXATIVE PREPARATION:  MoviPrep.    QUALITY OF PREPARATION:  Good.    PATHOLOGY SPECIMENS:  A.  Duodenal biopsies.  B.  Gastric biopsies.  C.  Random colon biopsies.  D.  Descending colon polyp.    PROCEDURAL DETAILS:  After adequate sedation/anesthesia, the Olympus flexible   forward viewing gastroscope was advanced per the oral route into the   esophagus.  The esophageal mucosa was carefully inspected and appeared   unremarkable with the gastroesophageal junction located 42 cm from the   incisors.  The instrument was passed into the stomach where air was   insufflated and the gastric mucosa inspected including a retroflexed view of   the gastric cardia.  There was diffuse erythema and atrophic appearance in the   antrum of the stomach.  There were few scattered erosions, particularly in   the antrum and in the fundus.  The instrument was passed through the patent   pyloric channel.  The duodenal bulb was slightly erythematous.  The second and   third portions of the duodenum appeared unremarkable.  There was   clear-to-yellow bile flowing per the ampullary orifice intermittently.  Random   biopsies were obtained from the first, second, and third portions of the   duodenum.  The instrument was pulled back into the stomach.  Random biopsies   were obtained from antrum and body.    Next, we conducted colonoscopy.  External  anal exam revealed external   hemorrhoids with evidence of possible thrombosis in one of the hemorrhoids.    Digital rectal exam revealed normal resting anal sphincter tone and no   palpable rectal masses.  The adult colonoscope was advanced per the anal canal   into the rectal vault.  The instrument was carefully advanced to the cecum,   which was identified by the appendiceal orifice and ileocecal valve.  The   instrument was passed into the distal terminal ileum.  The distal 10 cm of   terminal ileum were inspected and were unremarkable endoscopically.  Upon   withdrawal of the colonoscope, the mucosa was washed and suctioned to the best   of my ability.  There was some adherent yellowish bilious appearing stool at   times.  Overall, the prep was good.  Random biopsies were obtained from cecum   through rectum to evaluate for any evidence of microscopic colitis.    Otherwise, the mucosa appeared normal endoscopically.  In the descending colon   about 40 cm from the anal verge, there was noted to be a small 3 mm sessile   polyp.  This was removed using cold snare technique and retrieved.    No immediate complications.    IMPRESSIONS AND FINDINGS:  1.  Colon polyp, small, status post removal and retrieval.  2.  Internal hemorrhoids.  3.  External hemorrhoids.  4.  Gastritis, suspected.  5.  Nausea and vomiting -- no definite source identified.  It should be noted   that there was very little gastric motility noted during this examination   suggesting she may have a component of gastroparesis/dysmotility.  6.  Diarrhea -- no obvious source found.  Duodenal and random colon biopsies   were obtained to evaluate for celiac sprue and microscopic colitis.  7.  Abdominal pain -- no obvious source found.    PLAN AND RECOMMENDATIONS:  1.  Observe for any adverse events from today's procedure.  2.  Await pathology results.  3.  Await HIDA scan with CCK, which has been ordered to evaluate for biliary   dyskinesia, prior  ultrasound of the abdomen was unremarkable.  4.  May advance to soft texture diet after HIDA scan with CCK.       ____________________________________     MD TRACEY PATEL / DIONISIO    DD:  05/25/2018 08:19:28  DT:  05/25/2018 10:40:26    D#:  [id number]  Job#:  [id number]    cc: Laura Pavon MD, Shun Flowers DNP, DARSHANA KING MD, Tigist Wilhelm MD

## 2018-05-25 NOTE — PROGRESS NOTES
Pt back to floor from HIDA scan by transport via wheelchair. Pt requesting to eat and be discharged home. This RN informed pt that the MD needs to look at her results of the HIDA scan first, pt agreed to wait.

## 2018-05-25 NOTE — PROGRESS NOTES
Assumed pt care, pt currently in surgery for Colonoscopy.  Pt is medical. Will assess pain when pt returns to floor and will ensure safety precautions are in place: call light within reach, bed in low position, upper bed rails up, non skid socks on. Will continue to monitor.

## 2018-05-25 NOTE — PROGRESS NOTES
"Call back from Dr. Wilhelm, updated on Dr. Cotter's order. Pt updated that she needs to remain NPO at this time for possible surgical consult based on her HIDA scan results. Pt informed this RN she has \"a  to go to tomorrow in [facility] and needs to leave\".  But then pt stated \"I will stay though, but I'm not happy\".   "

## 2018-05-26 PROBLEM — E87.6 HYPOKALEMIA: Status: RESOLVED | Noted: 2018-05-23 | Resolved: 2018-05-26

## 2018-05-26 PROBLEM — G40.409 GRAND MAL SEIZURE (HCC): Status: RESOLVED | Noted: 2018-05-23 | Resolved: 2018-05-26

## 2018-05-26 PROBLEM — R11.2 INTRACTABLE NAUSEA AND VOMITING: Status: RESOLVED | Noted: 2018-05-23 | Resolved: 2018-05-26

## 2018-05-26 NOTE — DISCHARGE INSTRUCTIONS
Discharge Instructions    Discharged to home by car with relative. Discharged via wheelchair, hospital escort: Yes.  Special equipment needed: Not Applicable    Be sure to schedule a follow-up appointment with your primary care doctor or any specialists as instructed.     Discharge Plan:   Diet Plan: Discussed  Activity Level: Discussed  Smoking Cessation Offered: Patient Counseled  Confirmed Follow up Appointment: Patient to Call and Schedule Appointment  Confirmed Symptoms Management: Discussed  Medication Reconciliation Updated: No (Comments)  Influenza Vaccine Indication: Indicated: 65 years and older (Patient had flu shot last season, not current season)    I understand that a diet low in cholesterol, fat, and sodium is recommended for good health. Unless I have been given specific instructions below for another diet, I accept this instruction as my diet prescription.   Other diet: Low Fat    Special Instructions: None    · Is patient discharged on Warfarin / Coumadin?   No     Depression / Suicide Risk    As you are discharged from this [facility] facility, it is important to learn how to keep safe from harming yourself.    Recognize the warning signs:  · Abrupt changes in personality, positive or negative- including increase in energy   · Giving away possessions  · Change in eating patterns- significant weight changes-  positive or negative  · Change in sleeping patterns- unable to sleep or sleeping all the time   · Unwillingness or inability to communicate  · Depression  · Unusual sadness, discouragement and loneliness  · Talk of wanting to die  · Neglect of personal appearance   · Rebelliousness- reckless behavior  · Withdrawal from people/activities they love  · Confusion- inability to concentrate     If you or a loved one observes any of these behaviors or has concerns about self-harm, here's what you can do:  · Talk about it- your feelings and reasons for harming yourself  · Remove any means that you  might use to hurt yourself (examples: pills, rope, extension cords, firearm)  · Get professional help from the community (Mental Health, Substance Abuse, psychological counseling)  · Do not be alone:Call your Safe Contact- someone whom you trust who will be there for you.  · Call your local CRISIS HOTLINE 793-7514 or 152-578-0555  · Call your local Children's Mobile Crisis Response Team [place] (944) 242-9040 or www.[employer]â„¢  · Call the toll free National Suicide Prevention Hotlines   · National Suicide Prevention Lifeline 437-341-PTLU (4898)  · Pull Line Network 800-SUICIDE (234-8376)      Seizure, Adult  When you have a seizure:  · Parts of your body may move.  · How aware or awake (conscious) you are may change.  · You may shake (convulse).  Some people have symptoms right before a seizure happens. These symptoms may include:  · Fear.  · Worry (anxiety).  · Feeling like you are going to throw up (nausea).  · Feeling like the room is spinning (vertigo).  · Feeling like you saw or heard something before (carola vu).  · Odd tastes or smells.  · Changes in vision, such as seeing flashing lights or spots.  Seizures usually last from 30 seconds to 2 minutes. Usually, they are not harmful unless they last a long time.  Follow these instructions at home:  Medicines  · Take over-the-counter and prescription medicines only as told by your doctor.  · Avoid anything that may keep your medicine from working, such as alcohol.  Activity  · Do not do any activities that would be dangerous if you had another seizure, like driving or swimming. Wait until your doctor approves.  · If you live in the [place] ask your local DMV (department of [employer]) when you can drive.  · Rest.  Teaching others  · Teach friends and family what to do when you have a seizure. They should:  ¨ Lay you on the ground.  ¨ Protect your head and body.  ¨ Loosen any tight clothing around your neck.  ¨ Turn you on your side.  ¨ Stay  with you until you are better.  ¨ Not hold you down.  ¨ Not put anything in your mouth.  ¨ Know whether or not you need emergency care.  General instructions  · Contact your doctor each time you have a seizure.  · Avoid anything that gives you seizures.  · Keep a seizure diary. Write down:  ¨ What you think caused each seizure.  ¨ What you remember about each seizure.  · Keep all follow-up visits as told by your doctor. This is important.  Contact a doctor if:  · You have another seizure.  · You have seizures more often.  · There is any change in what happens during your seizures.  · You continue to have seizures with treatment.  · You have symptoms of being sick or having an infection.  Get help right away if:  · You have a seizure:  ¨ That lasts longer than 5 minutes.  ¨ That is different than seizures you had before.  ¨ That makes it harder to breathe.  ¨ After you hurt your head.  · After a seizure, you cannot speak or use a part of your body.  · After a seizure, you are confused or have a bad headache.  · You have two or more seizures in a row.  · You are having seizures more often.  · You do not wake up right after a seizure.  · You get hurt during a seizure.  In an emergency:  · These symptoms may be an emergency. Do not wait to see if the symptoms will go away. Get medical help right away. Call your local emergency services (911 in the [place] Do not drive yourself to the hospital.  This information is not intended to replace advice given to you by your health care provider. Make sure you discuss any questions you have with your health care provider.  Document Released: 06/05/2009 Document Revised: 08/30/2017 Document Reviewed: 08/30/2017  Elsevier Interactive Patient Education © 2017 [employer]      Laparoscopic Cholecystectomy  Laparoscopic cholecystectomy is surgery to remove the gallbladder. The gallbladder is a pear-shaped organ that lies beneath the liver on the right side of the body. The  gallbladder stores bile, which is a fluid that helps the body to digest fats. Cholecystectomy is often done for inflammation of the gallbladder (cholecystitis). This condition is usually caused by a buildup of gallstones (cholelithiasis) in the gallbladder. Gallstones can block the flow of bile, which can result in inflammation and pain. In severe cases, emergency surgery may be required.  This procedure is done though small incisions in your abdomen (laparoscopic surgery). A thin scope with a camera (laparoscope) is inserted through one incision. Thin surgical instruments are inserted through the other incisions. In some cases, a laparoscopic procedure may be turned into a type of surgery that is done through a larger incision (open surgery).  Tell a health care provider about:  · Any allergies you have.  · All medicines you are taking, including vitamins, herbs, eye drops, creams, and over-the-counter medicines.  · Any problems you or family members have had with anesthetic medicines.  · Any blood disorders you have.  · Any surgeries you have had.  · Any medical conditions you have.  · Whether you are pregnant or may be pregnant.  What are the risks?  Generally, this is a safe procedure. However, problems may occur, including:  · Infection.  · Bleeding.  · Allergic reactions to medicines.  · Damage to other structures or organs.  · A stone remaining in the common bile duct. The common bile duct carries bile from the gallbladder into the small intestine.  · A bile leak from the cyst duct that is clipped when your gallbladder is removed.  What happens before the procedure?  Staying hydrated   Follow instructions from your health care provider about hydration, which may include:  · Up to 2 hours before the procedure - you may continue to drink clear liquids, such as water, clear fruit juice, black coffee, and plain tea.  Eating and drinking restrictions   Follow instructions from your health care provider about  eating and drinking, which may include:  · 8 hours before the procedure - stop eating heavy meals or foods such as meat, fried foods, or fatty foods.  · 6 hours before the procedure - stop eating light meals or foods, such as toast or cereal.  · 6 hours before the procedure - stop drinking milk or drinks that contain milk.  · 2 hours before the procedure - stop drinking clear liquids.  Medicines  · Ask your health care provider about:  ¨ Changing or stopping your regular medicines. This is especially important if you are taking diabetes medicines or blood thinners.  ¨ Taking medicines such as aspirin and ibuprofen. These medicines can thin your blood. Do not take these medicines before your procedure if your health care provider instructs you not to.  · You may be given antibiotic medicine to help prevent infection.  General instructions  · Let your health care provider know if you develop a cold or an infection before surgery.  · Plan to have someone take you home from the hospital or clinic.  · Ask your health care provider how your surgical site will be marked or identified.  What happens during the procedure?  · To reduce your risk of infection:  ¨ Your health care team will wash or sanitize their hands.  ¨ Your skin will be washed with soap.  ¨ Hair may be removed from the surgical area.  · An IV tube may be inserted into one of your veins.  · You will be given one or more of the following:  ¨ A medicine to help you relax (sedative).  ¨ A medicine to make you fall asleep (general anesthetic).  · A breathing tube will be placed in your mouth.  · Your surgeon will make several small cuts (incisions) in your abdomen.  · The laparoscope will be inserted through one of the small incisions. The camera on the laparoscope will send images to a TV screen (monitor) in the operating room. This lets your surgeon see inside your abdomen.  · Air-like gas will be pumped into your abdomen. This will expand your abdomen to give  the surgeon more room to perform the surgery.  · Other tools that are needed for the procedure will be inserted through the other incisions. The gallbladder will be removed through one of the incisions.  · Your common bile duct may be examined. If stones are found in the common bile duct, they may be removed.  · After your gallbladder has been removed, the incisions will be closed with stitches (sutures), staples, or skin glue.  · Your incisions may be covered with a bandage (dressing).  The procedure may vary among health care providers and hospitals.  What happens after the procedure?  · Your blood pressure, heart rate, breathing rate, and blood oxygen level will be monitored until the medicines you were given have worn off.  · You will be given medicines as needed to control your pain.  · Do not drive for 24 hours if you were given a sedative.  This information is not intended to replace advice given to you by your health care provider. Make sure you discuss any questions you have with your health care provider.  Document Released: 12/18/2006 Document Revised: 07/09/2017 Document Reviewed: 06/05/2017  [employer] Interactive Patient Education © 2017 [employer]    Low-Fat Diet for Pancreatitis or Gallbladder Conditions  A low-fat diet can be helpful if you have pancreatitis or a gallbladder condition. With these conditions, your pancreas and gallbladder have trouble digesting fats. A healthy eating plan with less fat will help rest your pancreas and gallbladder and reduce your symptoms.  What do I need to know about this diet?  Eat a low-fat diet.  Reduce your fat intake to less than 20-30% of your total daily calories. This is less than 50-60 g of fat per day.  Remember that you need some fat in your diet. Ask your dietician what your daily goal should be.  Choose nonfat and low-fat healthy foods. Look for the words “nonfat,” “low fat,” or “fat free.”  As a guide, look on the label and choose foods with less than  3 g of fat per serving. Eat only one serving.  Avoid alcohol.  Do not smoke. If you need help quitting, talk with your health care provider.  Eat small frequent meals instead of three large heavy meals.  What foods can I eat?  Grains   Include healthy grains and starches such as potatoes, wheat bread, fiber-rich cereal, and brown rice. Choose whole grain options whenever possible. In adults, whole grains should account for 45-65% of your daily calories.  Fruits and Vegetables   Eat plenty of fruits and vegetables. Fresh fruits and vegetables add fiber to your diet.  Meats and Other Protein Sources   Eat lean meat such as chicken and pork. Trim any fat off of meat before cooking it. Eggs, fish, and beans are other sources of protein. In adults, these foods should account for 10-35% of your daily calories.  Dairy   Choose low-fat milk and dairy options. Dairy includes fat and protein, as well as calcium.  Fats and Oils   Limit high-fat foods such as fried foods, sweets, baked goods, sugary drinks.  Other   Creamy sauces and condiments, such as mayonnaise, can add extra fat. Think about whether or not you need to use them, or use smaller amounts or low fat options.  What foods are not recommended?  High fat foods, such as:  Baked goods.  Ice cream.  Syrian toast.  Sweet rolls.  Pizza.  Cheese bread.  Foods covered with batter, butter, creamy sauces, or cheese.  Fried foods.  Sugary drinks and desserts.  Foods that cause gas or bloating  This information is not intended to replace advice given to you by your health care provider. Make sure you discuss any questions you have with your health care provider.  Document Released: 12/23/2014 Document Revised: 05/25/2017 Document Reviewed: 12/01/2014  [employer] Interactive Patient Education © 2017 [employer] Inc.

## 2018-05-26 NOTE — PROGRESS NOTES
Discharge instructions given to patient at bedside, verbalizes understanding and states plans for follow-up with doctors this following Tuesday to schedule lap coli. New and home medication review, post-discharge activity level and worsening of symptoms needing follow-up care discussed. IV cathlon removed. All belongings accounted for, all questions answered at this time. Patient refused wheelchair escort and left with family on foot.

## 2018-05-26 NOTE — DISCHARGE SUMMARY
CHIEF COMPLAINT ON ADMISSION  Chief Complaint   Patient presents with   • Vomiting     6 months of vomiting, now she cannot hold down her seizure meds and has had 7 seizures prior to arrival       CODE STATUS  Prior    HPI & HOSPITAL COURSE  27 y.o. female who presented 2018 with 6 months of vomiting and black stool. She states that she has only been able to tolerate roughly 1 yogurt a day but has not been able to stay hydrated or keep her medications down. She has had a seizure disorder since she was detoxing from heroin when she was 10yo and hasn't been able to keep her keppra down for at least 4 days. The day before presentation, she had a witnessed grand mal seizure with confusion, memory loss, and incontinence. Was planned for an upper and lower endoscopy with Dr. Cotter outpatient but she came in for evaluation 2/2 her seizure and inability to tolerate PO.Patient was admitted and GI was consulted. Labs were monitored, no seizure activity while in the hospital. She was started in IVF and vitals were stable. Electrolytes were replaced.     GI performed:  PROCEDURES PERFORMED:  1.  Esophagogastroduodenoscopy with biopsy.  2.  Colonoscopy with biopsy.  3.  Colonoscopy with snare polypectomy.    Biopsy are sent out. Patients symptoms improved after the procedure  HIDA scan was performed after the surgery and showed biliary dyskinesia with EF 12%, no acute cholecystitis or obstruction was noted. Per GI recommendation Surgery was consulted for a cholecystectomy however patients symptoms improved and she states she has to go to a  tomorrow and would rather do the surgery on a outpatient basis. Discussed it with Dr. Cotter and Dr. Goins from surgery and everyone agreed with the above plan. Patient to call Dr. Tate office on Tuesday and he will get her set up for elective surgery next week. Patient understood and agreed with the above plan. patient discharged in safeand stable medical  conditions    The patient met 2-midnight criteria for an inpatient stay at the time of discharge.    Therefore, she is discharged in fair and stable condition with close outpatient follow-up.    SPECIFIC OUTPATIENT FOLLOW-UP  pcp  GI  Surgery in 1 week for elective cholecystectomy    DISCHARGE PROBLEM LIST  Principal Problem (Resolved):    Intractable nausea and vomiting POA: Yes  Active Problems:    Tobacco abuse POA: Yes    Obesity (BMI 30-39.9) POA: Yes  Resolved Problems:    Hypokalemia POA: Yes    Grand mal seizure (HCC) POA: Yes      FOLLOW UP  No future appointments.  Shane Cotter M.D.  [address]  [place] [place] [facility] [address]  [phone]    Call in 3 days  to schedule surgery    Riley Fall M.D.  [address]  [facility] [id number] [phone]    Call in 3 days  to schedule surgery    Shun Flowers D.N.P.  [address]  [facility] [place] [facility] [address] [phone]            MEDICATIONS ON DISCHARGE   Ose[facility] Day   Home Medication Instructions ERIC:[id number]    Printed on:05/26/18 1114   Medication Information                      albuterol 108 (90 Base) MCG/ACT Aero Soln inhalation aerosol  Inhale 2 Puffs by mouth every 6 hours as needed for Shortness of Breath.             cyclobenzaprine (FLEXERIL) 5 MG tablet  Take 5 mg by mouth 3 times a day as needed (Stomach Pain).             dicyclomine (BENTYL) 20 MG Tab  Take 1 Tab by mouth every 6 hours.             levETIRAcetam (KEPPRA) 500 MG Tab  Take 1,500 mg by mouth 2 times a day.             ondansetron (ZOFRAN ODT) 8 MG TABLET DISPERSIBLE  Take 8 mg by mouth every 8 hours as needed for Nausea.             pantoprazole (PROTONIX) 40 MG Tablet Delayed Response  Take 40 mg by mouth 2 times a day.             sucralfate (CARAFATE) 1 GM Tab  Take 1 g by mouth 4 Times a Day,Before Meals and at Bedtime.             tramadol (ULTRAM) 50 MG Tab  Take 50 mg by mouth 2 times a day as needed (Pain).             traZODone (DESYREL) 50 MG  Tab  Take 25-50 mg by mouth at bedtime as needed for Sleep.             venlafaxine XR (EFFEXOR XR) 75 MG CAPSULE SR 24 HR  Take 75 mg by mouth every day.             vitamin D, Ergocalciferol, (DRISDOL) 97395 units Cap capsule  Take 50,000 Units by mouth every 7 days. Monday                 DIET  No orders of the defined types were placed in this encounter.      ACTIVITY  As tolerated.  Weight bearing as tolerated      CONSULTATIONS  none    PROCEDURES  none    LABORATORY  Lab Results   Component Value Date/Time    SODIUM 140 05/25/2018 04:17 AM    POTASSIUM 4.0 05/25/2018 04:17 AM    CHLORIDE 110 05/25/2018 04:17 AM    CO2 27 05/25/2018 04:17 AM    GLUCOSE 91 05/25/2018 04:17 AM    BUN <5 (L) 05/25/2018 04:17 AM    CREATININE 0.93 05/25/2018 04:17 AM        Lab Results   Component Value Date/Time    WBC 9.7 05/24/2018 04:22 AM    HEMOGLOBIN 13.3 05/24/2018 04:22 AM    HEMATOCRIT 39.5 05/24/2018 04:22 AM    PLATELETCT 330 05/24/2018 04:22 AM        Total time of the discharge process exceeds 40 minutes

## 2018-05-26 NOTE — PROGRESS NOTES
Call from Dr. Wilhelm who informed this RN after speaking with Dr. Cotter that pt can be discharged at this time and follow-up with GI. Pt to call Tuesday haris mello an appointment with Dr. Fall ([facility]) [phone].  Pt to be discharged on a low fat diet.

## 2018-05-26 NOTE — PROGRESS NOTES
Call from Dr. Wilhelm who informed this RN that pts case is not emergent and therefore she will follow up with surgery in the AM for possible surgery tomorrow. Received order to make pt NPO at midnight. Dr. Wilhelm informed that pt has a  to go to tomorrow in [facility] and pt is asking if this surgery can be done at a later date.  Dr. Wilhelm told this RN she will call Dr. Cotter to ask and will update this RN after. Awaiting call back.

## 2018-08-31 ENCOUNTER — APPOINTMENT (OUTPATIENT)
Dept: RADIOLOGY | Facility: MEDICAL CENTER | Age: 28
End: 2018-08-31
Attending: EMERGENCY MEDICINE

## 2018-08-31 ENCOUNTER — HOSPITAL ENCOUNTER (EMERGENCY)
Facility: MEDICAL CENTER | Age: 28
End: 2018-09-01
Attending: EMERGENCY MEDICINE

## 2018-08-31 DIAGNOSIS — O20.0 ABORTION, THREATENED: ICD-10-CM

## 2018-08-31 DIAGNOSIS — R10.9 ABDOMINAL PAIN, UNSPECIFIED ABDOMINAL LOCATION: ICD-10-CM

## 2018-08-31 LAB
ALBUMIN SERPL BCP-MCNC: 3.9 G/DL (ref 3.2–4.9)
ALBUMIN/GLOB SERPL: 1.4 G/DL
ALP SERPL-CCNC: 71 U/L (ref 30–99)
ALT SERPL-CCNC: 49 U/L (ref 2–50)
ANION GAP SERPL CALC-SCNC: 9 MMOL/L (ref 0–11.9)
AST SERPL-CCNC: 23 U/L (ref 12–45)
B-HCG SERPL-ACNC: ABNORMAL MIU/ML (ref 0–5)
BASOPHILS # BLD AUTO: 0.6 % (ref 0–1.8)
BASOPHILS # BLD: 0.11 K/UL (ref 0–0.12)
BILIRUB SERPL-MCNC: 0.2 MG/DL (ref 0.1–1.5)
BUN SERPL-MCNC: 9 MG/DL (ref 8–22)
CALCIUM SERPL-MCNC: 8.9 MG/DL (ref 8.5–10.5)
CHLORIDE SERPL-SCNC: 105 MMOL/L (ref 96–112)
CO2 SERPL-SCNC: 23 MMOL/L (ref 20–33)
CREAT SERPL-MCNC: 0.69 MG/DL (ref 0.5–1.4)
EOSINOPHIL # BLD AUTO: 0.61 K/UL (ref 0–0.51)
EOSINOPHIL NFR BLD: 3.4 % (ref 0–6.9)
ERYTHROCYTE [DISTWIDTH] IN BLOOD BY AUTOMATED COUNT: 44 FL (ref 35.9–50)
GLOBULIN SER CALC-MCNC: 2.8 G/DL (ref 1.9–3.5)
GLUCOSE SERPL-MCNC: 102 MG/DL (ref 65–99)
HCT VFR BLD AUTO: 38.3 % (ref 37–47)
HGB BLD-MCNC: 12.9 G/DL (ref 12–16)
IMM GRANULOCYTES # BLD AUTO: 0.09 K/UL (ref 0–0.11)
IMM GRANULOCYTES NFR BLD AUTO: 0.5 % (ref 0–0.9)
LYMPHOCYTES # BLD AUTO: 5.09 K/UL (ref 1–4.8)
LYMPHOCYTES NFR BLD: 28.4 % (ref 22–41)
MCH RBC QN AUTO: 29.9 PG (ref 27–33)
MCHC RBC AUTO-ENTMCNC: 33.7 G/DL (ref 33.6–35)
MCV RBC AUTO: 88.9 FL (ref 81.4–97.8)
MONOCYTES # BLD AUTO: 1.26 K/UL (ref 0–0.85)
MONOCYTES NFR BLD AUTO: 7 % (ref 0–13.4)
NEUTROPHILS # BLD AUTO: 10.78 K/UL (ref 2–7.15)
NEUTROPHILS NFR BLD: 60.1 % (ref 44–72)
NRBC # BLD AUTO: 0 K/UL
NRBC BLD-RTO: 0 /100 WBC
PLATELET # BLD AUTO: 310 K/UL (ref 164–446)
PMV BLD AUTO: 9.6 FL (ref 9–12.9)
POTASSIUM SERPL-SCNC: 3.8 MMOL/L (ref 3.6–5.5)
PROT SERPL-MCNC: 6.7 G/DL (ref 6–8.2)
RBC # BLD AUTO: 4.31 M/UL (ref 4.2–5.4)
SODIUM SERPL-SCNC: 137 MMOL/L (ref 135–145)
WBC # BLD AUTO: 17.9 K/UL (ref 4.8–10.8)

## 2018-08-31 PROCEDURE — 36415 COLL VENOUS BLD VENIPUNCTURE: CPT

## 2018-08-31 PROCEDURE — 84702 CHORIONIC GONADOTROPIN TEST: CPT

## 2018-08-31 PROCEDURE — 85025 COMPLETE CBC W/AUTO DIFF WBC: CPT

## 2018-08-31 PROCEDURE — 99284 EMERGENCY DEPT VISIT MOD MDM: CPT

## 2018-08-31 PROCEDURE — 80053 COMPREHEN METABOLIC PANEL: CPT

## 2018-09-01 ENCOUNTER — HOSPITAL ENCOUNTER (OUTPATIENT)
Dept: RADIOLOGY | Facility: MEDICAL CENTER | Age: 28
End: 2018-09-01

## 2018-09-01 VITALS
HEIGHT: 69 IN | OXYGEN SATURATION: 97 % | HEART RATE: 75 BPM | BODY MASS INDEX: 39.25 KG/M2 | TEMPERATURE: 97.5 F | WEIGHT: 264.99 LBS | SYSTOLIC BLOOD PRESSURE: 127 MMHG | DIASTOLIC BLOOD PRESSURE: 65 MMHG | RESPIRATION RATE: 16 BRPM

## 2018-09-01 LAB
NUMBER OF RH DOSES IND 8505RD: NORMAL
RH BLD: NORMAL

## 2018-09-01 PROCEDURE — 86901 BLOOD TYPING SEROLOGIC RH(D): CPT

## 2018-09-01 PROCEDURE — 74181 MRI ABDOMEN W/O CONTRAST: CPT

## 2018-09-01 NOTE — DISCHARGE INSTRUCTIONS
Abdominal Pain, Possible Early Appendicitis  Abdominal (belly) pain can be caused by many things. Your caregiver decides the seriousness of your pain by an exam and possibly blood tests and X-rays. Many cases can be observed and treated at home. Most abdominal pain in children is functional. This means it is not caused by a disease. It will probably improve without treatment.  At this time, your caregiver feels that the abdominal pain could possibly be caused by early appendicitis. This means that you will require follow-up. You may be allowed to go home but may need to return for re-examination and repeat lab work.   HOME CARE INSTRUCTIONS   · Do not take or give laxatives unless directed by your caregiver.   · Take pain medication only if ordered by your caregiver.   · Take no food or water by mouth unless instructed to do so by your caregiver.   SEEK IMMEDIATE MEDICAL CARE IF:   · The pain does not go away or becomes much worse.   · An oral temperature above 102° F (38.9° C) develops.   · Repeated vomiting occurs.   · Blood is being passed in stools (bright red or black tarry stools).   · You develop blood in the urine or cannot pass your urine.   · You develop severe pain in other parts of your body.   MAKE SURE YOU:   · Understand these instructions.   · Will watch your condition.   · Will get help right away if you are not doing well or get worse.   Document Released: 01/06/2009 Document Revised: 03/11/2013 Document Reviewed: 01/06/2009  ExitCare® Patient Information ©2013 [employer].    Abdominal Pain During Pregnancy  Belly (abdominal) pain is common during pregnancy. Most of the time, it is not a serious problem. Other times, it can be a sign that something is wrong with the pregnancy. Always tell your doctor if you have belly pain.  Follow these instructions at home:  Monitor your belly pain for any changes. The following actions may help you feel better:  · Do not have sex (intercourse) or put anything  in your vagina until you feel better.  · Rest until your pain stops.  · Drink clear fluids if you feel sick to your stomach (nauseous). Do not eat solid food until you feel better.  · Only take medicine as told by your doctor.  · Keep all doctor visits as told.  Get help right away if:  · You are bleeding, leaking fluid, or pieces of tissue come out of your vagina.  · You have more pain or cramping.  · You keep throwing up (vomiting).  · You have pain when you pee (urinate) or have blood in your pee.  · You have a fever.  · You do not feel your baby moving as much.  · You feel very weak or feel like passing out.  · You have trouble breathing, with or without belly pain.  · You have a very bad headache and belly pain.  · You have fluid leaking from your vagina and belly pain.  · You keep having watery poop (diarrhea).  · Your belly pain does not go away after resting, or the pain gets worse.  This information is not intended to replace advice given to you by your health care provider. Make sure you discuss any questions you have with your health care provider.  Document Released: 12/06/2010 Document Revised: 07/26/2017 Document Reviewed: 07/17/2014  [employer] Interactive Patient Education © 2017 [employer]    Threatened Miscarriage  A threatened miscarriage is when you have vaginal bleeding during your first 20 weeks of pregnancy but the pregnancy has not ended. Your doctor will do tests to make sure you are still pregnant. The cause of the bleeding may not be known. This condition does not mean your pregnancy will end. It does increase the risk of it ending (complete miscarriage).  Follow these instructions at home:  · Make sure you keep all your doctor visits for prenatal care.  · Get plenty of rest.  · Do not have sex or use tampons if you have vaginal bleeding.  · Do not douche.  · Do not smoke or use drugs.  · Do not drink alcohol.  · Avoid caffeine.  Contact a doctor if:  · You have light bleeding from your  vagina.  · You have belly pain or cramping.  · You have a fever.  Get help right away if:  · You have heavy bleeding from your vagina.  · You have clots of blood coming from your vagina.  · You have bad pain or cramps in your low back or belly.  · You have fever, chills, and bad belly pain.  This information is not intended to replace advice given to you by your health care provider. Make sure you discuss any questions you have with your health care provider.  Document Released: 11/30/2009 Document Revised: 05/25/2017 Document Reviewed: 10/14/2014  [employer] Interactive Patient Education © 2017 [employer]

## 2018-09-01 NOTE — ED TRIAGE NOTES
Pt c/o of abd pain, was sent by Md wall from  for appendix r/o..  Pt currently cramping 6 weeks pregnant, no spotting.

## 2018-09-01 NOTE — ED PROVIDER NOTES
ED Provider Note    CHIEF COMPLAINT  Chief Complaint   Patient presents with   • Sent by MD   • Abdominal Cramps   • N/V       HPI  Oseas Day is a 27 y.o. female here for evaluation of right lower abdominal pain.  The patient states that she is currently 6 weeks pregnant, and is experiencing right lower abdominal pain that started a couple of days ago.  She has some cramping, but no vaginal bleeding.  She does complain of some nausea vomiting as well.  She was seen and evaluated over at [facility], and found to have a live IUP.  She had some right lower quadrant pain that was persistent, so she was sent here to have an MRI of the abdomen.  Dr. Freed over at [facility], contacted general surgery over there, Dr. Lafleur and she recommended transport here for MRI and surgical consult.    PAST MEDICAL HISTORY   has a past medical history of Asthma; Bowel habit changes; Heart burn; Indigestion; Psychiatric problem; and Seizure disorder (HCC) (05/21/2018).    SOCIAL HISTORY  Social History     Social History Main Topics   • Smoking status: Former Smoker     Packs/day: 0.50     Years: 4.00     Types: Cigarettes   • Smokeless tobacco: Never Used   • Alcohol use Yes      Comment: none since 2011   • Drug use: Yes     Types: Intravenous      Comment: none since 2010   • Sexual activity: Not on file       Family History    SURGICAL HISTORY   has a past surgical history that includes dental extraction(s) (2003); gastroscopy (2001); gastroscopy (5/25/2018); colonoscopy (5/25/2018); and cholecystectomy (05/2018).    CURRENT MEDICATIONS  Home Medications    **Home medications have not yet been reviewed for this encounter**         ALLERGIES  Allergies   Allergen Reactions   • Shellfish Allergy Anaphylaxis   • Other Drug      Pt states  Monitored NARCOTICS if possible; recovering addict       REVIEW OF SYSTEMS  See HPI for further details. Review of systems as above, otherwise all other  systems are negative.     PHYSICAL EXAM  Constitutional: Well developed, well nourished. No acute distress.  HEENT: Normocephalic, atraumatic. Posterior pharynx clear and moist.  Eyes:  EOMI. Normal sclera.  Neck: Supple, Full range of motion, nontender.  Chest/Pulmonary: clear to ausculation. Symmetrical expansion.   Cardio: Regular rate and rhythm with no murmur.   Abdomen: Soft, right lower quadrant tenderness palpation, mild suprapubic tenderness, no peritoneal signs. No guarding. No palpable masses.  Musculoskeletal: No deformity, no edema, neurovascular intact.   Neuro: Clear speech, appropriate, cooperative, cranial nerves II-XII grossly intact.  Psych: Normal mood and affect    PROCEDURES     MEDICAL RECORD  I have reviewed patient's medical record and pertinent results are listed above.    COURSE & MEDICAL DECISION MAKING  I have reviewed any medical record information, laboratory studies and radiographic results as noted above.    If you have had any blood pressure issues while here in the emergency department, please see your doctor for a further evaluation or work up.    12:45 AM  The patient is resting comfortably on the gurney and not in any distress.  Her MRI shows that her appendix is in the upper limits of normal, without any surrounding inflammation.  At this time she will be discharged home, and will return for any further issues or concerns.  She understands that she is to return for increasing pain, vomiting, or any fever.  She is comfortable with the plan, and agrees to return    Differential diagnoses include but not limited to: Miscarriage, appendicitis, UTI    This patient presents with abdominal pain.  At this time, I have counseled the patient/family regarding their medications, pain control, and follow up.  They will continue their medications, if any, as prescribed.  They will return immediately for any worsening symptoms and/or any other medical concerns.  They will see their doctor, or  contact the doctor provided, in 1-2 days for follow up.       FINAL IMPRESSION  1. Abdominal pain, unspecified abdominal location      2. , threatened          Electronically signed by: Juarez Eubanks, 2018 10:59 PM

## 2018-11-23 ENCOUNTER — HOSPITAL ENCOUNTER (EMERGENCY)
Dept: HOSPITAL 8 - ED | Age: 28
Discharge: HOME | End: 2018-11-23
Payer: COMMERCIAL

## 2018-11-23 VITALS — SYSTOLIC BLOOD PRESSURE: 111 MMHG | DIASTOLIC BLOOD PRESSURE: 55 MMHG

## 2018-11-23 VITALS — HEIGHT: 70 IN | BODY MASS INDEX: 37.21 KG/M2 | WEIGHT: 259.93 LBS
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
## 2018-11-23 DIAGNOSIS — Z3A.19: ICD-10-CM

## 2018-11-23 DIAGNOSIS — S39.012A: ICD-10-CM

## 2018-11-23 DIAGNOSIS — B34.9: ICD-10-CM
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
## 2018-11-23 DIAGNOSIS — O98.512: ICD-10-CM

## 2018-11-23 DIAGNOSIS — O9A.212: Primary | ICD-10-CM

## 2018-11-23 LAB
ALBUMIN SERPL-MCNC: 2.9 G/DL (ref 3.4–5)
ALP SERPL-CCNC: 111 U/L (ref 45–117)
ALT SERPL-CCNC: 47 U/L (ref 12–78)
ANION GAP SERPL CALC-SCNC: 7 MMOL/L (ref 5–15)
BASOPHILS # BLD AUTO: 0.03 X10^3/UL (ref 0–0.1)
BASOPHILS NFR BLD AUTO: 0 % (ref 0–1)
BILIRUB SERPL-MCNC: 0.2 MG/DL (ref 0.2–1)
CALCIUM SERPL-MCNC: 8.6 MG/DL (ref 8.5–10.1)
CHLORIDE SERPL-SCNC: 107 MMOL/L (ref 98–107)
CREAT SERPL-MCNC: 0.64 MG/DL (ref 0.55–1.02)
CULTURE INDICATED?: NO
EOSINOPHIL # BLD AUTO: 0.09 X10^3/UL (ref 0–0.4)
EOSINOPHIL NFR BLD AUTO: 1 % (ref 1–7)
ERYTHROCYTE [DISTWIDTH] IN BLOOD BY AUTOMATED COUNT: 13.1 % (ref 9.6–15.2)
LYMPHOCYTES # BLD AUTO: 1.31 X10^3/UL (ref 1–3.4)
LYMPHOCYTES NFR BLD AUTO: 13 % (ref 22–44)
MCH RBC QN AUTO: 30.6 PG (ref 27–34.8)
MCHC RBC AUTO-ENTMCNC: 34.6 G/DL (ref 32.4–35.8)
MCV RBC AUTO: 88.6 FL (ref 80–100)
MD: NO
MICROSCOPIC: (no result)
MONOCYTES # BLD AUTO: 0.98 X10^3/UL (ref 0.2–0.8)
MONOCYTES NFR BLD AUTO: 10 % (ref 2–9)
NEUTROPHILS # BLD AUTO: 7.33 X10^3/UL (ref 1.8–6.8)
NEUTROPHILS NFR BLD AUTO: 75 % (ref 42–75)
PLATELET # BLD AUTO: 321 X10^3/UL (ref 130–400)
PMV BLD AUTO: 7.9 FL (ref 7.4–10.4)
PROT SERPL-MCNC: 7 G/DL (ref 6.4–8.2)
RBC # BLD AUTO: 4 X10^6/UL (ref 3.82–5.3)

## 2018-11-23 PROCEDURE — 83605 ASSAY OF LACTIC ACID: CPT

## 2018-11-23 PROCEDURE — 85025 COMPLETE CBC W/AUTO DIFF WBC: CPT

## 2018-11-23 PROCEDURE — 81001 URINALYSIS AUTO W/SCOPE: CPT

## 2018-11-23 PROCEDURE — 99283 EMERGENCY DEPT VISIT LOW MDM: CPT

## 2018-11-23 PROCEDURE — 84145 PROCALCITONIN (PCT): CPT

## 2018-11-23 PROCEDURE — 36415 COLL VENOUS BLD VENIPUNCTURE: CPT

## 2018-11-23 PROCEDURE — 80053 COMPREHEN METABOLIC PANEL: CPT

## 2018-11-23 PROCEDURE — 96374 THER/PROPH/DIAG INJ IV PUSH: CPT

## 2019-02-03 ENCOUNTER — HOSPITAL ENCOUNTER (OUTPATIENT)
Facility: MEDICAL CENTER | Age: 29
End: 2019-02-03
Attending: OBSTETRICS & GYNECOLOGY | Admitting: OBSTETRICS & GYNECOLOGY
Payer: COMMERCIAL

## 2019-02-03 VITALS
RESPIRATION RATE: 16 BRPM | DIASTOLIC BLOOD PRESSURE: 77 MMHG | SYSTOLIC BLOOD PRESSURE: 143 MMHG | HEART RATE: 88 BPM | TEMPERATURE: 98.1 F

## 2019-02-03 LAB
APPEARANCE UR: ABNORMAL
COLOR UR AUTO: ABNORMAL
CRYSTALS AMN MICRO: NORMAL
GLUCOSE UR QL STRIP.AUTO: NEGATIVE MG/DL
KETONES UR QL STRIP.AUTO: NEGATIVE MG/DL
LEUKOCYTE ESTERASE UR QL STRIP.AUTO: NEGATIVE
NITRITE UR QL STRIP.AUTO: NEGATIVE
PH UR STRIP.AUTO: 5.5 [PH]
PROT UR QL STRIP: NEGATIVE MG/DL
RBC UR QL AUTO: ABNORMAL
SP GR UR: 1.01

## 2019-02-03 PROCEDURE — 89060 EXAM SYNOVIAL FLUID CRYSTALS: CPT

## 2019-02-03 PROCEDURE — 59025 FETAL NON-STRESS TEST: CPT

## 2019-02-03 PROCEDURE — 81002 URINALYSIS NONAUTO W/O SCOPE: CPT

## 2019-02-03 NOTE — DISCHARGE INSTRUCTIONS
Pre-term Labor (<37 weeks):  Call your physician or return to the hospital if:  · You have painless regular contractions more than 4 in one hour.  · Your water breaks (remember time and color).  · You have menstrual-like cramps, a low dull backache or pressure in your pelvis or back.  · Your baby does not move enough to complete the daily kick count (10 movements in 2 hours).  · Your baby moves much less often than on the days before or you have not felt your baby move all day.  · Please review the MEDICATION LIST section of your AFTER VISIT SUMMARY document.  · Take your medication as prescribed

## 2019-02-03 NOTE — PROGRESS NOTES
EDC  27      1350-pt presents from home with c/o possibly leaking water, states that she was lying quietly when she noticed her underwear was wet so she got up to void and states that she voided a lot, states that she went to the store and now her pelvis hurts but is not leaking any longer, no c/o bleeding or uc's, states baby is moving normally, placed on external monitors, vs taken, SSE performed, no pooling noted, melquiades slide prepared and sent, SVE closed/thick/high  1440-melquiades back negative, TC Dr Gregg, report given, discharge order received  1445-pt discharged home with PTL precautions, verbalized understanding, left ambulatory with SO

## 2019-03-27 ENCOUNTER — APPOINTMENT (OUTPATIENT)
Dept: RADIOLOGY | Facility: MEDICAL CENTER | Age: 29
End: 2019-03-27
Attending: OBSTETRICS & GYNECOLOGY
Payer: COMMERCIAL

## 2019-03-27 ENCOUNTER — HOSPITAL ENCOUNTER (OUTPATIENT)
Facility: MEDICAL CENTER | Age: 29
End: 2019-03-27
Attending: OBSTETRICS & GYNECOLOGY | Admitting: OBSTETRICS & GYNECOLOGY
Payer: COMMERCIAL

## 2019-03-27 VITALS
HEART RATE: 80 BPM | HEIGHT: 70 IN | TEMPERATURE: 97.9 F | DIASTOLIC BLOOD PRESSURE: 77 MMHG | WEIGHT: 268 LBS | BODY MASS INDEX: 38.37 KG/M2 | SYSTOLIC BLOOD PRESSURE: 133 MMHG

## 2019-03-27 LAB
AMPHET UR QL SCN: NEGATIVE
APPEARANCE UR: CLEAR
BACTERIA #/AREA URNS HPF: ABNORMAL /HPF
BARBITURATES UR QL SCN: NEGATIVE
BENZODIAZ UR QL SCN: NEGATIVE
BILIRUB UR QL STRIP.AUTO: NEGATIVE
BZE UR QL SCN: NEGATIVE
CANNABINOIDS UR QL SCN: NEGATIVE
COLOR UR: YELLOW
EPI CELLS #/AREA URNS HPF: NEGATIVE /HPF
GLUCOSE BLD-MCNC: 77 MG/DL (ref 65–99)
GLUCOSE UR STRIP.AUTO-MCNC: NEGATIVE MG/DL
HYALINE CASTS #/AREA URNS LPF: ABNORMAL /LPF
KETONES UR STRIP.AUTO-MCNC: 40 MG/DL
LEUKOCYTE ESTERASE UR QL STRIP.AUTO: NEGATIVE
METHADONE UR QL SCN: NEGATIVE
MICRO URNS: ABNORMAL
NITRITE UR QL STRIP.AUTO: NEGATIVE
OPIATES UR QL SCN: NEGATIVE
OXYCODONE UR QL SCN: NEGATIVE
PCP UR QL SCN: NEGATIVE
PH UR STRIP.AUTO: 6.5 [PH]
PROPOXYPH UR QL SCN: NEGATIVE
PROT UR QL STRIP: NEGATIVE MG/DL
RBC # URNS HPF: ABNORMAL /HPF
RBC UR QL AUTO: ABNORMAL
SP GR UR STRIP.AUTO: 1.01
UROBILINOGEN UR STRIP.AUTO-MCNC: 0.2 MG/DL
WBC #/AREA URNS HPF: ABNORMAL /HPF

## 2019-03-27 PROCEDURE — 59025 FETAL NON-STRESS TEST: CPT

## 2019-03-27 PROCEDURE — 81001 URINALYSIS AUTO W/SCOPE: CPT

## 2019-03-27 PROCEDURE — 80307 DRUG TEST PRSMV CHEM ANLYZR: CPT

## 2019-03-27 PROCEDURE — 76816 OB US FOLLOW-UP PER FETUS: CPT

## 2019-03-27 PROCEDURE — 76819 FETAL BIOPHYS PROFIL W/O NST: CPT

## 2019-03-27 PROCEDURE — 82962 GLUCOSE BLOOD TEST: CPT

## 2019-03-28 NOTE — PROGRESS NOTES
"Department of Obstetrics and Gynecology  Labor and Delivery Assessment Note    ID: 28 y.o. is a  with IUP at 36w5d     Primary Obstetrician: Corinne E Capurro, M.D.    Assessing Obstetrician: Malachi Nava MD    CC: decel in the office, GDM    HPI: Pt presents after having NST in the office after recent dx of GDM.  Two decels to 90bpm noted.  Sent to the hospital for extended monitoring.  She endorses fetal movement but notes that it was less than usual over the last 3d.  Feels CTX but does not feel that she is in labor. Was checked in the office and found to be 2cm, per pt.  Denies LOF, VB.  Otherwise in usual state of health today.      ROS: 10 systems negative except as noted above.    O: /77   Pulse 80   Temp 36.6 °C (97.9 °F) (Temporal)   Ht 1.778 m (5' 10\")   Wt 121.6 kg (268 lb)   LMP 2018   BMI 38.45 kg/m²    Gen: NAD, AAO  Pulm: no distress  Abd: Gravid, soft, uterus NTTP, no rebound/guarding  Ext: WWP, 2+ DPP, 1+ edema  SVE: 2/20/-3    FHT: 115/mod nat/+accels, -decels, >2h of monitoring  [place]: irregular but some as close as q4-8min    BPP: FINDINGS:  Single intrauterine gestation is identified which has a heart rate of 119 bpm.  Amniotic fluid volume is 15.3 cm.  Cord systolic to diastolic ratios are less than 3.  Biophysical profile score is 8  out of 8 (movement 2, tone 2, breathing 2, fluid 2).  IMPRESSION: Normal biophysical profile ultrasound.    Growth U/S: Placenta (Location):  Anterior  Placenta Previa: No  Placental ndGndrndanddndend:nd nd2nd Amniotic Fluid Volume:  MYRA = 15.3 cm  Fetal Heart Rate:  126 bpm  Cervical Length:  4.07 cm  No maternal adnexal mass is identified.  Fetal Biometry  BPD    8.84 cm, Hadlock 35w 5d  HC    33.00 cm, Hadlock 37w 4d  AC    33.26 cm, Hadlock 37w 1d  Femur Length    7.15 cm, Hadlock 36w 4d  Humerus Length    5.91 cm, Kehinde 34w 2d  EGA by this US:  Average 36w 2d  JAMAR by this US: 2019  JAMAR by 1st US:  Unknown  Estimated Fetal Weight:  3066 " g  IMPRESSION: Single intrauterine pregnancy of an estimated gestational age of Average 36w 2d with an estimated date of delivery of 2019.    A/P: Oseas Day is a 28 y.o.  at 36w5d.  Decelerations in the office.  AVSS.  Reassuring, reactive FHT.  *FHR Decelerations: no evidence of deceleration on extended monitoring.   BPP.  In the absence of any FHT abnormalities and reassuring BPP, feel she is reasonable for outpatient management.    *GDM: Unclear whether A1 or A2.  Random glucose not elevated.  Pt has f/u with nutrition counseling in 2d.      Call the office for f/u with Dr. Beckford (likely early week for f/u NST).    Malachi Nava M.D., 3/27/2019, 8:16 PM

## 2019-03-28 NOTE — PROGRESS NOTES
Report received from WASHINGTON Browne RN. POC discussed. UA collected and sent to lab (see results).     Dr Nava at bedside. Discussed findings and POC. All questions answered at this time.     General discharge instructions, PTL and term labor precautions discussed with pt. Follow up for GDM educational class discussed. All questions answered at this time. PT signed discharge instructions and ambulated out in stable condition with wife at side.

## 2019-03-28 NOTE — PROGRESS NOTES
Patient comes over from Dr Beckford's office for deceleration in the office.  She denies contractions leaking and bleeding.  She has had limited prenatal care.  She feels fetal movement.  Tracing is reactive.  Reviewed by Dr Nava. Pt to be monitored for a couple of hours.  BPP ordered, 8/8.  Dr Nava notified.  Report given to Nay ALDRICH.

## 2019-03-29 ENCOUNTER — NON-PROVIDER VISIT (OUTPATIENT)
Dept: HEALTH INFORMATION MANAGEMENT | Facility: MEDICAL CENTER | Age: 29
End: 2019-03-29
Payer: COMMERCIAL

## 2019-03-29 VITALS — HEIGHT: 70 IN | WEIGHT: 269.3 LBS | BODY MASS INDEX: 38.55 KG/M2

## 2019-03-29 DIAGNOSIS — O24.419 GESTATIONAL DIABETES MELLITUS (GDM) IN THIRD TRIMESTER, GESTATIONAL DIABETES METHOD OF CONTROL UNSPECIFIED: ICD-10-CM

## 2019-03-29 PROCEDURE — G0109 DIAB MANAGE TRN IND/GROUP: HCPCS | Performed by: INTERNAL MEDICINE

## 2019-03-29 NOTE — LETTER
March 29, 2019                   Re: Oseas Day     1990             Corinne E Capurro, M.D.  [address] [address]  [place] [address]      Dear :Capurro, Corinne E, M.D.    On 3/29/2019, your patient Oseas Day, received 1 hours of nutrition training from the Diabetes Center at [facility] for management of her gestational diabetes.  Her EDC is Estimated Date of Delivery: 4/19/19.  We taught the following subjects:    Patient was provided with a 2200 calorie meal plan with 3 meals and 3 snacks.  Meal plan contains 195-210 grams of carbohydrates per day.   Importance of meal planning in diabetes management during pregnancy  Importance of consistent timing of meals and snacks and agreed upon times  Avoidance of simple carbohydrates  Metabolism of food components relating to pregnancy  Identification of foods in food groups  Patient demonstrates adequate ability to utilize meal planning manual for reference  Plan 3 meals and 3 snacks with 90% accuracy  Review basic principles of eating out  Reviewed precautions with artificial sweeteners    Comments:  Oseas agreed to follow the meal plan and to eat at the times agreed upon.  She will check blood glucose 4 times a day and record the values in her log book.      Oseas Day was encouraged to discuss this further with you.    Hopefully this will help in your management of her care.  If we can be of further assistance, please feel free to call.    Thank you for the referral.    Sincerely,  Tisha Trivedi, RD, LD, CDE  [phone]

## 2019-03-29 NOTE — PROGRESS NOTES
Oseas came to the Gestational Diabetes program.  She states her grandmother, aunt, and sister all have Type 2 Diabetes.  She states she was recently at the hospital for decrease fetal movement.  She was supplied an One Touch Verio meter. She was able to do a return demonstration without difficulty. She will keep walking and stay well hydrated with water. Her meal plan is scanned into Media and her Doctor Letters with what was taught can be found under the Letters tab.

## 2019-03-29 NOTE — PROGRESS NOTES
Oseas received a 2200 calorie meal plan (based on 18 kcal/kg of current weight) consisting of 3 meals and 3 snacks (see media for copy of meal plan).   Pt's prepregnancy weight was: 268. She has gained +1.3lb so far in this pregnancy.   Recommended meal times:   B: 7am   S: 10am   L: 1pm   S:  4pm   D: 7pm   S: 10pm    Pt was educated on carbohydrate containing foods vs non carbohydrate containing foods, the importance of small frequent meals, limiting carbohydrate to 1 serving in the morning, no fruit before noon/12pm, and avoiding all concentrated sweets for the remainder of her pregnancy. Explained the importance of not going >4hrs btwn eating during the day and no longer than 10hours overnight. Patient agrees to follow the meal plan and guidelines provided.

## 2019-03-29 NOTE — LETTER
March 29, 2019                   Re: Oseas Day     1990         [id number]       Corinne E Capurro, M.D.  [address] [address]  [place] [address]      Dear :Capurro, Corinne E, M.D.    On 3/29/2019, your patient Oseas Day, received 1 hour of nurse training from the Diabetes Center at [facility] for management of her gestational diabetes.  Her  Estimated Date of Delivery: 4/19/19.  We taught the following subjects:    Introduction to gestational diabetes, benefits and responsibilities of patient, physiology of diabetes and the diease process, benefits of blood glucose monitoring and record keeping, medication action and possible side effects, hypoglycemia, sick day management, exercise, stress reduction and travel with diabetes.       Nurse assessment / Education:    Comments:    Edema:no      Weight:Weight: 122.2 kg (269 lb 4.8 oz)         Complaints:yes - Decreased fetal movement.      Pathophysiology of diabetes in pregnancy    Discuss  potential maternal and fetal complications in pregnancy with diabetes.     Importance of blood glucose monitoring   Proper testing technique using a One Touch Verio Flex meter.    At 2: 20 pm, the meter read 81, which was 6.5 hours after eating.  Testing: fasting and one hour after meals,  expected ranges and rationale for strict control.     Ketone test today:no       Recognition and treatment of hypoglycemia.     Insulin taught: No  Insulin briefly dicussed at this time.    Should patient require insulin later in pregnancy, she would need further education.   Reviewed fetal kick counts and other tests to determine fetal well-being  Discuss benefits and risks of exercise in pregnancy  Discuss when to call Doctor  Discuss sick day care  Importance of wearing diabetes identification      Patient/caregiver appeared to understand the content as demonstrated by appropriate questions.     Oseas Day was encouraged to discuss this further with  you.    Hopefully this will help in your management of her care.  If we can be of further assistance, please feel free to call.    Thank you for the referral.    Sincerely,        Magaly Delgado RN CDE  GDM CLASS  Certified Diabetes Educator

## 2019-04-04 ENCOUNTER — HOSPITAL ENCOUNTER (OUTPATIENT)
Facility: MEDICAL CENTER | Age: 29
End: 2019-04-04
Attending: OBSTETRICS & GYNECOLOGY | Admitting: OBSTETRICS & GYNECOLOGY

## 2019-04-04 VITALS
TEMPERATURE: 97.6 F | BODY MASS INDEX: 38.37 KG/M2 | SYSTOLIC BLOOD PRESSURE: 126 MMHG | DIASTOLIC BLOOD PRESSURE: 67 MMHG | HEIGHT: 70 IN | WEIGHT: 268 LBS | HEART RATE: 70 BPM

## 2019-04-04 LAB
APPEARANCE UR: CLEAR
COLOR UR AUTO: YELLOW
CRYSTALS AMN MICRO: NORMAL
GLUCOSE BLD-MCNC: 89 MG/DL (ref 65–99)
GLUCOSE UR QL STRIP.AUTO: NEGATIVE MG/DL
KETONES UR QL STRIP.AUTO: NEGATIVE MG/DL
LEUKOCYTE ESTERASE UR QL STRIP.AUTO: NEGATIVE
NITRITE UR QL STRIP.AUTO: NEGATIVE
PH UR STRIP.AUTO: 7 [PH]
PROT UR QL STRIP: 30 MG/DL
RBC UR QL AUTO: ABNORMAL
SP GR UR: 1.02

## 2019-04-04 PROCEDURE — 700111 HCHG RX REV CODE 636 W/ 250 OVERRIDE (IP)

## 2019-04-04 PROCEDURE — 81002 URINALYSIS NONAUTO W/O SCOPE: CPT

## 2019-04-04 PROCEDURE — 87086 URINE CULTURE/COLONY COUNT: CPT

## 2019-04-04 PROCEDURE — 96374 THER/PROPH/DIAG INJ IV PUSH: CPT

## 2019-04-04 PROCEDURE — 82962 GLUCOSE BLOOD TEST: CPT

## 2019-04-04 PROCEDURE — 700105 HCHG RX REV CODE 258: Performed by: OBSTETRICS & GYNECOLOGY

## 2019-04-04 PROCEDURE — 59025 FETAL NON-STRESS TEST: CPT

## 2019-04-04 PROCEDURE — 89060 EXAM SYNOVIAL FLUID CRYSTALS: CPT

## 2019-04-04 RX ORDER — SODIUM CHLORIDE, SODIUM LACTATE, POTASSIUM CHLORIDE, CALCIUM CHLORIDE 600; 310; 30; 20 MG/100ML; MG/100ML; MG/100ML; MG/100ML
1000 INJECTION, SOLUTION INTRAVENOUS ONCE
Status: COMPLETED | OUTPATIENT
Start: 2019-04-04 | End: 2019-04-04

## 2019-04-04 RX ORDER — ONDANSETRON 2 MG/ML
INJECTION INTRAMUSCULAR; INTRAVENOUS
Status: COMPLETED
Start: 2019-04-04 | End: 2019-04-04

## 2019-04-04 RX ORDER — ONDANSETRON 2 MG/ML
4 INJECTION INTRAMUSCULAR; INTRAVENOUS EVERY 4 HOURS PRN
Status: DISCONTINUED | OUTPATIENT
Start: 2019-04-04 | End: 2019-04-04 | Stop reason: HOSPADM

## 2019-04-04 RX ADMIN — ONDANSETRON 4 MG: 2 INJECTION INTRAMUSCULAR; INTRAVENOUS at 14:29

## 2019-04-04 RX ADMIN — SODIUM CHLORIDE, POTASSIUM CHLORIDE, SODIUM LACTATE AND CALCIUM CHLORIDE 1000 ML: 600; 310; 30; 20 INJECTION, SOLUTION INTRAVENOUS at 14:45

## 2019-04-04 NOTE — PROGRESS NOTES
"Department of Obstetrics and Gynecology  Labor and Delivery Assessment Note    ID: 28 y.o. is a  with IUP at 37.6 weeks    Primary Obstetrician: Corinne E Capurro, M.D.    Assessing Obstetrician: Jeannine Hatfield MD    CC: Nausea, vomiting, diarrhea, back pain, decreased fetal movement, leaking fluids    HPI: Pt presents to OB triage w/ multiple complaints. 2 days of N/V/D after eating at [employer]. Another person had N/V/D after eating there but it only lasted a few hours. Pt states unable to hold down food/liquids. 1 episode of diarrhea today this morning but none since. No fever/chills. Has burning, achy pain across her back. Baby has not moved normally in the past 3 days. Leaking clear fluid last night, unsure of it is amniotic fluid. No vaginal bleeding. Reports a FBS of 108 this morning.     O: /67   Pulse 70   Temp 36.4 °C (97.6 °F) (Temporal)   Ht 1.778 m (5' 10\")   Wt 121.6 kg (268 lb)   LMP 2018   BMI 38.45 kg/m²    Gen: NAD, AAO  Pulm: no distress  Abd: Gravid, soft, uterus NTTP, no rebound/guarding  Back: no CVA tenderness  Ext: nontender bilaterally, no cyanosis or clubbing, 2+ DPP, no edema    Accu check 89  Urine dipstick 1+ protein, trace lysed blood, 1.025, clear/yellow, neg nitrates  Fern negative    FHT: 110, pos accels, neg decels, moderate variability, cat 1 FHR, reactive NST  [place]: rare contractions  SVE per RN: 2/50/-2 (unchanged per exam in office)    A/P: Oseas Day is a 28 y.o.  at 37.6 weeks.    N/V/D, dehydration - improved s/p zofran and IVF, tolerating PO challenge, pt has rx at home for zofran and phenergan  Back pain - likely normal discomfort of pregnancy, urine dipstick normal, UA sent for culture  No evidence of ruptured membranes  AVSS.  Reassuring, reactive FHT.  Pt will be discharged home  F/U with Dr. Capurro this week  Fetal kick counts, labor precautions educated  Increase PO hydration  Warm baths, heating pad, tylenol, massage " prn  Imodium prn diarrhea  Return prn concerns      Jeannine Hatfield, 4/4/2019, 3:54 PM

## 2019-04-04 NOTE — PROGRESS NOTES
28 y.o. , EDC =37w6d     Pt presents to L&D with multiple c/o. Reports n/v/d for two days, has not been able to keep anything down. Unable to provide urine sample, water given. Pt also reports burning pain in R/L back, radiating to lower abdomen. Reports hx of kidney infections, not sure if this feels similar. Pt reports decreased FM for last three days. Pt also reports leaking of clear fluid since last night, not sure if it is her water. External monitors applied, temp 97.6, denies fever/chills. SSE shows no pooling, fern collected and to lab. SVE /-2, same as in office. Urine collected and dipped. Pt has hx GDMA1, reports fasting sugar this am was 108    Call to Dr. Lee, report given. Orders for IV hydration, IV Zofran, urine culture and blood sugar    BG 89, urine to lab, fern negative     At bedside at 1500, audible FM, pt reports she has felt the baby move more since arrival. Also reports feeling very anxious this pregnancy due to hx SAB x 6. Pt feeling reassured being assessed     To bedside, pt reports feeling less nauseous after LR and Zofran, able to tolerate ginger ale and saltines. Pt continues to report pain in back and belly, has tried Tylenol at home, does not wish to use narcotics due to addiction hx. Dr. Lee updated on pt status including FHT tracing, will come to bedside     Dr. Lee to bedside, reviewed FHT tracing, d/c order received. D/c instructions discussed with pt including using warm bath and Tylenol for pain relief and importance of IV hydration. Pt has Rx for Zofran at home.  precautions discussed as well as reasons to return to L&D. Pt to call and follow up with Dr. Beckford. Expresses understanding. Ambulatory off unit with pt's wife

## 2019-04-06 LAB
BACTERIA UR CULT: NORMAL
SIGNIFICANT IND 70042: NORMAL
SITE SITE: NORMAL
SOURCE SOURCE: NORMAL

## 2019-04-09 NOTE — DISCHARGE INSTRUCTIONS
General Instructions:  · If you think you are in labor, time contractions (lying on your left side) from the beginning of one contraction to the beginning of the next contraction for at least one hour.  · Increase fluid intake: you should consume 10-12 8 oz glasses of non-caffeinated fluid per day.  · Report any pressure or burning on urination to your physician.  · Monitor fetal movement: If you notice an absence or decrease in fetal movement, drink a large glass of water and rest on your side.  If there is no increase in movement, call your physician or go to the hospital for further evaluation.  · Report any sudden, sharp abdominal pain.  · Report any bleeding.  Spotting or pinkish discharge is normal after vaginal exam.  You may also spot after sexual intercourse.    Pre-term Labor (<37 weeks):  Call your physician or return to the hospital if:  You have painless regular contractions more than 4 in one hour.  Your water breaks (remember time and color).  You have menstrual-like cramps, a low dull backache or pressure in your pelvis or back.  Your baby does not move enough to complete the daily kick count (10 movements in 2 hours).  Your baby moves much less often than on the days before or you have not felt your baby move all day.  Please review the MEDICATION LIST section of your AFTER VISIT SUMMARY document.  Take your medication as prescribed    Labor Instructions (37 - 39 weeks):  Call your physician or return to hospital if:  · You have regular contractions that get progressively closer, longer and stronger.  · Your water breaks (remember time and color).  · You have bleeding like a period.  · Your baby does not move enough to complete the daily kick counts (10 movements in 2 hours)  · Your baby moves much less often than on the days before or you have not felt your baby move all day.      Other Instructions:  Please carefully review your entire AFTER VISIT SUMMARY document for all discharge instructions.  
No

## 2019-04-12 ENCOUNTER — HOSPITAL ENCOUNTER (INPATIENT)
Dept: HOSPITAL 8 - LDIP | Age: 29
LOS: 2 days | Discharge: HOME | End: 2019-04-14
Attending: OBSTETRICS & GYNECOLOGY | Admitting: OBSTETRICS & GYNECOLOGY
Payer: COMMERCIAL

## 2019-04-12 VITALS — SYSTOLIC BLOOD PRESSURE: 121 MMHG | DIASTOLIC BLOOD PRESSURE: 62 MMHG

## 2019-04-12 VITALS — WEIGHT: 271.39 LBS | HEIGHT: 68 IN | BODY MASS INDEX: 41.13 KG/M2

## 2019-04-12 DIAGNOSIS — Z3A.39: ICD-10-CM

## 2019-04-12 DIAGNOSIS — S30.814A: ICD-10-CM

## 2019-04-12 LAB
BASOPHILS # BLD AUTO: 0.03 X10^3/UL (ref 0–0.1)
BASOPHILS NFR BLD AUTO: 0 % (ref 0–1)
EOSINOPHIL # BLD AUTO: 0.16 X10^3/UL (ref 0–0.4)
EOSINOPHIL NFR BLD AUTO: 2 % (ref 1–7)
ERYTHROCYTE [DISTWIDTH] IN BLOOD BY AUTOMATED COUNT: 13.6 % (ref 9.6–15.2)
LYMPHOCYTES # BLD AUTO: 1.96 X10^3/UL (ref 1–3.4)
LYMPHOCYTES NFR BLD AUTO: 18 % (ref 22–44)
MCH RBC QN AUTO: 29.9 PG (ref 27–34.8)
MCHC RBC AUTO-ENTMCNC: 33.7 G/DL (ref 32.4–35.8)
MCV RBC AUTO: 88.9 FL (ref 80–100)
MD: NO
MICROSCOPIC: (no result)
MONOCYTES # BLD AUTO: 0.89 X10^3/UL (ref 0.2–0.8)
MONOCYTES NFR BLD AUTO: 8 % (ref 2–9)
NEUTROPHILS # BLD AUTO: 7.87 X10^3/UL (ref 1.8–6.8)
NEUTROPHILS NFR BLD AUTO: 72 % (ref 42–75)
PLATELET # BLD AUTO: 378 X10^3/UL (ref 130–400)
PMV BLD AUTO: 8.1 FL (ref 7.4–10.4)
RBC # BLD AUTO: 4.21 X10^6/UL (ref 3.82–5.3)

## 2019-04-12 PROCEDURE — 80307 DRUG TEST PRSMV CHEM ANLYZR: CPT

## 2019-04-12 PROCEDURE — 82962 GLUCOSE BLOOD TEST: CPT

## 2019-04-12 PROCEDURE — 85025 COMPLETE CBC W/AUTO DIFF WBC: CPT

## 2019-04-12 PROCEDURE — 90715 TDAP VACCINE 7 YRS/> IM: CPT

## 2019-04-12 PROCEDURE — 36415 COLL VENOUS BLD VENIPUNCTURE: CPT

## 2019-04-12 PROCEDURE — 81001 URINALYSIS AUTO W/SCOPE: CPT

## 2019-04-12 PROCEDURE — 86850 RBC ANTIBODY SCREEN: CPT

## 2019-04-12 PROCEDURE — 86900 BLOOD TYPING SEROLOGIC ABO: CPT

## 2019-04-12 RX ADMIN — SODIUM CHLORIDE, SODIUM LACTATE, POTASSIUM CHLORIDE, AND CALCIUM CHLORIDE SCH MLS/HR: .6; .31; .03; .02 INJECTION, SOLUTION INTRAVENOUS at 11:35

## 2019-04-12 RX ADMIN — SODIUM CHLORIDE, SODIUM LACTATE, POTASSIUM CHLORIDE, AND CALCIUM CHLORIDE SCH MLS/HR: .6; .31; .03; .02 INJECTION, SOLUTION INTRAVENOUS at 17:52

## 2019-04-12 RX ADMIN — ONDANSETRON PRN MG: 2 INJECTION, SOLUTION INTRAMUSCULAR; INTRAVENOUS at 18:50

## 2019-04-12 RX ADMIN — SODIUM CHLORIDE, SODIUM LACTATE, POTASSIUM CHLORIDE, AND CALCIUM CHLORIDE SCH MLS/HR: .6; .31; .03; .02 INJECTION, SOLUTION INTRAVENOUS at 16:54

## 2019-04-13 VITALS — SYSTOLIC BLOOD PRESSURE: 123 MMHG | DIASTOLIC BLOOD PRESSURE: 78 MMHG

## 2019-04-13 VITALS — SYSTOLIC BLOOD PRESSURE: 137 MMHG | DIASTOLIC BLOOD PRESSURE: 81 MMHG

## 2019-04-13 VITALS — DIASTOLIC BLOOD PRESSURE: 84 MMHG | SYSTOLIC BLOOD PRESSURE: 125 MMHG

## 2019-04-13 VITALS — SYSTOLIC BLOOD PRESSURE: 117 MMHG | DIASTOLIC BLOOD PRESSURE: 71 MMHG

## 2019-04-13 LAB
BASOPHILS # BLD AUTO: 0.06 X10^3/UL (ref 0–0.1)
BASOPHILS NFR BLD AUTO: 0 % (ref 0–1)
EOSINOPHIL # BLD AUTO: 0.11 X10^3/UL (ref 0–0.4)
EOSINOPHIL NFR BLD AUTO: 1 % (ref 1–7)
ERYTHROCYTE [DISTWIDTH] IN BLOOD BY AUTOMATED COUNT: 13.5 % (ref 9.6–15.2)
LYMPHOCYTES # BLD AUTO: 1.94 X10^3/UL (ref 1–3.4)
LYMPHOCYTES NFR BLD AUTO: 12 % (ref 22–44)
MCH RBC QN AUTO: 30.3 PG (ref 27–34.8)
MCHC RBC AUTO-ENTMCNC: 34 G/DL (ref 32.4–35.8)
MCV RBC AUTO: 88.9 FL (ref 80–100)
MD: NO
MONOCYTES # BLD AUTO: 1.29 X10^3/UL (ref 0.2–0.8)
MONOCYTES NFR BLD AUTO: 8 % (ref 2–9)
NEUTROPHILS # BLD AUTO: 12.36 X10^3/UL (ref 1.8–6.8)
NEUTROPHILS NFR BLD AUTO: 78 % (ref 42–75)
PLATELET # BLD AUTO: 313 X10^3/UL (ref 130–400)
PMV BLD AUTO: 8 FL (ref 7.4–10.4)
RBC # BLD AUTO: 3.57 X10^6/UL (ref 3.82–5.3)

## 2019-04-13 PROCEDURE — 3E0R3BZ INTRODUCTION OF ANESTHETIC AGENT INTO SPINAL CANAL, PERCUTANEOUS APPROACH: ICD-10-PCS | Performed by: OBSTETRICS & GYNECOLOGY

## 2019-04-13 PROCEDURE — 3E033VJ INTRODUCTION OF OTHER HORMONE INTO PERIPHERAL VEIN, PERCUTANEOUS APPROACH: ICD-10-PCS | Performed by: OBSTETRICS & GYNECOLOGY

## 2019-04-13 PROCEDURE — 00HU33Z INSERTION OF INFUSION DEVICE INTO SPINAL CANAL, PERCUTANEOUS APPROACH: ICD-10-PCS | Performed by: OBSTETRICS & GYNECOLOGY

## 2019-04-13 PROCEDURE — 10907ZC DRAINAGE OF AMNIOTIC FLUID, THERAPEUTIC FROM PRODUCTS OF CONCEPTION, VIA NATURAL OR ARTIFICIAL OPENING: ICD-10-PCS | Performed by: OBSTETRICS & GYNECOLOGY

## 2019-04-13 PROCEDURE — 0UQGXZZ REPAIR VAGINA, EXTERNAL APPROACH: ICD-10-PCS | Performed by: OBSTETRICS & GYNECOLOGY

## 2019-04-13 RX ADMIN — PRENATAL VIT W/ FE FUMARATE-FA TAB 27-0.8 MG SCH EACH: 27-0.8 TAB at 09:00

## 2019-04-13 RX ADMIN — DOCUSATE SODIUM PRN MG: 100 CAPSULE, LIQUID FILLED ORAL at 19:59

## 2019-04-13 RX ADMIN — ONDANSETRON PRN MG: 2 INJECTION, SOLUTION INTRAMUSCULAR; INTRAVENOUS at 04:41

## 2019-04-13 RX ADMIN — OXYCODONE HYDROCHLORIDE AND ACETAMINOPHEN PRN TAB: 5; 325 TABLET ORAL at 22:25

## 2019-04-13 RX ADMIN — IBUPROFEN PRN MG: 600 TABLET ORAL at 19:59

## 2019-04-13 RX ADMIN — Medication SCH MLS/HR: at 15:05

## 2019-04-13 RX ADMIN — Medication SCH MLS/HR: at 06:07

## 2019-04-14 VITALS — SYSTOLIC BLOOD PRESSURE: 112 MMHG | DIASTOLIC BLOOD PRESSURE: 75 MMHG

## 2019-04-14 VITALS — SYSTOLIC BLOOD PRESSURE: 113 MMHG | DIASTOLIC BLOOD PRESSURE: 74 MMHG

## 2019-04-14 RX ADMIN — IBUPROFEN PRN MG: 600 TABLET ORAL at 06:33

## 2019-04-14 RX ADMIN — IBUPROFEN PRN MG: 600 TABLET ORAL at 15:41

## 2019-04-14 RX ADMIN — PRENATAL VIT W/ FE FUMARATE-FA TAB 27-0.8 MG SCH EACH: 27-0.8 TAB at 08:29

## 2019-04-14 RX ADMIN — OXYCODONE HYDROCHLORIDE AND ACETAMINOPHEN PRN TAB: 5; 325 TABLET ORAL at 15:41

## 2019-04-14 RX ADMIN — DOCUSATE SODIUM PRN MG: 100 CAPSULE, LIQUID FILLED ORAL at 08:29

## 2019-04-14 RX ADMIN — OXYCODONE HYDROCHLORIDE AND ACETAMINOPHEN PRN TAB: 5; 325 TABLET ORAL at 06:33

## 2019-04-14 RX ADMIN — Medication SCH MLS/HR: at 01:05

## 2019-05-01 ENCOUNTER — HOSPITAL ENCOUNTER (EMERGENCY)
Dept: HOSPITAL 8 - ED | Age: 29
Discharge: HOME | End: 2019-05-01
Payer: COMMERCIAL

## 2019-05-01 VITALS — WEIGHT: 248.02 LBS | BODY MASS INDEX: 35.51 KG/M2 | HEIGHT: 70 IN

## 2019-05-01 VITALS — DIASTOLIC BLOOD PRESSURE: 72 MMHG | SYSTOLIC BLOOD PRESSURE: 112 MMHG

## 2019-05-01 DIAGNOSIS — Z87.891: ICD-10-CM

## 2019-05-01 DIAGNOSIS — N30.00: Primary | ICD-10-CM

## 2019-05-01 LAB
ALBUMIN SERPL-MCNC: 3.6 G/DL (ref 3.4–5)
ALP SERPL-CCNC: 140 U/L (ref 45–117)
ALT SERPL-CCNC: 54 U/L (ref 12–78)
ANION GAP SERPL CALC-SCNC: 7 MMOL/L (ref 5–15)
BASOPHILS # BLD AUTO: 0.07 X10^3/UL (ref 0–0.1)
BASOPHILS NFR BLD AUTO: 1 % (ref 0–1)
BILIRUB SERPL-MCNC: 0.5 MG/DL (ref 0.2–1)
CALCIUM SERPL-MCNC: 8.8 MG/DL (ref 8.5–10.1)
CHLORIDE SERPL-SCNC: 109 MMOL/L (ref 98–107)
CREAT SERPL-MCNC: 0.94 MG/DL (ref 0.55–1.02)
CULTURE INDICATED?: YES
EOSINOPHIL # BLD AUTO: 0.47 X10^3/UL (ref 0–0.4)
EOSINOPHIL NFR BLD AUTO: 5 % (ref 1–7)
ERYTHROCYTE [DISTWIDTH] IN BLOOD BY AUTOMATED COUNT: 13.5 % (ref 9.6–15.2)
LYMPHOCYTES # BLD AUTO: 2.52 X10^3/UL (ref 1–3.4)
LYMPHOCYTES NFR BLD AUTO: 24 % (ref 22–44)
MCH RBC QN AUTO: 29.5 PG (ref 27–34.8)
MCHC RBC AUTO-ENTMCNC: 33.3 G/DL (ref 32.4–35.8)
MCV RBC AUTO: 88.6 FL (ref 80–100)
MD: NO
MICROSCOPIC: (no result)
MONOCYTES # BLD AUTO: 0.68 X10^3/UL (ref 0.2–0.8)
MONOCYTES NFR BLD AUTO: 7 % (ref 2–9)
NEUTROPHILS # BLD AUTO: 6.59 X10^3/UL (ref 1.8–6.8)
NEUTROPHILS NFR BLD AUTO: 64 % (ref 42–75)
PLATELET # BLD AUTO: 477 X10^3/UL (ref 130–400)
PMV BLD AUTO: 8.6 FL (ref 7.4–10.4)
PROT SERPL-MCNC: 7.8 G/DL (ref 6.4–8.2)
RBC # BLD AUTO: 4.78 X10^6/UL (ref 3.82–5.3)

## 2019-05-01 PROCEDURE — 81001 URINALYSIS AUTO W/SCOPE: CPT

## 2019-05-01 PROCEDURE — 96374 THER/PROPH/DIAG INJ IV PUSH: CPT

## 2019-05-01 PROCEDURE — 74177 CT ABD & PELVIS W/CONTRAST: CPT

## 2019-05-01 PROCEDURE — 87086 URINE CULTURE/COLONY COUNT: CPT

## 2019-05-01 PROCEDURE — 85025 COMPLETE CBC W/AUTO DIFF WBC: CPT

## 2019-05-01 PROCEDURE — 36415 COLL VENOUS BLD VENIPUNCTURE: CPT

## 2019-05-01 PROCEDURE — 99284 EMERGENCY DEPT VISIT MOD MDM: CPT

## 2019-05-01 PROCEDURE — 80053 COMPREHEN METABOLIC PANEL: CPT

## 2020-10-24 ENCOUNTER — HOSPITAL ENCOUNTER (EMERGENCY)
Dept: HOSPITAL 8 - ED | Age: 30
Discharge: LEFT BEFORE DISCHARGE | End: 2020-10-24
Payer: MEDICAID

## 2020-10-24 VITALS — DIASTOLIC BLOOD PRESSURE: 82 MMHG | SYSTOLIC BLOOD PRESSURE: 171 MMHG

## 2020-10-24 VITALS — BODY MASS INDEX: 34.72 KG/M2 | WEIGHT: 242.51 LBS | HEIGHT: 70 IN

## 2020-10-24 DIAGNOSIS — R05: ICD-10-CM

## 2020-10-24 DIAGNOSIS — B34.9: Primary | ICD-10-CM

## 2020-10-24 DIAGNOSIS — Z86.718: ICD-10-CM

## 2020-10-24 DIAGNOSIS — R09.81: ICD-10-CM

## 2020-10-24 DIAGNOSIS — F17.200: ICD-10-CM

## 2020-10-24 DIAGNOSIS — Z90.49: ICD-10-CM

## 2020-10-24 DIAGNOSIS — R50.9: ICD-10-CM

## 2020-10-24 PROCEDURE — 36415 COLL VENOUS BLD VENIPUNCTURE: CPT

## 2020-10-24 PROCEDURE — 99283 EMERGENCY DEPT VISIT LOW MDM: CPT

## 2020-10-24 PROCEDURE — 99281 EMR DPT VST MAYX REQ PHY/QHP: CPT

## 2020-10-24 PROCEDURE — 87635 SARS-COV-2 COVID-19 AMP PRB: CPT

## 2021-08-10 ENCOUNTER — HOSPITAL ENCOUNTER (OUTPATIENT)
Dept: HOSPITAL 8 - STAR | Age: 31
Discharge: HOME | End: 2021-08-10
Attending: SURGERY
Payer: MEDICAID

## 2021-08-10 DIAGNOSIS — Z20.822: Primary | ICD-10-CM

## 2021-08-10 PROCEDURE — U0003 INFECTIOUS AGENT DETECTION BY NUCLEIC ACID (DNA OR RNA); SEVERE ACUTE RESPIRATORY SYNDROME CORONAVIRUS 2 (SARS-COV-2) (CORONAVIRUS DISEASE [COVID-19]), AMPLIFIED PROBE TECHNIQUE, MAKING USE OF HIGH THROUGHPUT TECHNOLOGIES AS DESCRIBED BY CMS-2020-01-R: HCPCS

## 2021-08-16 ENCOUNTER — HOSPITAL ENCOUNTER (OUTPATIENT)
Dept: HOSPITAL 8 - OUT | Age: 31
Discharge: HOME | End: 2021-08-16
Attending: SURGERY
Payer: MEDICAID

## 2021-08-16 VITALS — BODY MASS INDEX: 37.05 KG/M2 | HEIGHT: 70 IN | WEIGHT: 258.82 LBS

## 2021-08-16 VITALS — SYSTOLIC BLOOD PRESSURE: 138 MMHG | DIASTOLIC BLOOD PRESSURE: 86 MMHG

## 2021-08-16 DIAGNOSIS — N60.32: ICD-10-CM

## 2021-08-16 DIAGNOSIS — D24.2: ICD-10-CM

## 2021-08-16 DIAGNOSIS — E66.9: ICD-10-CM

## 2021-08-16 DIAGNOSIS — Z91.013: ICD-10-CM

## 2021-08-16 DIAGNOSIS — Z80.3: ICD-10-CM

## 2021-08-16 DIAGNOSIS — N64.52: Primary | ICD-10-CM

## 2021-08-16 DIAGNOSIS — J45.909: ICD-10-CM

## 2021-08-16 LAB — HCG UR SG: 1.02 (ref 1–1.03)

## 2021-08-16 PROCEDURE — 76098 X-RAY EXAM SURGICAL SPECIMEN: CPT

## 2021-08-16 PROCEDURE — 88307 TISSUE EXAM BY PATHOLOGIST: CPT

## 2021-08-16 PROCEDURE — 81025 URINE PREGNANCY TEST: CPT

## 2021-08-16 PROCEDURE — 88305 TISSUE EXAM BY PATHOLOGIST: CPT

## 2021-08-16 PROCEDURE — 19120 REMOVAL OF BREAST LESION: CPT

## 2021-08-16 PROCEDURE — 19125 EXCISION BREAST LESION: CPT

## 2022-08-08 ENCOUNTER — APPOINTMENT (OUTPATIENT)
Dept: RADIOLOGY | Facility: MEDICAL CENTER | Age: 32
End: 2022-08-08
Attending: EMERGENCY MEDICINE
Payer: COMMERCIAL

## 2022-08-08 ENCOUNTER — HOSPITAL ENCOUNTER (EMERGENCY)
Facility: MEDICAL CENTER | Age: 32
End: 2022-08-08
Attending: EMERGENCY MEDICINE
Payer: COMMERCIAL

## 2022-08-08 VITALS
TEMPERATURE: 97.3 F | BODY MASS INDEX: 34.56 KG/M2 | HEIGHT: 70 IN | HEART RATE: 78 BPM | SYSTOLIC BLOOD PRESSURE: 120 MMHG | WEIGHT: 241.4 LBS | DIASTOLIC BLOOD PRESSURE: 67 MMHG | OXYGEN SATURATION: 98 % | RESPIRATION RATE: 18 BRPM

## 2022-08-08 DIAGNOSIS — J06.9 UPPER RESPIRATORY TRACT INFECTION, UNSPECIFIED TYPE: ICD-10-CM

## 2022-08-08 LAB
ALBUMIN SERPL BCP-MCNC: 4.7 G/DL (ref 3.2–4.9)
ALBUMIN/GLOB SERPL: 1.4 G/DL
ALP SERPL-CCNC: 117 U/L (ref 30–99)
ALT SERPL-CCNC: 52 U/L (ref 2–50)
ANION GAP SERPL CALC-SCNC: 13 MMOL/L (ref 7–16)
AST SERPL-CCNC: 23 U/L (ref 12–45)
BASOPHILS # BLD AUTO: 0.7 % (ref 0–1.8)
BASOPHILS # BLD: 0.09 K/UL (ref 0–0.12)
BILIRUB SERPL-MCNC: 0.2 MG/DL (ref 0.1–1.5)
BUN SERPL-MCNC: 10 MG/DL (ref 8–22)
CALCIUM SERPL-MCNC: 9.8 MG/DL (ref 8.4–10.2)
CHLORIDE SERPL-SCNC: 103 MMOL/L (ref 96–112)
CO2 SERPL-SCNC: 23 MMOL/L (ref 20–33)
CREAT SERPL-MCNC: 0.99 MG/DL (ref 0.5–1.4)
D DIMER PPP IA.FEU-MCNC: 0.7 UG/ML (FEU) (ref 0–0.5)
EKG IMPRESSION: NORMAL
EOSINOPHIL # BLD AUTO: 0.23 K/UL (ref 0–0.51)
EOSINOPHIL NFR BLD: 1.8 % (ref 0–6.9)
ERYTHROCYTE [DISTWIDTH] IN BLOOD BY AUTOMATED COUNT: 41.4 FL (ref 35.9–50)
FLUAV RNA SPEC QL NAA+PROBE: NEGATIVE
FLUBV RNA SPEC QL NAA+PROBE: NEGATIVE
GFR SERPLBLD CREATININE-BSD FMLA CKD-EPI: 77 ML/MIN/1.73 M 2
GLOBULIN SER CALC-MCNC: 3.4 G/DL (ref 1.9–3.5)
GLUCOSE SERPL-MCNC: 99 MG/DL (ref 65–99)
HCG SERPL QL: NEGATIVE
HCT VFR BLD AUTO: 46 % (ref 37–47)
HGB BLD-MCNC: 15.4 G/DL (ref 12–16)
IMM GRANULOCYTES # BLD AUTO: 0.06 K/UL (ref 0–0.11)
IMM GRANULOCYTES NFR BLD AUTO: 0.5 % (ref 0–0.9)
LYMPHOCYTES # BLD AUTO: 2.61 K/UL (ref 1–4.8)
LYMPHOCYTES NFR BLD: 20.2 % (ref 22–41)
MCH RBC QN AUTO: 30 PG (ref 27–33)
MCHC RBC AUTO-ENTMCNC: 33.5 G/DL (ref 33.6–35)
MCV RBC AUTO: 89.7 FL (ref 81.4–97.8)
MONOCYTES # BLD AUTO: 0.85 K/UL (ref 0–0.85)
MONOCYTES NFR BLD AUTO: 6.6 % (ref 0–13.4)
NEUTROPHILS # BLD AUTO: 9.07 K/UL (ref 2–7.15)
NEUTROPHILS NFR BLD: 70.2 % (ref 44–72)
NRBC # BLD AUTO: 0 K/UL
NRBC BLD-RTO: 0 /100 WBC
PLATELET # BLD AUTO: 435 K/UL (ref 164–446)
PMV BLD AUTO: 9.6 FL (ref 9–12.9)
POTASSIUM SERPL-SCNC: 3.6 MMOL/L (ref 3.6–5.5)
PROT SERPL-MCNC: 8.1 G/DL (ref 6–8.2)
RBC # BLD AUTO: 5.13 M/UL (ref 4.2–5.4)
RSV RNA SPEC QL NAA+PROBE: NEGATIVE
SARS-COV-2 RNA RESP QL NAA+PROBE: NOTDETECTED
SODIUM SERPL-SCNC: 139 MMOL/L (ref 135–145)
SPECIMEN SOURCE: NORMAL
TROPONIN T SERPL-MCNC: <6 NG/L (ref 6–19)
WBC # BLD AUTO: 12.9 K/UL (ref 4.8–10.8)

## 2022-08-08 PROCEDURE — 85379 FIBRIN DEGRADATION QUANT: CPT

## 2022-08-08 PROCEDURE — C9803 HOPD COVID-19 SPEC COLLECT: HCPCS | Performed by: EMERGENCY MEDICINE

## 2022-08-08 PROCEDURE — 84703 CHORIONIC GONADOTROPIN ASSAY: CPT

## 2022-08-08 PROCEDURE — 71275 CT ANGIOGRAPHY CHEST: CPT

## 2022-08-08 PROCEDURE — 99284 EMERGENCY DEPT VISIT MOD MDM: CPT

## 2022-08-08 PROCEDURE — 71046 X-RAY EXAM CHEST 2 VIEWS: CPT

## 2022-08-08 PROCEDURE — 36415 COLL VENOUS BLD VENIPUNCTURE: CPT

## 2022-08-08 PROCEDURE — 84484 ASSAY OF TROPONIN QUANT: CPT

## 2022-08-08 PROCEDURE — 80053 COMPREHEN METABOLIC PANEL: CPT

## 2022-08-08 PROCEDURE — 93005 ELECTROCARDIOGRAM TRACING: CPT

## 2022-08-08 PROCEDURE — 93005 ELECTROCARDIOGRAM TRACING: CPT | Performed by: EMERGENCY MEDICINE

## 2022-08-08 PROCEDURE — 85025 COMPLETE CBC W/AUTO DIFF WBC: CPT

## 2022-08-08 PROCEDURE — 0241U HCHG SARS-COV-2 COVID-19 NFCT DS RESP RNA 4 TRGT MIC: CPT

## 2022-08-08 PROCEDURE — 700117 HCHG RX CONTRAST REV CODE 255: Performed by: EMERGENCY MEDICINE

## 2022-08-08 RX ADMIN — IOHEXOL 70 ML: 350 INJECTION, SOLUTION INTRAVENOUS at 13:45

## 2022-08-08 NOTE — ED TRIAGE NOTES
Pt amb to triage c/o cp x3days, recent hx sinus infection viral. Pt has hx asthma. EKG compl in triage

## 2022-08-08 NOTE — ED NOTES
D/c pt home,no  rx given . Pt aware of f/u instructions with PMD , aware to return for any changes or concerns. No further questions upon d/c home from ed

## 2022-08-08 NOTE — ED PROVIDER NOTES
ED Provider Note    CHIEF COMPLAINT  Chief Complaint   Patient presents with   • Chest Pain       HPI  Oseas Day is a 31 y.o. female who presents with ongoing illness.  Patient started getting sick about a week ago.  Went to [facility].  Had an evaluation including negative COVID testing and x-ray.  Was diagnosed with a viral illness.  She initially had a lot of nasal congestion and some urinary discomfort.  Urinary symptoms and sinus congestion  resolved but she has persistent cough.  This productive of mucus and even rust colored blood.  She has chest pain when she breathes and when she coughs.  This is generalized and sharp.  She feels short of breath generally weak and tired.  He feels like her hands and feet of been swollen.  No lateral swelling.  No OCP.  No travel immobilization surgery.  Took another home COVID test that was negative.      REVIEW OF SYSTEMS  As per HPI, otherwise a 10 point review of systems is negative    PAST MEDICAL HISTORY  Past Medical History:   Diagnosis Date   • Asthma     Inhaler PRN   • Bowel habit changes     diarrhea   • Heart burn    • Indigestion    • Psychiatric problem     Bipolar   • Seizure disorder (HCC) 05/21/2018    epilepsy; 5/21/2018 nausea/vomiting difficulty holding down seizure medication; PCP, Dr. Shun Flowers monitors seizures       SOCIAL HISTORY  Social History     Tobacco Use   • Smoking status: Former Smoker     Packs/day: 0.50     Years: 4.00     Pack years: 2.00     Types: Cigarettes   • Smokeless tobacco: Never Used   Substance Use Topics   • Alcohol use: Yes     Comment: none since 2011   • Drug use: Yes     Types: Intravenous     Comment: none since 2010       SURGICAL HISTORY  Past Surgical History:   Procedure Laterality Date   • GASTROSCOPY  5/25/2018    Procedure: GASTROSCOPY;  Surgeon: Shane Cotter M.D.;  Location: SURGERY [facility];  Service: Gastroenterology   • COLONOSCOPY  5/25/2018    Procedure:  "COLONOSCOPY;  Surgeon: Shane Cotter M.D.;  Location: SURGERY [facility];  Service: Gastroenterology   • CHOLECYSTECTOMY  05/2018   • DENTAL EXTRACTION(S)  2003    wisdom teeth   • GASTROSCOPY  2001       CURRENT MEDICATIONS  Home Medications    **Home medications have not yet been reviewed for this encounter**         ALLERGIES  Allergies   Allergen Reactions   • Shellfish Allergy Anaphylaxis   • Coconut Flavor    • Other Drug      Pt states  Monitored NARCOTICS if possible; recovering addict       PHYSICAL EXAM  VITAL SIGNS: BP (!) 182/85   Pulse 85   Temp 36 °C (96.8 °F) (Temporal)   Resp 18   Ht 1.778 m (5' 10\")   Wt 110 kg (241 lb 6.5 oz)   SpO2 97%   BMI 34.64 kg/m²    Constitutional: Awake and alert  HENT: Normal inspection  Eyes: Normal inspection  Neck: Grossly normal range of motion.  Cardiovascular: Normal heart rate, Normal rhythm.  Symmetric peripheral pulses.   Thorax & Lungs: No respiratory distress, no wheezing.  When she takes a deep breath she coughs.  No focal crackles or rhonchi  Abdomen: Bowel sounds normal, soft, non-distended, nontender, no mass  Skin: No obvious rash.  Back: No tenderness, No CVA tenderness.   Extremities: No clubbing, cyanosis, edema, no Homans or cords.  Neurologic: Grossly normal   Psychiatric: Normal for situation    RADIOLOGY/PROCEDURES  DX-CHEST-2 VIEWS   Final Result         1. No active cardiopulmonary abnormalities are identified.           Imaging is interpreted by radiologist    Labs:  Results for orders placed or performed during the hospital encounter of 08/08/22   CBC WITH DIFFERENTIAL   Result Value Ref Range    WBC 12.9 (H) 4.8 - 10.8 K/uL    RBC 5.13 4.20 - 5.40 M/uL    Hemoglobin 15.4 12.0 - 16.0 g/dL    Hematocrit 46.0 37.0 - 47.0 %    MCV 89.7 81.4 - 97.8 fL    MCH 30.0 27.0 - 33.0 pg    MCHC 33.5 (L) 33.6 - 35.0 g/dL    RDW 41.4 35.9 - 50.0 fL    Platelet Count 435 164 - 446 K/uL    MPV 9.6 9.0 - 12.9 fL    Neutrophils-Polys 70.20 44.00 " - 72.00 %    Lymphocytes 20.20 (L) 22.00 - 41.00 %    Monocytes 6.60 0.00 - 13.40 %    Eosinophils 1.80 0.00 - 6.90 %    Basophils 0.70 0.00 - 1.80 %    Immature Granulocytes 0.50 0.00 - 0.90 %    Nucleated RBC 0.00 /100 WBC    Neutrophils (Absolute) 9.07 (H) 2.00 - 7.15 K/uL    Lymphs (Absolute) 2.61 1.00 - 4.80 K/uL    Monos (Absolute) 0.85 0.00 - 0.85 K/uL    Eos (Absolute) 0.23 0.00 - 0.51 K/uL    Baso (Absolute) 0.09 0.00 - 0.12 K/uL    Immature Granulocytes (abs) 0.06 0.00 - 0.11 K/uL    NRBC (Absolute) 0.00 K/uL   COMP METABOLIC PANEL   Result Value Ref Range    Sodium 139 135 - 145 mmol/L    Potassium 3.6 3.6 - 5.5 mmol/L    Chloride 103 96 - 112 mmol/L    Co2 23 20 - 33 mmol/L    Anion Gap 13.0 7.0 - 16.0    Glucose 99 65 - 99 mg/dL    Bun 10 8 - 22 mg/dL    Creatinine 0.99 0.50 - 1.40 mg/dL    Calcium 9.8 8.4 - 10.2 mg/dL    AST(SGOT) 23 12 - 45 U/L    ALT(SGPT) 52 (H) 2 - 50 U/L    Alkaline Phosphatase 117 (H) 30 - 99 U/L    Total Bilirubin 0.2 0.1 - 1.5 mg/dL    Albumin 4.7 3.2 - 4.9 g/dL    Total Protein 8.1 6.0 - 8.2 g/dL    Globulin 3.4 1.9 - 3.5 g/dL    A-G Ratio 1.4 g/dL   TROPONIN   Result Value Ref Range    Troponin T <6 6 - 19 ng/L   HCG QUAL SERUM   Result Value Ref Range    Beta-Hcg Qualitative Serum Negative Negative   CoV-2, FLU A/B, and RSV by PCR (2-4 Hours CEPHEID) : Collect NP swab in VTM    Specimen: Respirate   Result Value Ref Range    SARS-CoV-2 Source NP Swab    ESTIMATED GFR   Result Value Ref Range    GFR (CKD-EPI) 77 >60 mL/min/1.73 m 2   EKG   Result Value Ref Range    Report       [facility] Emergency Dept.    Test Date:  2022  Pt Name:    ANATOLIY PALACIOS                 Department: [facility]  MRN:        [id number]                      Room:  Gender:     Female                       Technician: [id number]  :        [id number]                   Requested By:ER TRIAGE PROTOCOL  Order #:    [id number]                    Reading MD: AMANDA JENNINGS,  MD    Measurements  Intervals                                Axis  Rate:       101                          P:          70  ID:         [id number]                          QRS:        88  QRSD:       90                           T:          -54  QT:         340  QTc:        441    Interpretive Statements  SINUS TACHYCARDIA  ARTIFACT  Nonspecific ST changes  Compared to ECG 04/18/2017 12:16:26  Electronically Signed On 8-8-2022 10:59:22 PDT by AMANDA JENNINGS MD         Low risk heart score  COURSE & MEDICAL DECISION MAKING  She presents with prolonged URI symptoms with pronounced chest pain and now hemoptysis.  Her physical exam was unremarkable.  No suggestion of asthma exacerbation.  Vital signs are stable.  Obtain work-up.  EKG does not show ischemia she is tachycardic.  Laboratory data with minimally elevated LFTs requiring recheck and mildly elevated WBC count.  Her troponin is negative ruling out ACS.  D-dimer elevated obtain CT pulmonary angiogram that is negative for PE or infiltrate or other alarming findings.  At this point I suspect that this is still her upper respiratory infection without evidence of bacterial infection.  I advised continue nebulizer treatments at home although I do not see any evidence of asthma exacerbation.  Rest plenty of fluids over-the-counter remedy.  Stop smoking.  Patient to return to ER for difficulty breathing, worsening symptoms, not improving or concern.      FINAL IMPRESSION  1.  Chest pain  2.  Upper respiratory infection/bronchitis      This dictation was created using voice recognition software. The accuracy of the dictation is limited to the abilities of the software.  The nursing notes were reviewed and certain aspects of this information were incorporated into this note.      Electronically signed by: Amanda Jennings M.D., 8/8/2022 12:07 PM

## 2024-01-15 ENCOUNTER — APPOINTMENT (OUTPATIENT)
Dept: RADIOLOGY | Facility: MEDICAL CENTER | Age: 34
End: 2024-01-15
Attending: EMERGENCY MEDICINE
Payer: COMMERCIAL

## 2024-01-15 ENCOUNTER — HOSPITAL ENCOUNTER (EMERGENCY)
Facility: MEDICAL CENTER | Age: 34
End: 2024-01-15
Attending: EMERGENCY MEDICINE
Payer: COMMERCIAL

## 2024-01-15 VITALS
HEART RATE: 67 BPM | RESPIRATION RATE: 13 BRPM | SYSTOLIC BLOOD PRESSURE: 108 MMHG | WEIGHT: 236 LBS | HEIGHT: 70 IN | OXYGEN SATURATION: 96 % | DIASTOLIC BLOOD PRESSURE: 58 MMHG | TEMPERATURE: 97.5 F | BODY MASS INDEX: 33.79 KG/M2

## 2024-01-15 DIAGNOSIS — R10.9 ABDOMINAL PAIN, UNSPECIFIED ABDOMINAL LOCATION: ICD-10-CM

## 2024-01-15 LAB
ALBUMIN SERPL BCP-MCNC: 4 G/DL (ref 3.2–4.9)
ALBUMIN/GLOB SERPL: 1.4 G/DL
ALP SERPL-CCNC: 69 U/L (ref 30–99)
ALT SERPL-CCNC: 23 U/L (ref 2–50)
ANION GAP SERPL CALC-SCNC: 12 MMOL/L (ref 7–16)
APPEARANCE UR: CLEAR
AST SERPL-CCNC: 15 U/L (ref 12–45)
BASOPHILS # BLD AUTO: 0.9 % (ref 0–1.8)
BASOPHILS # BLD: 0.07 K/UL (ref 0–0.12)
BILIRUB SERPL-MCNC: 0.2 MG/DL (ref 0.1–1.5)
BILIRUB UR QL STRIP.AUTO: NEGATIVE
BUN SERPL-MCNC: 12 MG/DL (ref 8–22)
CALCIUM ALBUM COR SERPL-MCNC: 8.4 MG/DL (ref 8.5–10.5)
CALCIUM SERPL-MCNC: 8.4 MG/DL (ref 8.5–10.5)
CHLORIDE SERPL-SCNC: 109 MMOL/L (ref 96–112)
CO2 SERPL-SCNC: 18 MMOL/L (ref 20–33)
COLOR UR: YELLOW
CREAT SERPL-MCNC: 0.84 MG/DL (ref 0.5–1.4)
EOSINOPHIL # BLD AUTO: 0.19 K/UL (ref 0–0.51)
EOSINOPHIL NFR BLD: 2.6 % (ref 0–6.9)
ERYTHROCYTE [DISTWIDTH] IN BLOOD BY AUTOMATED COUNT: 41.7 FL (ref 35.9–50)
GFR SERPLBLD CREATININE-BSD FMLA CKD-EPI: 93 ML/MIN/1.73 M 2
GLOBULIN SER CALC-MCNC: 2.8 G/DL (ref 1.9–3.5)
GLUCOSE SERPL-MCNC: 96 MG/DL (ref 65–99)
GLUCOSE UR STRIP.AUTO-MCNC: NEGATIVE MG/DL
HCG SERPL QL: NEGATIVE
HCT VFR BLD AUTO: 43 % (ref 37–47)
HGB BLD-MCNC: 14 G/DL (ref 12–16)
KETONES UR STRIP.AUTO-MCNC: NEGATIVE MG/DL
LEUKOCYTE ESTERASE UR QL STRIP.AUTO: NEGATIVE
LIPASE SERPL-CCNC: 25 U/L (ref 11–82)
LYMPHOCYTES # BLD AUTO: 2.51 K/UL (ref 1–4.8)
LYMPHOCYTES NFR BLD: 33.9 % (ref 22–41)
MANUAL DIFF BLD: NORMAL
MCH RBC QN AUTO: 29.7 PG (ref 27–33)
MCHC RBC AUTO-ENTMCNC: 32.6 G/DL (ref 32.2–35.5)
MCV RBC AUTO: 91.1 FL (ref 81.4–97.8)
MICRO URNS: NORMAL
MICROCYTES BLD QL SMEAR: ABNORMAL
MONOCYTES # BLD AUTO: 0.52 K/UL (ref 0–0.85)
MONOCYTES NFR BLD AUTO: 7 % (ref 0–13.4)
MORPHOLOGY BLD-IMP: NORMAL
NEUTROPHILS # BLD AUTO: 4.11 K/UL (ref 1.82–7.42)
NEUTROPHILS NFR BLD: 55.6 % (ref 44–72)
NITRITE UR QL STRIP.AUTO: NEGATIVE
NRBC # BLD AUTO: 0 K/UL
NRBC BLD-RTO: 0 /100 WBC (ref 0–0.2)
OVALOCYTES BLD QL SMEAR: NORMAL
PH UR STRIP.AUTO: 5.5 [PH] (ref 5–8)
PLATELET # BLD AUTO: 126 K/UL (ref 164–446)
PLATELET BLD QL SMEAR: NORMAL
PMV BLD AUTO: 10.5 FL (ref 9–12.9)
POIKILOCYTOSIS BLD QL SMEAR: NORMAL
POTASSIUM SERPL-SCNC: 4.2 MMOL/L (ref 3.6–5.5)
PROT SERPL-MCNC: 6.8 G/DL (ref 6–8.2)
PROT UR QL STRIP: NEGATIVE MG/DL
RBC # BLD AUTO: 4.72 M/UL (ref 4.2–5.4)
RBC BLD AUTO: PRESENT
RBC UR QL AUTO: NEGATIVE
SODIUM SERPL-SCNC: 139 MMOL/L (ref 135–145)
SP GR UR STRIP.AUTO: 1.01
UROBILINOGEN UR STRIP.AUTO-MCNC: 0.2 MG/DL
WBC # BLD AUTO: 7.4 K/UL (ref 4.8–10.8)

## 2024-01-15 PROCEDURE — 96374 THER/PROPH/DIAG INJ IV PUSH: CPT

## 2024-01-15 PROCEDURE — 700111 HCHG RX REV CODE 636 W/ 250 OVERRIDE (IP): Mod: UD | Performed by: EMERGENCY MEDICINE

## 2024-01-15 PROCEDURE — 85027 COMPLETE CBC AUTOMATED: CPT

## 2024-01-15 PROCEDURE — 83690 ASSAY OF LIPASE: CPT

## 2024-01-15 PROCEDURE — 84703 CHORIONIC GONADOTROPIN ASSAY: CPT

## 2024-01-15 PROCEDURE — 76830 TRANSVAGINAL US NON-OB: CPT

## 2024-01-15 PROCEDURE — 74177 CT ABD & PELVIS W/CONTRAST: CPT

## 2024-01-15 PROCEDURE — A9270 NON-COVERED ITEM OR SERVICE: HCPCS | Mod: UD | Performed by: EMERGENCY MEDICINE

## 2024-01-15 PROCEDURE — 81003 URINALYSIS AUTO W/O SCOPE: CPT

## 2024-01-15 PROCEDURE — 85007 BL SMEAR W/DIFF WBC COUNT: CPT

## 2024-01-15 PROCEDURE — 96376 TX/PRO/DX INJ SAME DRUG ADON: CPT

## 2024-01-15 PROCEDURE — 700117 HCHG RX CONTRAST REV CODE 255: Mod: UD | Performed by: EMERGENCY MEDICINE

## 2024-01-15 PROCEDURE — 36415 COLL VENOUS BLD VENIPUNCTURE: CPT

## 2024-01-15 PROCEDURE — 700102 HCHG RX REV CODE 250 W/ 637 OVERRIDE(OP): Mod: UD | Performed by: EMERGENCY MEDICINE

## 2024-01-15 PROCEDURE — 80053 COMPREHEN METABOLIC PANEL: CPT

## 2024-01-15 PROCEDURE — 99285 EMERGENCY DEPT VISIT HI MDM: CPT

## 2024-01-15 RX ORDER — ONDANSETRON 4 MG/1
4 TABLET, ORALLY DISINTEGRATING ORAL EVERY 6 HOURS PRN
Qty: 10 TABLET | Refills: 0 | Status: SHIPPED | OUTPATIENT
Start: 2024-01-15

## 2024-01-15 RX ORDER — HYDROMORPHONE HYDROCHLORIDE 1 MG/ML
0.5 INJECTION, SOLUTION INTRAMUSCULAR; INTRAVENOUS; SUBCUTANEOUS ONCE
Status: COMPLETED | OUTPATIENT
Start: 2024-01-15 | End: 2024-01-15

## 2024-01-15 RX ORDER — HYDROCODONE BITARTRATE AND ACETAMINOPHEN 5; 325 MG/1; MG/1
1 TABLET ORAL ONCE
Status: COMPLETED | OUTPATIENT
Start: 2024-01-15 | End: 2024-01-15

## 2024-01-15 RX ORDER — HYDROCODONE BITARTRATE AND ACETAMINOPHEN 5; 325 MG/1; MG/1
1 TABLET ORAL EVERY 6 HOURS PRN
Qty: 10 TABLET | Refills: 0 | Status: SHIPPED | OUTPATIENT
Start: 2024-01-15 | End: 2024-01-18

## 2024-01-15 RX ORDER — HYDROMORPHONE HYDROCHLORIDE 1 MG/ML
1 INJECTION, SOLUTION INTRAMUSCULAR; INTRAVENOUS; SUBCUTANEOUS ONCE
Status: COMPLETED | OUTPATIENT
Start: 2024-01-15 | End: 2024-01-15

## 2024-01-15 RX ADMIN — HYDROCODONE BITARTRATE AND ACETAMINOPHEN 1 TABLET: 5; 325 TABLET ORAL at 14:47

## 2024-01-15 RX ADMIN — HYDROMORPHONE HYDROCHLORIDE 0.5 MG: 1 INJECTION, SOLUTION INTRAMUSCULAR; INTRAVENOUS; SUBCUTANEOUS at 10:37

## 2024-01-15 RX ADMIN — HYDROMORPHONE HYDROCHLORIDE 1 MG: 1 INJECTION, SOLUTION INTRAMUSCULAR; INTRAVENOUS; SUBCUTANEOUS at 11:34

## 2024-01-15 RX ADMIN — IOHEXOL 100 ML: 350 INJECTION, SOLUTION INTRAVENOUS at 13:52

## 2024-01-15 ASSESSMENT — PAIN DESCRIPTION - PAIN TYPE: TYPE: ACUTE PAIN

## 2024-01-15 ASSESSMENT — FIBROSIS 4 INDEX: FIB4 SCORE: 0.26

## 2024-01-15 NOTE — ED TRIAGE NOTES
Chief Complaint   Patient presents with    Pelvic Pain     BIB EMS from Work for pelvic pain. Patient reports having left sided pelvic pain that started at approx. 3 am this morning. Patient attempted to relieve pain with ibuprofen at home with minimal relief. At work, patient used the bathroom and had a gush of blood tinged mucus. HX of ovarian cysts and reports this feels the same.  Patient arrives AOX4, GCS of 15. Received 4 mg of morphine and 4 mg of zofran via EMS.     Reports LMP period starting December 30th.    Vitals:    01/15/24 0929   BP: (P) 127/70   Pulse: (P) 80   Resp: (P) 19   SpO2: (P) 98%

## 2024-01-15 NOTE — ED NOTES
Patient reporting 10/10 pain during US, patient tearful during process. ERP notified, orders placed.

## 2024-01-15 NOTE — DISCHARGE INSTRUCTIONS
Please call the office of Dr. Keller today, and tell them she wants you seen tomorrow morning. They will be expecting your call.  Also, you do not have an ovarian torsion, but I did give you instructions so you would be aware of what to watch for, and when to return.  Return immediately for any pain, fever, or vomiting.

## 2024-01-15 NOTE — ED PROVIDER NOTES
ED Provider Note    CHIEF COMPLAINT  Chief Complaint   Patient presents with    Pelvic Pain       EXTERNAL RECORDS REVIEWED  Outpatient Notes   [facility], surgical center, labor and delivery    HPI/ROS  LIMITATION TO HISTORY   Select: : None  OUTSIDE HISTORIAN(S):  None    Oseas Day is a 33 y.o. female who presents here for evaluation of abdominal pain.  The patient states that she has been having abdominal pain left-sided, since last evening.  Patient states she has history of PCOS, and this feels similar to previous ovarian cysts.  She has some abdominal pain stays in lower abdomen, no chest pain or shortness of breath.  No vomiting, and no fever.  Patient states that she did take some over-the-counter medications for the same, but with little relief.    PAST MEDICAL HISTORY   has a past medical history of Asthma, Bowel habit changes, Heart burn, Indigestion, Psychiatric problem, and Seizure disorder (HCC) (05/21/2018).    SURGICAL HISTORY   has a past surgical history that includes dental extraction(s) (2003); gastroscopy (2001); gastroscopy (5/25/2018); colonoscopy (5/25/2018); and cholecystectomy (05/2018).    FAMILY HISTORY  No family history on file.    SOCIAL HISTORY  Social History     Tobacco Use    Smoking status: Former     Current packs/day: 0.50     Average packs/day: 0.5 packs/day for 4.0 years (2.0 ttl pk-yrs)     Types: Cigarettes    Smokeless tobacco: Never   Vaping Use    Vaping Use: Every day   Substance and Sexual Activity    Alcohol use: Yes     Comment: none since 2011    Drug use: Yes     Types: Intravenous     Comment: none since 2010    Sexual activity: Not on file       CURRENT MEDICATIONS  Home Medications       Reviewed by Marleen Ferraro, Student (Nurse Apprentice) on 01/15/24 at 0933  Med List Status: Partial     Medication Last Dose Status   albuterol 108 (90 Base) MCG/ACT Aero Soln inhalation aerosol  Active   cyclobenzaprine (FLEXERIL) 5 MG tablet  Active   dicyclomine  "(BENTYL) 20 MG Tab  Active   levETIRAcetam (KEPPRA) 500 MG Tab  Active   ondansetron (ZOFRAN ODT) 8 MG TABLET DISPERSIBLE  Active   pantoprazole (PROTONIX) 40 MG Tablet Delayed Response  Active   sucralfate (CARAFATE) 1 GM Tab  Active   tramadol (ULTRAM) 50 MG Tab  Active   traZODone (DESYREL) 50 MG Tab  Active   venlafaxine XR (EFFEXOR XR) 75 MG CAPSULE SR 24 HR  Active   vitamin D, Ergocalciferol, (DRISDOL) 87793 units Cap capsule  Active                    ALLERGIES  Allergies   Allergen Reactions    Shellfish Allergy Anaphylaxis    Coconut Flavor     Other Drug      Pt states  Monitored NARCOTICS if possible; recovering addict    Penicillins      Projectile vomiting       PHYSICAL EXAM  VITAL SIGNS: /70   Pulse 80   Temp 36.3 °C (97.4 °F) (Oral)   Resp 16   Ht 1.778 m (5' 10\")   Wt 107 kg (236 lb)   LMP 12/30/2023 (Approximate)   SpO2 98%   BMI 33.86 kg/m²    Constitutional: Well developed, well nourished. mild acute distress.  HEENT: Normocephalic, atraumatic. Posterior pharynx clear and moist.  Eyes:  EOMI. Normal sclera.  Neck: Supple, Full range of motion, nontender.  Chest/Pulmonary: clear to ausculation. Symmetrical expansion.   Cardio: Regular rate and rhythm with no murmur.   Abdomen: Soft,  No peritoneal signs. Mild diffuse tenderness,  no guarding   Musculoskeletal: No deformity, no edema, neurovascular intact.   Neuro: Clear speech, appropriate, cooperative, cranial nerves II-XII grossly intact.  Psych: Normal mood and affect      DIAGNOSTIC STUDIES / PROCEDURES  Results for orders placed or performed during the hospital encounter of 01/15/24   CBC WITH DIFFERENTIAL   Result Value Ref Range    WBC 7.4 4.8 - 10.8 K/uL    RBC 4.72 4.20 - 5.40 M/uL    Hemoglobin 14.0 12.0 - 16.0 g/dL    Hematocrit 43.0 37.0 - 47.0 %    MCV 91.1 81.4 - 97.8 fL    MCH 29.7 27.0 - 33.0 pg    MCHC 32.6 32.2 - 35.5 g/dL    RDW 41.7 35.9 - 50.0 fL    Platelet Count 126 (L) 164 - 446 K/uL    MPV 10.5 9.0 - 12.9 fL "    Neutrophils-Polys 55.60 44.00 - 72.00 %    Lymphocytes 33.90 22.00 - 41.00 %    Monocytes 7.00 0.00 - 13.40 %    Eosinophils 2.60 0.00 - 6.90 %    Basophils 0.90 0.00 - 1.80 %    Nucleated RBC 0.00 0.00 - 0.20 /100 WBC    Neutrophils (Absolute) 4.11 1.82 - 7.42 K/uL    Lymphs (Absolute) 2.51 1.00 - 4.80 K/uL    Monos (Absolute) 0.52 0.00 - 0.85 K/uL    Eos (Absolute) 0.19 0.00 - 0.51 K/uL    Baso (Absolute) 0.07 0.00 - 0.12 K/uL    NRBC (Absolute) 0.00 K/uL    Microcytosis 1+    COMP METABOLIC PANEL   Result Value Ref Range    Sodium 139 135 - 145 mmol/L    Potassium 4.2 3.6 - 5.5 mmol/L    Chloride 109 96 - 112 mmol/L    Co2 18 (L) 20 - 33 mmol/L    Anion Gap 12.0 7.0 - 16.0    Glucose 96 65 - 99 mg/dL    Bun 12 8 - 22 mg/dL    Creatinine 0.84 0.50 - 1.40 mg/dL    Calcium 8.4 (L) 8.5 - 10.5 mg/dL    Correct Calcium 8.4 (L) 8.5 - 10.5 mg/dL    AST(SGOT) 15 12 - 45 U/L    ALT(SGPT) 23 2 - 50 U/L    Alkaline Phosphatase 69 30 - 99 U/L    Total Bilirubin 0.2 0.1 - 1.5 mg/dL    Albumin 4.0 3.2 - 4.9 g/dL    Total Protein 6.8 6.0 - 8.2 g/dL    Globulin 2.8 1.9 - 3.5 g/dL    A-G Ratio 1.4 g/dL   LIPASE   Result Value Ref Range    Lipase 25 11 - 82 U/L   HCG QUAL SERUM   Result Value Ref Range    Beta-Hcg Qualitative Serum Negative Negative   URINALYSIS,CULTURE IF INDICATED    Specimen: Urine, Clean Catch   Result Value Ref Range    Color Yellow     Character Clear     Specific Gravity 1.013 <1.035    Ph 5.5 5.0 - 8.0    Glucose Negative Negative mg/dL    Ketones Negative Negative mg/dL    Protein Negative Negative mg/dL    Bilirubin Negative Negative    Urobilinogen, Urine 0.2 Negative    Nitrite Negative Negative    Leukocyte Esterase Negative Negative    Occult Blood Negative Negative    Micro Urine Req see below    ESTIMATED GFR   Result Value Ref Range    GFR (CKD-EPI) 93 >60 mL/min/1.73 m 2   DIFFERENTIAL MANUAL   Result Value Ref Range    Manual Diff Status PERFORMED    PERIPHERAL SMEAR REVIEW   Result Value Ref  Range    Peripheral Smear Review see below    PLATELET ESTIMATE   Result Value Ref Range    Plt Estimation Decreased    MORPHOLOGY   Result Value Ref Range    RBC Morphology Present     Poikilocytosis 1+     Ovalocytes 1+          RADIOLOGY  I have independently interpreted the diagnostic imaging associated with this visit and am waiting the final reading from the radiologist.   My preliminary interpretation is as follows: see below   Radiologist interpretation:   CT-ABDOMEN-PELVIS WITH   Final Result      Somewhat hypodense LEFT ovary may be normal anatomic variant however torsion is not excluded by this exam and should be considered clinically. The LEFT ovary was not seen on the pelvic ultrasound.      US-PELVIC COMPLETE (TRANSABDOMINAL/TRANSVAGINAL) (COMBO)   Final Result      1.  LEFT ovary not visualized.   2.  Trace pelvic fluid   3.  Trace endometrial fluid            COURSE & MEDICAL DECISION MAKING    Discharge home     INITIAL ASSESSMENT, COURSE AND PLAN  Care Narrative: This is a 33-year-old female here for evaluation of abdominal pain.  Patient states that she has had intermittent abdominal pain since yesterday, does have history of ovarian cysts.  The ultrasound did not visualize the left ovary, and after speaking with Dr. Keller she states that these ovarian cysts that tors are very large typically, and that should have been able to see that on the ultrasound.  CT scan was then evaluated.  Patient currently, at 2:30 PM, has no pain, is resting comfortably in the room, and is nontoxic-appearing.  She has no fever.  Dr. Morrison states that she will see her in the morning, and I gave her the patient's name and medical number to review the images.    2:18 PM  Ct notes possible torsion.  Dr. Keller contacted via voalte.     2:28 PM  I spoke to Dr. Keller.  She states that if the pt is clinically well, no fever, and not currently in pain (as she is not in pain now), she can see her in the am.  We  discussed the images of the ct and the u/s.  Patient's pregnancy test is negative.    DISPOSITION AND DISCUSSIONS  I have discussed management of the patient with the following physicians and JADE's:  none    Discussion of management with other QHP or appropriate source(s): none     Escalation of care considered, and ultimately not performed: None    Barriers to care at this time, including but not limited to: Patient does not have established PCP.     Decision tools and prescription drugs considered including, but not limited to: None.    FINAL DIAGNOSIS  Abdominal pain       Electronically signed by: Juarez Eubanks D.O., 1/15/2024 11:50 AM

## 2024-01-26 ENCOUNTER — PRE-ADMISSION TESTING (OUTPATIENT)
Dept: ADMISSIONS | Facility: MEDICAL CENTER | Age: 34
End: 2024-01-26
Attending: OBSTETRICS & GYNECOLOGY
Payer: COMMERCIAL

## 2024-01-26 RX ORDER — ZONISAMIDE 100 MG/1
400 CAPSULE ORAL DAILY
COMMUNITY
Start: 2023-12-07

## 2024-01-29 ENCOUNTER — PRE-ADMISSION TESTING (OUTPATIENT)
Dept: ADMISSIONS | Facility: MEDICAL CENTER | Age: 34
End: 2024-01-29
Attending: OBSTETRICS & GYNECOLOGY
Payer: COMMERCIAL

## 2024-01-29 DIAGNOSIS — Z01.812 PRE-OPERATIVE LABORATORY EXAMINATION: ICD-10-CM

## 2024-01-29 LAB
ABO GROUP BLD: NORMAL
ANION GAP SERPL CALC-SCNC: 9 MMOL/L (ref 7–16)
BASOPHILS # BLD AUTO: 0.8 % (ref 0–1.8)
BASOPHILS # BLD: 0.07 K/UL (ref 0–0.12)
BLD GP AB SCN SERPL QL: NORMAL
BUN SERPL-MCNC: 12 MG/DL (ref 8–22)
CALCIUM SERPL-MCNC: 9 MG/DL (ref 8.5–10.5)
CHLORIDE SERPL-SCNC: 105 MMOL/L (ref 96–112)
CO2 SERPL-SCNC: 24 MMOL/L (ref 20–33)
CREAT SERPL-MCNC: 0.92 MG/DL (ref 0.5–1.4)
EOSINOPHIL # BLD AUTO: 0.41 K/UL (ref 0–0.51)
EOSINOPHIL NFR BLD: 4.4 % (ref 0–6.9)
ERYTHROCYTE [DISTWIDTH] IN BLOOD BY AUTOMATED COUNT: 41.3 FL (ref 35.9–50)
GFR SERPLBLD CREATININE-BSD FMLA CKD-EPI: 83 ML/MIN/1.73 M 2
GLUCOSE SERPL-MCNC: 98 MG/DL (ref 65–99)
HCG SERPL QL: NEGATIVE
HCT VFR BLD AUTO: 45.9 % (ref 37–47)
HGB BLD-MCNC: 15.2 G/DL (ref 12–16)
IMM GRANULOCYTES # BLD AUTO: 0.03 K/UL (ref 0–0.11)
IMM GRANULOCYTES NFR BLD AUTO: 0.3 % (ref 0–0.9)
LYMPHOCYTES # BLD AUTO: 2.74 K/UL (ref 1–4.8)
LYMPHOCYTES NFR BLD: 29.7 % (ref 22–41)
MCH RBC QN AUTO: 29.9 PG (ref 27–33)
MCHC RBC AUTO-ENTMCNC: 33.1 G/DL (ref 32.2–35.5)
MCV RBC AUTO: 90.4 FL (ref 81.4–97.8)
MONOCYTES # BLD AUTO: 0.75 K/UL (ref 0–0.85)
MONOCYTES NFR BLD AUTO: 8.1 % (ref 0–13.4)
NEUTROPHILS # BLD AUTO: 5.22 K/UL (ref 1.82–7.42)
NEUTROPHILS NFR BLD: 56.7 % (ref 44–72)
NRBC # BLD AUTO: 0 K/UL
NRBC BLD-RTO: 0 /100 WBC (ref 0–0.2)
PLATELET # BLD AUTO: 375 K/UL (ref 164–446)
PMV BLD AUTO: 9.9 FL (ref 9–12.9)
POTASSIUM SERPL-SCNC: 4.3 MMOL/L (ref 3.6–5.5)
RBC # BLD AUTO: 5.08 M/UL (ref 4.2–5.4)
RH BLD: NORMAL
SODIUM SERPL-SCNC: 138 MMOL/L (ref 135–145)
WBC # BLD AUTO: 9.2 K/UL (ref 4.8–10.8)

## 2024-01-29 PROCEDURE — 85025 COMPLETE CBC W/AUTO DIFF WBC: CPT

## 2024-01-29 PROCEDURE — 84703 CHORIONIC GONADOTROPIN ASSAY: CPT

## 2024-01-29 PROCEDURE — 86850 RBC ANTIBODY SCREEN: CPT

## 2024-01-29 PROCEDURE — 86900 BLOOD TYPING SEROLOGIC ABO: CPT

## 2024-01-29 PROCEDURE — 80048 BASIC METABOLIC PNL TOTAL CA: CPT

## 2024-01-29 PROCEDURE — 86901 BLOOD TYPING SEROLOGIC RH(D): CPT

## 2024-01-29 PROCEDURE — 36415 COLL VENOUS BLD VENIPUNCTURE: CPT
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
## 2024-01-30 ENCOUNTER — ANESTHESIA (OUTPATIENT)
Dept: SURGERY | Facility: MEDICAL CENTER | Age: 34
End: 2024-01-30
Payer: COMMERCIAL

## 2024-01-30 ENCOUNTER — ANESTHESIA EVENT (OUTPATIENT)
Dept: SURGERY | Facility: MEDICAL CENTER | Age: 34
End: 2024-01-30
Payer: COMMERCIAL

## 2024-01-30 ENCOUNTER — HOSPITAL ENCOUNTER (OUTPATIENT)
Facility: MEDICAL CENTER | Age: 34
End: 2024-01-30
Attending: OBSTETRICS & GYNECOLOGY | Admitting: OBSTETRICS & GYNECOLOGY
Payer: COMMERCIAL

## 2024-01-30 VITALS
OXYGEN SATURATION: 97 % | RESPIRATION RATE: 8 BRPM | BODY MASS INDEX: 34.72 KG/M2 | HEART RATE: 63 BPM | DIASTOLIC BLOOD PRESSURE: 69 MMHG | WEIGHT: 242.51 LBS | HEIGHT: 70 IN | SYSTOLIC BLOOD PRESSURE: 131 MMHG | TEMPERATURE: 98.4 F

## 2024-01-30 DIAGNOSIS — Z09 SURGERY FOLLOW-UP: ICD-10-CM

## 2024-01-30 LAB — ABO + RH BLD: NORMAL

## 2024-01-30 PROCEDURE — 700101 HCHG RX REV CODE 250: Mod: UD | Performed by: ANESTHESIOLOGY

## 2024-01-30 PROCEDURE — 160048 HCHG OR STATISTICAL LEVEL 1-5: Performed by: OBSTETRICS & GYNECOLOGY

## 2024-01-30 PROCEDURE — 160035 HCHG PACU - 1ST 60 MINS PHASE I: Performed by: OBSTETRICS & GYNECOLOGY

## 2024-01-30 PROCEDURE — 160039 HCHG SURGERY MINUTES - EA ADDL 1 MIN LEVEL 3: Performed by: OBSTETRICS & GYNECOLOGY

## 2024-01-30 PROCEDURE — A9270 NON-COVERED ITEM OR SERVICE: HCPCS | Mod: UD | Performed by: ANESTHESIOLOGY

## 2024-01-30 PROCEDURE — 700111 HCHG RX REV CODE 636 W/ 250 OVERRIDE (IP): Mod: JZ,UD | Performed by: ANESTHESIOLOGY

## 2024-01-30 PROCEDURE — 160009 HCHG ANES TIME/MIN: Performed by: OBSTETRICS & GYNECOLOGY

## 2024-01-30 PROCEDURE — 160002 HCHG RECOVERY MINUTES (STAT): Performed by: OBSTETRICS & GYNECOLOGY

## 2024-01-30 PROCEDURE — 700102 HCHG RX REV CODE 250 W/ 637 OVERRIDE(OP): Mod: UD | Performed by: ANESTHESIOLOGY

## 2024-01-30 PROCEDURE — 36415 COLL VENOUS BLD VENIPUNCTURE: CPT

## 2024-01-30 PROCEDURE — 160028 HCHG SURGERY MINUTES - 1ST 30 MINS LEVEL 3: Performed by: OBSTETRICS & GYNECOLOGY

## 2024-01-30 PROCEDURE — 700111 HCHG RX REV CODE 636 W/ 250 OVERRIDE (IP): Mod: UD | Performed by: ANESTHESIOLOGY

## 2024-01-30 PROCEDURE — 700105 HCHG RX REV CODE 258: Mod: UD | Performed by: OBSTETRICS & GYNECOLOGY

## 2024-01-30 PROCEDURE — 160046 HCHG PACU - 1ST 60 MINS PHASE II: Performed by: OBSTETRICS & GYNECOLOGY

## 2024-01-30 PROCEDURE — 700101 HCHG RX REV CODE 250: Mod: UD | Performed by: OBSTETRICS & GYNECOLOGY

## 2024-01-30 PROCEDURE — 160025 RECOVERY II MINUTES (STATS): Performed by: OBSTETRICS & GYNECOLOGY

## 2024-01-30 RX ORDER — IBUPROFEN 600 MG/1
600 TABLET ORAL EVERY 6 HOURS PRN
Qty: 60 TABLET | Refills: 1 | Status: SHIPPED | OUTPATIENT
Start: 2024-01-30

## 2024-01-30 RX ORDER — ACETAMINOPHEN 500 MG
1000 TABLET ORAL ONCE
Status: DISCONTINUED | OUTPATIENT
Start: 2024-01-30 | End: 2024-01-30 | Stop reason: HOSPADM

## 2024-01-30 RX ORDER — DOCUSATE SODIUM 100 MG/1
100 CAPSULE, LIQUID FILLED ORAL 2 TIMES DAILY
Qty: 60 CAPSULE | Refills: 1 | Status: SHIPPED | OUTPATIENT
Start: 2024-01-30

## 2024-01-30 RX ORDER — ONDANSETRON 2 MG/ML
INJECTION INTRAMUSCULAR; INTRAVENOUS PRN
Status: DISCONTINUED | OUTPATIENT
Start: 2024-01-30 | End: 2024-01-30 | Stop reason: SURG

## 2024-01-30 RX ORDER — SODIUM CHLORIDE, SODIUM LACTATE, POTASSIUM CHLORIDE, CALCIUM CHLORIDE 600; 310; 30; 20 MG/100ML; MG/100ML; MG/100ML; MG/100ML
INJECTION, SOLUTION INTRAVENOUS CONTINUOUS
Status: DISCONTINUED | OUTPATIENT
Start: 2024-01-30 | End: 2024-01-30 | Stop reason: HOSPADM

## 2024-01-30 RX ORDER — ACETAMINOPHEN 500 MG
1000 TABLET ORAL ONCE
Status: COMPLETED | OUTPATIENT
Start: 2024-01-30 | End: 2024-01-30

## 2024-01-30 RX ORDER — DEXAMETHASONE SODIUM PHOSPHATE 4 MG/ML
INJECTION, SOLUTION INTRA-ARTICULAR; INTRALESIONAL; INTRAMUSCULAR; INTRAVENOUS; SOFT TISSUE PRN
Status: DISCONTINUED | OUTPATIENT
Start: 2024-01-30 | End: 2024-01-30 | Stop reason: SURG

## 2024-01-30 RX ORDER — HYDROMORPHONE HYDROCHLORIDE 1 MG/ML
0.4 INJECTION, SOLUTION INTRAMUSCULAR; INTRAVENOUS; SUBCUTANEOUS
Status: DISCONTINUED | OUTPATIENT
Start: 2024-01-30 | End: 2024-01-30 | Stop reason: HOSPADM

## 2024-01-30 RX ORDER — PROMETHAZINE HYDROCHLORIDE 25 MG/1
25 SUPPOSITORY RECTAL EVERY 4 HOURS PRN
Status: DISCONTINUED | OUTPATIENT
Start: 2024-01-30 | End: 2024-01-30 | Stop reason: HOSPADM

## 2024-01-30 RX ORDER — ONDANSETRON 2 MG/ML
4 INJECTION INTRAMUSCULAR; INTRAVENOUS
Status: DISCONTINUED | OUTPATIENT
Start: 2024-01-30 | End: 2024-01-30 | Stop reason: HOSPADM

## 2024-01-30 RX ORDER — MEPERIDINE HYDROCHLORIDE 25 MG/ML
6.25 INJECTION INTRAMUSCULAR; INTRAVENOUS; SUBCUTANEOUS
Status: DISCONTINUED | OUTPATIENT
Start: 2024-01-30 | End: 2024-01-30 | Stop reason: HOSPADM

## 2024-01-30 RX ORDER — HALOPERIDOL 5 MG/ML
1 INJECTION INTRAMUSCULAR
Status: DISCONTINUED | OUTPATIENT
Start: 2024-01-30 | End: 2024-01-30 | Stop reason: HOSPADM

## 2024-01-30 RX ORDER — CELECOXIB 200 MG/1
200 CAPSULE ORAL ONCE
Status: DISCONTINUED | OUTPATIENT
Start: 2024-01-30 | End: 2024-01-30 | Stop reason: HOSPADM

## 2024-01-30 RX ORDER — OXYCODONE HCL 5 MG/5 ML
5 SOLUTION, ORAL ORAL
Status: DISCONTINUED | OUTPATIENT
Start: 2024-01-30 | End: 2024-01-30 | Stop reason: HOSPADM

## 2024-01-30 RX ORDER — CELECOXIB 200 MG/1
200 CAPSULE ORAL ONCE
Status: COMPLETED | OUTPATIENT
Start: 2024-01-30 | End: 2024-01-30

## 2024-01-30 RX ORDER — HYDROMORPHONE HYDROCHLORIDE 1 MG/ML
0.1 INJECTION, SOLUTION INTRAMUSCULAR; INTRAVENOUS; SUBCUTANEOUS
Status: DISCONTINUED | OUTPATIENT
Start: 2024-01-30 | End: 2024-01-30 | Stop reason: HOSPADM

## 2024-01-30 RX ORDER — HYDROMORPHONE HYDROCHLORIDE 1 MG/ML
0.2 INJECTION, SOLUTION INTRAMUSCULAR; INTRAVENOUS; SUBCUTANEOUS
Status: DISCONTINUED | OUTPATIENT
Start: 2024-01-30 | End: 2024-01-30 | Stop reason: HOSPADM

## 2024-01-30 RX ORDER — BUPIVACAINE HYDROCHLORIDE AND EPINEPHRINE 2.5; 5 MG/ML; UG/ML
INJECTION, SOLUTION EPIDURAL; INFILTRATION; INTRACAUDAL; PERINEURAL
Status: DISCONTINUED | OUTPATIENT
Start: 2024-01-30 | End: 2024-01-30 | Stop reason: HOSPADM

## 2024-01-30 RX ORDER — LIDOCAINE HYDROCHLORIDE 20 MG/ML
INJECTION, SOLUTION EPIDURAL; INFILTRATION; INTRACAUDAL; PERINEURAL PRN
Status: DISCONTINUED | OUTPATIENT
Start: 2024-01-30 | End: 2024-01-30 | Stop reason: SURG

## 2024-01-30 RX ORDER — DIPHENHYDRAMINE HYDROCHLORIDE 50 MG/ML
12.5 INJECTION INTRAMUSCULAR; INTRAVENOUS
Status: DISCONTINUED | OUTPATIENT
Start: 2024-01-30 | End: 2024-01-30 | Stop reason: HOSPADM

## 2024-01-30 RX ORDER — CEFAZOLIN SODIUM 1 G/3ML
INJECTION, POWDER, FOR SOLUTION INTRAMUSCULAR; INTRAVENOUS PRN
Status: DISCONTINUED | OUTPATIENT
Start: 2024-01-30 | End: 2024-01-30 | Stop reason: SURG

## 2024-01-30 RX ORDER — HYDROCODONE BITARTRATE AND ACETAMINOPHEN 5; 325 MG/1; MG/1
1 TABLET ORAL EVERY 4 HOURS PRN
Qty: 20 TABLET | Refills: 0 | Status: SHIPPED | OUTPATIENT
Start: 2024-01-30 | End: 2024-02-06

## 2024-01-30 RX ORDER — MIDAZOLAM HYDROCHLORIDE 1 MG/ML
INJECTION INTRAMUSCULAR; INTRAVENOUS PRN
Status: DISCONTINUED | OUTPATIENT
Start: 2024-01-30 | End: 2024-01-30 | Stop reason: SURG

## 2024-01-30 RX ORDER — BUPIVACAINE HYDROCHLORIDE 2.5 MG/ML
INJECTION, SOLUTION EPIDURAL; INFILTRATION; INTRACAUDAL
Status: DISCONTINUED
Start: 2024-01-30 | End: 2024-01-30 | Stop reason: HOSPADM

## 2024-01-30 RX ORDER — OXYCODONE HCL 5 MG/5 ML
10 SOLUTION, ORAL ORAL
Status: DISCONTINUED | OUTPATIENT
Start: 2024-01-30 | End: 2024-01-30 | Stop reason: HOSPADM

## 2024-01-30 RX ADMIN — CELECOXIB 200 MG: 200 CAPSULE ORAL at 15:54

## 2024-01-30 RX ADMIN — SUGAMMADEX 200 MG: 100 INJECTION, SOLUTION INTRAVENOUS at 15:32

## 2024-01-30 RX ADMIN — ROCURONIUM BROMIDE 50 MG: 10 INJECTION, SOLUTION INTRAVENOUS at 14:50

## 2024-01-30 RX ADMIN — MEPERIDINE HYDROCHLORIDE 6.25 MG: 25 INJECTION INTRAMUSCULAR; INTRAVENOUS; SUBCUTANEOUS at 15:57

## 2024-01-30 RX ADMIN — FENTANYL CITRATE 50 MCG: 50 INJECTION, SOLUTION INTRAMUSCULAR; INTRAVENOUS at 16:24

## 2024-01-30 RX ADMIN — DEXAMETHASONE SODIUM PHOSPHATE 4 MG: 4 INJECTION INTRA-ARTICULAR; INTRALESIONAL; INTRAMUSCULAR; INTRAVENOUS; SOFT TISSUE at 14:57

## 2024-01-30 RX ADMIN — MIDAZOLAM HYDROCHLORIDE 2 MG: 1 INJECTION, SOLUTION INTRAMUSCULAR; INTRAVENOUS at 14:47

## 2024-01-30 RX ADMIN — ONDANSETRON 4 MG: 2 INJECTION INTRAMUSCULAR; INTRAVENOUS at 15:32

## 2024-01-30 RX ADMIN — FENTANYL CITRATE 50 MCG: 50 INJECTION, SOLUTION INTRAMUSCULAR; INTRAVENOUS at 14:50

## 2024-01-30 RX ADMIN — SODIUM CHLORIDE, POTASSIUM CHLORIDE, SODIUM LACTATE AND CALCIUM CHLORIDE: 600; 310; 30; 20 INJECTION, SOLUTION INTRAVENOUS at 14:47

## 2024-01-30 RX ADMIN — LIDOCAINE HYDROCHLORIDE 100 MG: 20 INJECTION, SOLUTION EPIDURAL; INFILTRATION; INTRACAUDAL at 14:50

## 2024-01-30 RX ADMIN — CEFAZOLIN 2 G: 1 INJECTION, POWDER, FOR SOLUTION INTRAMUSCULAR; INTRAVENOUS at 14:54

## 2024-01-30 RX ADMIN — FENTANYL CITRATE 50 MCG: 50 INJECTION, SOLUTION INTRAMUSCULAR; INTRAVENOUS at 15:21

## 2024-01-30 RX ADMIN — PROPOFOL 200 MG: 10 INJECTION, EMULSION INTRAVENOUS at 14:50

## 2024-01-30 RX ADMIN — ACETAMINOPHEN 1000 MG: 500 TABLET ORAL at 15:54

## 2024-01-30 ASSESSMENT — PAIN DESCRIPTION - PAIN TYPE
TYPE: SURGICAL PAIN

## 2024-01-30 ASSESSMENT — FIBROSIS 4 INDEX: FIB4 SCORE: 0.29

## 2024-01-30 NOTE — ANESTHESIA PREPROCEDURE EVALUATION
Case: [id number] Date/Time: 01/30/24 1445    Procedure: DIAGNOSTIC LAPAROSCOPY WITH POSSIBLE LEFT OVARIAN CYSTECTOMY VERSUS LEFT OOPHORECTOMY AND ANY OTHER MEDICALLY NECESSARY PROCEDURES    Pre-op diagnosis: PELVIC PAIN    Location: [facility] ROOM 23 / SURGERY SAME DAY [facility]    Surgeons: Genny Keller M.D.          32yo female c h/o mild asthma and seizure disorder, vapes nicotine  Relevant Problems   No relevant active problems       Physical Exam    Airway   Mallampati: I  TM distance: >3 FB  Neck ROM: full       Cardiovascular - normal exam  Rhythm: regular  Rate: normal  (-) murmur     Dental - normal exam           Pulmonary - normal exam  Breath sounds clear to auscultation     Abdominal    Neurological - normal exam                   Anesthesia Plan    ASA 2       Plan - general       Airway plan will be ETT          Induction: intravenous    Postoperative Plan: Postoperative administration of opioids is intended.    Pertinent diagnostic labs and testing reviewed    Informed Consent:    Anesthetic plan and risks discussed with patient.    Use of blood products discussed with: patient whom consented to blood products.

## 2024-01-30 NOTE — ANESTHESIA TIME REPORT
Anesthesia Start and Stop Event Times       Date Time Event    1/30/2024 1432 Ready for Procedure     1447 Anesthesia Start     1545 Anesthesia Stop          Responsible Staff  01/30/24      Name Role Begin End    Princess David M.D. Anesth 1447 1540          Overtime Reason:  no overtime (within assigned shift)    Comments:

## 2024-01-30 NOTE — OR SURGEON
Immediate Post OP Note    PreOp Diagnosis: left pelvic pain      PostOp Diagnosis: same      Procedure(s):  DIAGNOSTIC LAPAROSCOPY - Wound Class: Clean Contaminated    Surgeon(s):  Genny Keller M.D.    Anesthesiologist/Type of Anesthesia:  Anesthesiologist: Princess David M.D./General    Surgical Staff:  Circulator: Kate Fuentes R.N.  Relief Circulator: Laura Avalos R.N.  Scrub Person: Ray Pepper    Specimens removed if any:  none    Estimated Blood Loss: less than 50 ml  UO: 100 ml, clear  IVF: 600 ml LR    Findings: RV uterus about 8 week size, normal bilateral fallopian tubes and ovaries, normal liver, surgically absent gallbladder, normal appendix    Complications: none        1/30/2024 3:41 PM Genny Keller M.D.

## 2024-01-30 NOTE — OR NURSING
1540 received patient from OR. Report from Anesthesiologist and OR RN. Patient on 4L at 98 % O2. VSS. Monitor connected. No airway in place. S/P laparoscopy. Sites x2 on abdomen covered with band aids, dressing CDI. Dia pad in place with no drainage at this time.     1550 Significant other notified about patients status and plan of care.     1554 Pain medication given see MAR. Patient tolerating oral fluids well.     1557 Demerol given for shivering.    1606 Significant other at bedside.     1615 Patient up to restroom, getting dressed with assistance from significant other.     1622 Patient voided without complication, sitting up in recliner.    1624 Pain medication given see MAR.    1630 Discharge instructions covered with significant other, verbalizes understanding All questions answered at this time.     1645 IV removed.     1648 Patient escorted out to responsible adult via wheelchair by RN. Belongings with patient, ambulated with steady gait. Discharge paperwork and instructions reviewed, signed, and sent with patient.

## 2024-01-30 NOTE — ANESTHESIA PROCEDURE NOTES
Airway    Date/Time: 1/30/2024 2:51 PM    Performed by: Princess David M.D.  Authorized by: Princess David M.D.    Location:  OR  Urgency:  Elective  Difficult Airway: No    Indications for Airway Management:  Anesthesia      Spontaneous Ventilation: absent    Sedation Level:  Deep  Preoxygenated: Yes    Patient Position:  Sniffing  Mask Difficulty Assessment:  1 - vent by mask  Final Airway Type:  Endotracheal airway  Final Endotracheal Airway:  ETT  Cuffed: Yes    Technique Used for Successful ETT Placement:  Direct laryngoscopy    Insertion Site:  Oral  Blade Type:  Jeannie  Laryngoscope Blade/Videolaryngoscope Blade Size:  3  ETT Size (mm):  7.0  Measured from:  Teeth  ETT to Teeth (cm):  21  Placement Verified by: auscultation and capnometry    Cormack-Lehane Classification:  Grade I - full view of glottis  Number of Attempts at Approach:  1  Number of Other Approaches Attempted:  0

## 2024-01-30 NOTE — DISCHARGE INSTRUCTIONS
If any questions arise, call your provider.  If your provider is not available, please feel free to call the Surgical Center at [phone].    MEDICATIONS: Resume taking daily medication.  Take prescribed pain medication with food.  If no medication is prescribed, you may take non-aspirin pain medication if needed.  PAIN MEDICATION CAN BE VERY CONSTIPATING.  Take a stool softener or laxative such as senokot, pericolace, or milk of magnesia if needed.    Last pain medication given: Tylenol and Celebrex (like Ibuprofen/motrin) given at 3:55 PM     What to Expect Post Anesthesia    Rest and take it easy for the first 24 hours.  A responsible adult is recommended to remain with you during that time.  It is normal to feel sleepy.  We encourage you to not do anything that requires balance, judgment or coordination.    FOR 24 HOURS DO NOT:  Drive, operate machinery or run household appliances.  Drink beer or alcoholic beverages.  Make important decisions or sign legal documents.    To avoid nausea, slowly advance diet as tolerated, avoiding spicy or greasy foods for the first day.  Add more substantial food to your diet according to your provider's instructions.  Babies can be fed formula or breast milk as soon as they are hungry.  INCREASE FLUIDS AND FIBER TO AVOID CONSTIPATION.    MILD FLU-LIKE SYMPTOMS ARE NORMAL.  YOU MAY EXPERIENCE GENERALIZED MUSCLE ACHES, THROAT IRRITATION, HEADACHE AND/OR SOME NAUSEA.

## 2024-01-31 NOTE — OP REPORT
DATE OF SERVICE:  01/30/2024     PREOPERATIVE DIAGNOSIS:  Left pelvic pain.     POSTOPERATIVE DIAGNOSIS:  Left pelvic pain.     PROCEDURES:  1.  Exam under anesthesia.  2.  Diagnostic laparoscopy.     SURGEON:  Genny Keller MD     ASSISTANT:  Princess David MD     TYPE OF ANESTHESIA:  General endotracheal anesthesia.     IV FLUIDS:  600 mL of LR.     URINE OUTPUT:  100 mL clear urine at the end of the procedure.     ESTIMATED BLOOD LOSS:  Less than 50 mL.     COMPLICATIONS:  None.     RECOVERY:  Stable to the PACU.     FINDINGS:  Retroverted uterus about 8-week size with normal bilateral   fallopian tubes, normal bilateral ovaries, normal cul-de-sac, no evidence of   any endometriosis and no ovarian cyst seen.  Normal liver, surgically absent   gallbladder, normal appendix.     DESCRIPTION OF PROCEDURE:  The patient was taken to the operating room where   she received uncomplicated general endotracheal anesthesia.  She was placed on   Dillon stirrups, prepped and draped in the usual sterile fashion.  A Robin was   placed to drain her bladder.  A bivalve speculum was placed in the vagina.    The anterior lip of the cervix was grasped with a single tooth tenaculum.  An   acorn cannula was introduced into the uterus to allow for uterine   manipulation.  This was attached to the single tooth tenaculum.  The bivalve   speculum was then removed from the vagina.  Surgeon's gloves were changed.    Attention was then turned to the abdomen where a total of 10 mL of Marcaine   with 0.25% epinephrine was injected, 5 at the umbilicus, 5 mL suprapubically.    An 11 mm umbilical incision was made with a scalpel.  The Veress needle was   then introduced with saline attached.  It was injected and aspirated revealing   clear fluid, no feces and no blood.  The CO2 was then connected.  Initial   entry pressure was read as 3 mmHg.  A 4 liters of CO2 was used to insufflate   the pelvis obtaining an excellent dome.  Veress  needle was then removed and 11   mm trocar was then introduced without any problems.  The laparoscope was   introduced.  Visualization of the pelvis revealed the above findings.  Under   direct visualization, a 5 mm suprapubic incision was made with a scalpel.  A 5   mm trocar was introduced without any problems.  The bowel was packed into the   gutters and the anterior cul-de-sac was normal.  The posterior cul-de-sac was   normal.  No evidence of any endometriosis.  She had normal bilateral   fallopian tubes, normal bilateral ovaries, normal uterus, normal liver,   surgically absent gallbladder.  The appendix was normal.  It did have filmy   adhesions attached to the lower abdominal wall, but it was not on tension.    Otherwise, everything else was normal.  Pictures were taken. The pressure was   taken down.  Under direct visualization, the right 5 mm suprapubic trocar was   removed, the laparoscope was removed.  All the CO2 was expelled from the   pelvis and then the 11 mm umbilical trocar was removed.  The fascial incision   was approximated with a 2-0 Vicryl on a CT-2 needle and then the skin was   approximated with 4-0 Vicryl on SH needle.  In a same fashion, the 5 mm   suprapubic incision, the skin was approximated with 4-0 Vicryl on SH needle.    Band-Aids were placed.  Attention was then turned to the vagina.  The Robin   bulb was deflated and the Robin was removed.  The single tooth tenaculum was   removed from the anterior lip of the cervix and the acorn cannula was removed   from the uterus.  The cervix was found to be hemostatic; therefore, the   speculum was removed from the vagina.  The patient was then taken out of [facility].  She woke up from anesthesia without any problems and was taken to   the recovery room in stable condition.  Lap and needle counts were correct x2.    She did receive 2 grams of Ancef before the start of surgery.        ______________________________  TONEY QUEZADA,  MD GUAMAN/YUDI    DD:  01/30/2024 15:51  DT:  01/30/2024 17:05    Job#:  [id number]    CC:Princess David MD

## 2024-02-01 ASSESSMENT — PAIN SCALES - GENERAL: PAIN_LEVEL: 2

## 2024-02-01 NOTE — ANESTHESIA POSTPROCEDURE EVALUATION
Patient: Oseas Day    Procedure Summary       Date: 01/30/24 Room / Location: [facility] ROOM 23 / SURGERY SAME DAY [facility]    Anesthesia Start: 1447 Anesthesia Stop: 1545    Procedure: DIAGNOSTIC LAPAROSCOPY (Abdomen) Diagnosis: (PELVIC PAIN)    Surgeons: Genny Keller M.D. Responsible Provider: Princess David M.D.    Anesthesia Type: general ASA Status: 2            Final Anesthesia Type: general  Last vitals  BP   Blood Pressure: 131/69    Temp   36.9 °C (98.4 °F)    Pulse   63   Resp   (!) 8    SpO2   97 %      Anesthesia Post Evaluation    Patient location during evaluation: PACU  Patient participation: complete - patient participated  Level of consciousness: awake and alert  Pain score: 2    Airway patency: patent  Anesthetic complications: no  Cardiovascular status: hemodynamically stable  Respiratory status: acceptable  Hydration status: euvolemic    PONV: none          No notable events documented.     Nurse Pain Score: 3 (NPRS)

## 2025-01-04 ENCOUNTER — OFFICE VISIT (OUTPATIENT)
Dept: URGENT CARE | Facility: PHYSICIAN GROUP | Age: 35
End: 2025-01-04
Payer: COMMERCIAL

## 2025-01-04 VITALS
HEIGHT: 62 IN | BODY MASS INDEX: 39.88 KG/M2 | RESPIRATION RATE: 19 BRPM | SYSTOLIC BLOOD PRESSURE: 128 MMHG | OXYGEN SATURATION: 100 % | TEMPERATURE: 99.4 F | HEART RATE: 96 BPM | DIASTOLIC BLOOD PRESSURE: 86 MMHG | WEIGHT: 216.7 LBS

## 2025-01-04 DIAGNOSIS — R50.9 FEVER, UNSPECIFIED FEVER CAUSE: ICD-10-CM

## 2025-01-04 DIAGNOSIS — J03.90 TONSILLITIS: ICD-10-CM

## 2025-01-04 DIAGNOSIS — J10.1 INFLUENZA A: ICD-10-CM

## 2025-01-04 LAB
FLUAV RNA SPEC QL NAA+PROBE: POSITIVE
FLUBV RNA SPEC QL NAA+PROBE: NEGATIVE
RSV RNA SPEC QL NAA+PROBE: NEGATIVE
S PYO DNA SPEC NAA+PROBE: NOT DETECTED
SARS-COV-2 RNA RESP QL NAA+PROBE: NEGATIVE

## 2025-01-04 PROCEDURE — 99203 OFFICE O/P NEW LOW 30 MIN: CPT | Performed by: STUDENT IN AN ORGANIZED HEALTH CARE EDUCATION/TRAINING PROGRAM

## 2025-01-04 PROCEDURE — 87651 STREP A DNA AMP PROBE: CPT | Performed by: STUDENT IN AN ORGANIZED HEALTH CARE EDUCATION/TRAINING PROGRAM

## 2025-01-04 PROCEDURE — 3074F SYST BP LT 130 MM HG: CPT | Performed by: STUDENT IN AN ORGANIZED HEALTH CARE EDUCATION/TRAINING PROGRAM

## 2025-01-04 PROCEDURE — 3079F DIAST BP 80-89 MM HG: CPT | Performed by: STUDENT IN AN ORGANIZED HEALTH CARE EDUCATION/TRAINING PROGRAM

## 2025-01-04 PROCEDURE — 0241U POCT CEPHEID COV-2, FLU A/B, RSV - PCR: CPT | Performed by: STUDENT IN AN ORGANIZED HEALTH CARE EDUCATION/TRAINING PROGRAM

## 2025-01-04 RX ORDER — OSELTAMIVIR PHOSPHATE 75 MG/1
75 CAPSULE ORAL 2 TIMES DAILY
Qty: 10 CAPSULE | Refills: 0 | Status: SHIPPED | OUTPATIENT
Start: 2025-01-04 | End: 2025-01-09

## 2025-01-04 ASSESSMENT — FIBROSIS 4 INDEX: FIB4 SCORE: 0.3

## 2025-01-04 NOTE — PROGRESS NOTES
"Subjective:   Oseas Day is a 34 y.o. female who presents for Flu Like Symptoms (2  DAYS )      HPI:  34-year-old female presents to urgent care for under 48 hours of bodyaches, chills, fever, sore throat, dry cough.  Has been treating her fever with over-the-counter medication.  No one else at home with similar symptoms.  No nausea, vomiting, diarrhea, chest pain, shortness of breath.    Medications:    albuterol Aers  docusate sodium Caps  ibuprofen Tabs  ondansetron Tbdp  zonisamide Caps    Allergies: Shellfish allergy, Coconut flavor, Other drug, and Penicillins    Problem List: Oseas Day does not have any pertinent problems on file.    Surgical History:  Past Surgical History:   Procedure Laterality Date    NH LAP,DIAGNOSTIC ABDOMEN N/A 1/30/2024    Procedure: DIAGNOSTIC LAPAROSCOPY;  Surgeon: Genny Keller M.D.;  Location: SURGERY SAME DAY [facility];  Service: Gynecology    GASTROSCOPY  5/25/2018    Procedure: GASTROSCOPY;  Surgeon: Shane Cotter M.D.;  Location: SURGERY [facility];  Service: Gastroenterology    COLONOSCOPY  5/25/2018    Procedure: COLONOSCOPY;  Surgeon: Shane Cotter M.D.;  Location: SURGERY [facility];  Service: Gastroenterology    CHOLECYSTECTOMY  05/2018    DENTAL EXTRACTION(S)  2003    wisdom teeth    GASTROSCOPY  2001       Past Social Hx: Oseas Day  reports that she has quit smoking. Her smoking use included cigarettes. She has a 2 pack-year smoking history. She has never used smokeless tobacco. She reports that she does not currently use alcohol. She reports current drug use. Drug: Intravenous.     Past Family Hx:  Oseas Day family history is not on file.     Problem list, medications, and allergies reviewed by myself today in Epic.     Objective:     /86   Pulse 96   Temp 37.4 °C (99.4 °F) (Temporal)   Resp 19   Ht 1.575 m (5' 2\")   Wt 98.3 kg (216 lb 11.2 oz)   SpO2 100%   BMI 39.63 kg/m²     Physical " Exam  Vitals reviewed.   HENT:      Right Ear: Tympanic membrane, ear canal and external ear normal.      Left Ear: Tympanic membrane, ear canal and external ear normal.      Nose: Congestion present.      Mouth/Throat:      Mouth: Mucous membranes are moist.      Pharynx: Uvula midline. Posterior oropharyngeal erythema present.      Tonsils: No tonsillar exudate. 1+ on the right. 1+ on the left.   Cardiovascular:      Rate and Rhythm: Normal rate and regular rhythm.      Pulses: Normal pulses.      Heart sounds: Normal heart sounds. No murmur heard.  Pulmonary:      Effort: Pulmonary effort is normal. No respiratory distress.      Breath sounds: Normal breath sounds. No stridor. No wheezing, rhonchi or rales.   Neurological:      Mental Status: She is alert.         Assessment/Plan:     Diagnosis and associated orders:     1. Fever, unspecified fever cause  POCT GROUP A STREP, PCR    POCT CoV-2, Flu A/B, RSV by PCR      2. Tonsillitis  POCT GROUP A STREP, PCR      3. Influenza A  oseltamivir (TAMIFLU) 75 MG Cap         Comments/MDM:     Influenza A positive.  Patient's presentation physical exam findings are consistent with this diagnosis.  Patient has had symptoms for less than 48 hours.  Discussed Tamiflu with patient which she would like at this time.  Discussed home supportive care.  Discussed typical timeframe for resolution of symptoms.  Vitals are stable.  Pulmonary exam shows no wheezing, rales, rhonchi.  No signs of pneumonia.         Differential diagnosis, natural history, supportive care, and indications for immediate follow-up discussed.    Advised the patient to follow-up with the primary care physician for recheck, reevaluation, and consideration of further management.    Please note that this dictation was created using voice recognition software. I have made a reasonable attempt to correct obvious errors, but I expect that there are errors of grammar and possibly content that I did not discover  before finalizing the note.    Electronically signed by Trey Grady PA-C.

## 2025-01-10 ENCOUNTER — OFFICE VISIT (OUTPATIENT)
Dept: URGENT CARE | Facility: PHYSICIAN GROUP | Age: 35
End: 2025-01-10
Payer: COMMERCIAL

## 2025-01-10 VITALS
TEMPERATURE: 98.4 F | RESPIRATION RATE: 16 BRPM | HEIGHT: 62 IN | HEART RATE: 86 BPM | WEIGHT: 216 LBS | BODY MASS INDEX: 39.75 KG/M2 | SYSTOLIC BLOOD PRESSURE: 110 MMHG | DIASTOLIC BLOOD PRESSURE: 70 MMHG | OXYGEN SATURATION: 100 %

## 2025-01-10 DIAGNOSIS — J45.21 MILD INTERMITTENT ASTHMA WITH EXACERBATION: ICD-10-CM

## 2025-01-10 DIAGNOSIS — J32.9 RHINOSINUSITIS: ICD-10-CM

## 2025-01-10 DIAGNOSIS — R50.9 FEVER, UNSPECIFIED FEVER CAUSE: ICD-10-CM

## 2025-01-10 LAB
APPEARANCE UR: CLEAR
BILIRUB UR STRIP-MCNC: NEGATIVE MG/DL
COLOR UR AUTO: YELLOW
GLUCOSE UR STRIP.AUTO-MCNC: NEGATIVE MG/DL
KETONES UR STRIP.AUTO-MCNC: NEGATIVE MG/DL
LEUKOCYTE ESTERASE UR QL STRIP.AUTO: NEGATIVE
NITRITE UR QL STRIP.AUTO: NEGATIVE
PH UR STRIP.AUTO: 7 [PH] (ref 5–8)
PROT UR QL STRIP: NEGATIVE MG/DL
RBC UR QL AUTO: NORMAL
SP GR UR STRIP.AUTO: 1.01
UROBILINOGEN UR STRIP-MCNC: 0.2 MG/DL

## 2025-01-10 PROCEDURE — 81002 URINALYSIS NONAUTO W/O SCOPE: CPT | Performed by: FAMILY MEDICINE

## 2025-01-10 PROCEDURE — 3074F SYST BP LT 130 MM HG: CPT | Performed by: FAMILY MEDICINE

## 2025-01-10 PROCEDURE — 99214 OFFICE O/P EST MOD 30 MIN: CPT | Performed by: FAMILY MEDICINE

## 2025-01-10 PROCEDURE — 3078F DIAST BP <80 MM HG: CPT | Performed by: FAMILY MEDICINE

## 2025-01-10 RX ORDER — CEFDINIR 300 MG/1
300 CAPSULE ORAL 2 TIMES DAILY
Qty: 14 CAPSULE | Refills: 0 | Status: SHIPPED | OUTPATIENT
Start: 2025-01-10 | End: 2025-01-17

## 2025-01-10 RX ORDER — ALBUTEROL SULFATE 90 UG/1
2 INHALANT RESPIRATORY (INHALATION) EVERY 4 HOURS PRN
Qty: 1 EACH | Refills: 0 | Status: SHIPPED | OUTPATIENT
Start: 2025-01-10

## 2025-01-10 RX ORDER — PREDNISONE 20 MG/1
40 TABLET ORAL DAILY
Qty: 10 TABLET | Refills: 0 | Status: SHIPPED | OUTPATIENT
Start: 2025-01-10 | End: 2025-01-15

## 2025-01-10 ASSESSMENT — ENCOUNTER SYMPTOMS
VOMITING: 0
WEIGHT LOSS: 0
EYE REDNESS: 0
NAUSEA: 0
EYE DISCHARGE: 0

## 2025-01-10 ASSESSMENT — FIBROSIS 4 INDEX: FIB4 SCORE: 0.3

## 2025-01-10 NOTE — LETTER
January 10, 2025         Patient: Oseas Day   YOB: 1990   Date of Visit: 1/10/2025           To Whom it May Concern:    Oseas Day was seen in my clinic on 1/10/2025. Please excuse work absences through 1/17/2025 due to influenza with secondary infection and worsening of asthma. She may return sooner if improving and without fever for 24 hours.       Sincerely,           Alexis Aguirre M.D.  Electronically Signed

## 2025-01-10 NOTE — PROGRESS NOTES
"Subjective     Oseas Day is a 34 y.o. female who presents with Other (Fever that keeps coming and going, Kidney pain x2 days, )            Diagnosed with influenza A 1/4/2025.  Has had persistent fevers.  Tmax 2 days ago at 103.  100.4 last night however she has been taking ibuprofen/Tylenol regularly.  Left flank pain.  No dysuria.  PMH UTI and concerned about this possibility.  Sinus pressure and drainage.  Shortness of breath and wheezing.  Increased albuterol use in patient with PMH asthma.  Also notes PMH pneumonia.  No other aggravating or alleviating factors.        Review of Systems   Constitutional:  Positive for malaise/fatigue. Negative for weight loss.   Eyes:  Negative for discharge and redness.   Gastrointestinal:  Negative for nausea and vomiting.   Musculoskeletal:  Negative for joint pain.   Skin:  Negative for itching and rash.              Objective     /70 (BP Location: Right arm, Patient Position: Sitting, BP Cuff Size: Adult)   Pulse 86   Temp 36.9 °C (98.4 °F) (Temporal)   Resp 16   Ht 1.575 m (5' 2\")   Wt 98 kg (216 lb)   SpO2 100%   BMI 39.51 kg/m²      Physical Exam  Constitutional:       General: She is not in acute distress.     Appearance: She is well-developed.   HENT:      Head: Normocephalic and atraumatic.      Right Ear: Tympanic membrane normal.      Left Ear: Tympanic membrane normal.      Nose: Congestion present.      Mouth/Throat:      Comments: PND  Eyes:      Conjunctiva/sclera: Conjunctivae normal.   Cardiovascular:      Rate and Rhythm: Normal rate and regular rhythm.      Heart sounds: Normal heart sounds. No murmur heard.  Pulmonary:      Effort: Pulmonary effort is normal.      Breath sounds: Normal breath sounds. No wheezing.   Skin:     General: Skin is warm and dry.      Findings: No rash.   Neurological:      Mental Status: She is alert.                             Assessment & Plan   UA reviewed    Urgent care visit 1/4/2025 reviewed     1. " Fever, unspecified fever cause  POCT Urinalysis      2. Mild intermittent asthma with exacerbation  predniSONE (DELTASONE) 20 MG Tab    albuterol 108 (90 Base) MCG/ACT Aero Soln inhalation aerosol      3. Rhinosinusitis  cefdinir (OMNICEF) 300 MG Cap        Differential diagnosis, natural history, supportive care, and indications for immediate follow-up were discussed.     Persistent fever concerning for secondary bacterial infection.  No evidence for pneumonia on exam.  Shared decision to hold chest x-ray today.

## 2025-01-10 NOTE — LETTER
January 17, 2025         Patient: Oseas Day   YOB: 1990   Date of Visit: 1/10/2025           To Whom it May Concern:    Oseas Day was seen in my clinic on 1/10/2025. She {Return to school/sport/work:64430}    If you have any questions or concerns, please don't hesitate to call.        Sincerely,           Alexis Aguirre M.D.  Electronically Signed      Spoke to Bob Epperson in the PCU and the patient was discharged on Saturday and a 30 day event monitor was needed. I will place the order. Can we please enroll the patient.

## 2025-01-10 NOTE — LETTER
January 17, 2025         Patient: Oseas Day   YOB: 1990   Date of Visit: 1/10/2025           To Whom it May Concern:    Oseas is cleared for full duty without restrictions on 1/20/2025.      Sincerely,           Alexis Aguirre M.D.  Electronically Signed

## 2025-01-17 ENCOUNTER — TELEPHONE (OUTPATIENT)
Dept: URGENT CARE | Facility: PHYSICIAN GROUP | Age: 35
End: 2025-01-17

## 2025-01-17 NOTE — TELEPHONE ENCOUNTER
"Patient came into clinic today to bring in her child and wanted to ask for another letter for her job, they are saying the previous note was not enough and they are requesting her to bring a note that says she is \"Cleared\" to return to work. Pt states she is feeling a lot better. I did advise patient she may need to be seen in UC to be re-evaluated for a clearance letter. Please advise. Thank you.   "

## 2025-02-22 ENCOUNTER — OFFICE VISIT (OUTPATIENT)
Dept: URGENT CARE | Facility: PHYSICIAN GROUP | Age: 35
End: 2025-02-22
Payer: COMMERCIAL

## 2025-02-22 VITALS
HEIGHT: 70 IN | HEART RATE: 88 BPM | SYSTOLIC BLOOD PRESSURE: 110 MMHG | RESPIRATION RATE: 20 BRPM | TEMPERATURE: 98.2 F | DIASTOLIC BLOOD PRESSURE: 62 MMHG | BODY MASS INDEX: 29.2 KG/M2 | WEIGHT: 204 LBS | OXYGEN SATURATION: 100 %

## 2025-02-22 DIAGNOSIS — R05.1 ACUTE COUGH: ICD-10-CM

## 2025-02-22 LAB
FLUAV RNA SPEC QL NAA+PROBE: NEGATIVE
FLUBV RNA SPEC QL NAA+PROBE: NEGATIVE
RSV RNA SPEC QL NAA+PROBE: NEGATIVE
S PYO DNA SPEC NAA+PROBE: NOT DETECTED
SARS-COV-2 RNA RESP QL NAA+PROBE: NEGATIVE

## 2025-02-22 PROCEDURE — 87651 STREP A DNA AMP PROBE: CPT | Performed by: FAMILY MEDICINE

## 2025-02-22 PROCEDURE — 3074F SYST BP LT 130 MM HG: CPT | Performed by: FAMILY MEDICINE

## 2025-02-22 PROCEDURE — 99214 OFFICE O/P EST MOD 30 MIN: CPT | Performed by: FAMILY MEDICINE

## 2025-02-22 PROCEDURE — 3078F DIAST BP <80 MM HG: CPT | Performed by: FAMILY MEDICINE

## 2025-02-22 PROCEDURE — 0241U POCT CEPHEID COV-2, FLU A/B, RSV - PCR: CPT | Performed by: FAMILY MEDICINE

## 2025-02-22 RX ORDER — CODEINE PHOSPHATE AND GUAIFENESIN 10; 100 MG/5ML; MG/5ML
5 SOLUTION ORAL EVERY 6 HOURS PRN
Qty: 90 ML | Refills: 0 | Status: SHIPPED | OUTPATIENT
Start: 2025-02-22 | End: 2025-03-01

## 2025-02-22 RX ORDER — ALBUTEROL SULFATE 0.63 MG/3ML
0.63 SOLUTION RESPIRATORY (INHALATION) EVERY 4 HOURS PRN
Qty: 150 ML | Refills: 0 | Status: SHIPPED | OUTPATIENT
Start: 2025-02-22

## 2025-02-22 RX ORDER — PREDNISONE 20 MG/1
60 TABLET ORAL DAILY
Qty: 15 TABLET | Refills: 0 | Status: SHIPPED | OUTPATIENT
Start: 2025-02-22 | End: 2025-02-27

## 2025-02-22 RX ORDER — BENZONATATE 200 MG/1
200 CAPSULE ORAL 3 TIMES DAILY PRN
Qty: 45 CAPSULE | Refills: 0 | Status: SHIPPED | OUTPATIENT
Start: 2025-02-22

## 2025-02-22 ASSESSMENT — FIBROSIS 4 INDEX: FIB4 SCORE: 0.3

## 2025-02-23 ENCOUNTER — RESULTS FOLLOW-UP (OUTPATIENT)
Dept: URGENT CARE | Facility: PHYSICIAN GROUP | Age: 35
End: 2025-02-23

## 2025-02-23 NOTE — PROGRESS NOTES
CC:  presents with Pharyngitis       Pharyngitis   This is a new problem. The current episode started in the past 3 days. The problem has been unchanged. There has been subj fever. The pain is mild. Associated symptoms include a dry cough, some minor wheezing. Pertinent negatives include no abdominal pain,   diarrhea, headaches, shortness of breath or vomiting. no exposure to strep or influenza.      Social History     Tobacco Use    Smoking status: Former     Current packs/day: 0.50     Average packs/day: 0.5 packs/day for 4.0 years (2.0 ttl pk-yrs)     Types: Cigarettes    Smokeless tobacco: Never   Vaping Use    Vaping status: Every Day    Substances: Nicotine    Devices: Disposable   Substance Use Topics    Alcohol use: Not Currently     Comment: none since 2011    Drug use: Yes     Types: Intravenous     Comment: none since 2010       Past Medical History:   Diagnosis Date    Asthma     Inhaler PRN    Bowel habit changes     diarrhea    Heart burn     Indigestion     Psychiatric problem     Bipolar    Seizure disorder (HCC) 05/21/2018    epilepsy; 5/21/2018 nausea/vomiting difficulty holding down seizure medication; PCP, Dr. Shun Flowers monitors seizures       Review of Systems    HENT: Positive for sore throat  Respiratory: Negative for  sputum production and shortness of breath.    Cardiovascular: Negative for chest pain.   Gastrointestinal: Negative for nausea, vomiting, abdominal pain and diarrhea.   Genitourinary: Negative.    Neurological: Negative for dizziness and headaches.   All other systems reviewed and are negative.         Objective:   There were no vitals taken for this visit.        Physical Exam   Constitutional:   oriented to person, place, and time.  appears well-developed and well-nourished. No distress.   HENT:   Head: Normocephalic and atraumatic.   Right Ear: External ear normal.   Left Ear: External ear normal.   Nose: Mucosal edema present. Right sinus exhibits no maxillary sinus  tenderness and no frontal sinus tenderness. Left sinus exhibits no maxillary sinus tenderness and no frontal sinus tenderness.   Mouth/Throat: no posterior oropharyngeal exudate.   There is posterior oropharyngeal erythema present. No posterior oropharyngeal edema.   Tonsils 2+ bilaterally     Eyes: Conjunctivae and EOM are normal. Pupils are equal, round, and reactive to light. Right eye exhibits no discharge. Left eye exhibits no discharge. No scleral icterus.   Neck: Normal range of motion. Neck supple. No JVD present. No tracheal deviation present. No thyromegaly present.   Cardiovascular: Normal rate, regular rhythm, normal heart sounds and intact distal pulses.  Exam reveals no friction rub.    No murmur heard.  Pulmonary/Chest: Effort normal and breath sounds normal. No respiratory distress.   no wheezes.   no rales.    Musculoskeletal:  exhibits no edema.   Lymphadenopathy:    no cervical LAD  Neurological:   alert and oriented to person, place, and time.   Skin: Skin is warm and dry. No erythema.   Psychiatric:   normal mood and affect.   Nursing note and vitals reviewed.             Assessment/Plan:         1.  Cough    Suspect bronchitis    PCR negative for COVID, influenza A/B, RSV, strep throat.         - albuterol (ACCUNEB) 0.63 MG/3ML nebulizer solution; Take 3 mL by nebulization every four hours as needed for Shortness of Breath.  Dispense: 150 mL; Refill: 0  - predniSONE (DELTASONE) 20 MG Tab; Take 3 Tablets by mouth every day for 5 days.  Dispense: 15 Tablet; Refill: 0   - benzonatate (TESSALON) 200 MG capsule; Take 1 Capsule by mouth 3 times a day as needed for Cough.  Dispense: 45 Capsule; Refill: 0  - guaifenesin-codeine (ROBITUSSIN AC) Solution oral solution; Take 5 mL by mouth every 6 hours as needed for Cough for up to 7 days.  Dispense: 90 mL; Refill: 0      Narcotic use report obtained with no indication of narcotic overuse or misuse and deemed necessary for treaatment. Sedation,  dependence, and constipation precautions given. Avoid use while driving or operating heavy machinery.       Differential diagnosis, natural history, supportive care, and indications for immediate follow-up discussed. All questions answered. Patient agrees with the plan of care.     Follow-up as needed if symptoms worsen or fail to improve to PCP, Urgent care or Emergency Room.     I have personally reviewed prior external notes and test results pertinent to today's visit.  I have independently reviewed and interpreted all diagnostics ordered during this urgent care acute visit.

## 2025-07-20 ENCOUNTER — APPOINTMENT (OUTPATIENT)
Dept: RADIOLOGY | Facility: MEDICAL CENTER | Age: 35
End: 2025-07-20
Attending: EMERGENCY MEDICINE
Payer: COMMERCIAL

## 2025-07-20 ENCOUNTER — HOSPITAL ENCOUNTER (EMERGENCY)
Facility: MEDICAL CENTER | Age: 35
End: 2025-07-20
Attending: EMERGENCY MEDICINE
Payer: COMMERCIAL

## 2025-07-20 VITALS
SYSTOLIC BLOOD PRESSURE: 98 MMHG | BODY MASS INDEX: 31.58 KG/M2 | OXYGEN SATURATION: 98 % | TEMPERATURE: 97.1 F | DIASTOLIC BLOOD PRESSURE: 55 MMHG | WEIGHT: 213.19 LBS | HEART RATE: 78 BPM | RESPIRATION RATE: 16 BRPM | HEIGHT: 69 IN

## 2025-07-20 DIAGNOSIS — R10.30 LOWER ABDOMINAL PAIN: ICD-10-CM

## 2025-07-20 DIAGNOSIS — K64.9 HEMORRHOIDS, UNSPECIFIED HEMORRHOID TYPE: ICD-10-CM

## 2025-07-20 DIAGNOSIS — K62.5 RECTAL BLEEDING: Primary | ICD-10-CM

## 2025-07-20 LAB
ALBUMIN SERPL BCP-MCNC: 4.3 G/DL (ref 3.2–4.9)
ALBUMIN/GLOB SERPL: 1.4 G/DL
ALP SERPL-CCNC: 64 U/L (ref 30–99)
ALT SERPL-CCNC: 24 U/L (ref 2–50)
ANION GAP SERPL CALC-SCNC: 11 MMOL/L (ref 7–16)
APPEARANCE UR: CLEAR
AST SERPL-CCNC: 17 U/L (ref 12–45)
BASOPHILS # BLD AUTO: 0.8 % (ref 0–1.8)
BASOPHILS # BLD: 0.08 K/UL (ref 0–0.12)
BILIRUB SERPL-MCNC: 0.3 MG/DL (ref 0.1–1.5)
BILIRUB UR QL STRIP.AUTO: NEGATIVE
BUN SERPL-MCNC: 11 MG/DL (ref 8–22)
CALCIUM ALBUM COR SERPL-MCNC: 8.8 MG/DL (ref 8.5–10.5)
CALCIUM SERPL-MCNC: 9 MG/DL (ref 8.5–10.5)
CHLORIDE SERPL-SCNC: 109 MMOL/L (ref 96–112)
CO2 SERPL-SCNC: 18 MMOL/L (ref 20–33)
COLOR UR: YELLOW
CREAT SERPL-MCNC: 0.93 MG/DL (ref 0.5–1.4)
EOSINOPHIL # BLD AUTO: 0.3 K/UL (ref 0–0.51)
EOSINOPHIL NFR BLD: 3.2 % (ref 0–6.9)
ERYTHROCYTE [DISTWIDTH] IN BLOOD BY AUTOMATED COUNT: 40.4 FL (ref 35.9–50)
GFR SERPLBLD CREATININE-BSD FMLA CKD-EPI: 81 ML/MIN/1.73 M 2
GLOBULIN SER CALC-MCNC: 3 G/DL (ref 1.9–3.5)
GLUCOSE SERPL-MCNC: 90 MG/DL (ref 65–99)
GLUCOSE UR STRIP.AUTO-MCNC: NEGATIVE MG/DL
HCG SERPL QL: NEGATIVE
HCT VFR BLD AUTO: 42.7 % (ref 37–47)
HGB BLD-MCNC: 14.9 G/DL (ref 12–16)
IMM GRANULOCYTES # BLD AUTO: 0.03 K/UL (ref 0–0.11)
IMM GRANULOCYTES NFR BLD AUTO: 0.3 % (ref 0–0.9)
KETONES UR STRIP.AUTO-MCNC: NEGATIVE MG/DL
LEUKOCYTE ESTERASE UR QL STRIP.AUTO: NEGATIVE
LIPASE SERPL-CCNC: 32 U/L (ref 11–82)
LYMPHOCYTES # BLD AUTO: 3.05 K/UL (ref 1–4.8)
LYMPHOCYTES NFR BLD: 32.2 % (ref 22–41)
MCH RBC QN AUTO: 30.7 PG (ref 27–33)
MCHC RBC AUTO-ENTMCNC: 34.9 G/DL (ref 32.2–35.5)
MCV RBC AUTO: 88 FL (ref 81.4–97.8)
MICRO URNS: NORMAL
MONOCYTES # BLD AUTO: 0.88 K/UL (ref 0–0.85)
MONOCYTES NFR BLD AUTO: 9.3 % (ref 0–13.4)
NEUTROPHILS # BLD AUTO: 5.13 K/UL (ref 1.82–7.42)
NEUTROPHILS NFR BLD: 54.2 % (ref 44–72)
NITRITE UR QL STRIP.AUTO: NEGATIVE
NRBC # BLD AUTO: 0 K/UL
NRBC BLD-RTO: 0 /100 WBC (ref 0–0.2)
PH UR STRIP.AUTO: 6.5 [PH] (ref 5–8)
PLATELET # BLD AUTO: 368 K/UL (ref 164–446)
PMV BLD AUTO: 9.5 FL (ref 9–12.9)
POTASSIUM SERPL-SCNC: 4.1 MMOL/L (ref 3.6–5.5)
PROT SERPL-MCNC: 7.3 G/DL (ref 6–8.2)
PROT UR QL STRIP: NEGATIVE MG/DL
RBC # BLD AUTO: 4.85 M/UL (ref 4.2–5.4)
RBC UR QL AUTO: NEGATIVE
SODIUM SERPL-SCNC: 138 MMOL/L (ref 135–145)
SP GR UR STRIP.AUTO: 1.01
UROBILINOGEN UR STRIP.AUTO-MCNC: 0.2 EU/DL
WBC # BLD AUTO: 9.5 K/UL (ref 4.8–10.8)

## 2025-07-20 PROCEDURE — 85025 COMPLETE CBC W/AUTO DIFF WBC: CPT

## 2025-07-20 PROCEDURE — 74177 CT ABD & PELVIS W/CONTRAST: CPT

## 2025-07-20 PROCEDURE — 36415 COLL VENOUS BLD VENIPUNCTURE: CPT

## 2025-07-20 PROCEDURE — 83690 ASSAY OF LIPASE: CPT

## 2025-07-20 PROCEDURE — 84703 CHORIONIC GONADOTROPIN ASSAY: CPT

## 2025-07-20 PROCEDURE — 96374 THER/PROPH/DIAG INJ IV PUSH: CPT

## 2025-07-20 PROCEDURE — 99284 EMERGENCY DEPT VISIT MOD MDM: CPT

## 2025-07-20 PROCEDURE — 80053 COMPREHEN METABOLIC PANEL: CPT

## 2025-07-20 PROCEDURE — 81003 URINALYSIS AUTO W/O SCOPE: CPT

## 2025-07-20 PROCEDURE — 700117 HCHG RX CONTRAST REV CODE 255: Performed by: EMERGENCY MEDICINE

## 2025-07-20 PROCEDURE — 700111 HCHG RX REV CODE 636 W/ 250 OVERRIDE (IP): Mod: JZ | Performed by: EMERGENCY MEDICINE

## 2025-07-20 RX ORDER — HYDROCORTISONE ACETATE 25 MG/1
25 SUPPOSITORY RECTAL EVERY 12 HOURS
Qty: 14 SUPPOSITORY | Refills: 0 | Status: SHIPPED | OUTPATIENT
Start: 2025-07-20 | End: 2025-07-27

## 2025-07-20 RX ORDER — ACETAMINOPHEN 10 MG/ML
1000 INJECTION, SOLUTION INTRAVENOUS ONCE
Status: COMPLETED | OUTPATIENT
Start: 2025-07-20 | End: 2025-07-20

## 2025-07-20 RX ORDER — GABAPENTIN 300 MG/1
300 CAPSULE ORAL 3 TIMES DAILY PRN
COMMUNITY

## 2025-07-20 RX ORDER — IBUPROFEN 200 MG
600 TABLET ORAL
COMMUNITY

## 2025-07-20 RX ORDER — DOCUSATE SODIUM 100 MG/1
100 CAPSULE, LIQUID FILLED ORAL 2 TIMES DAILY PRN
COMMUNITY

## 2025-07-20 RX ADMIN — IOHEXOL 100 ML: 350 INJECTION, SOLUTION INTRAVENOUS at 08:45

## 2025-07-20 RX ADMIN — ACETAMINOPHEN 1000 MG: 10 INJECTION, SOLUTION INTRAVENOUS at 07:29

## 2025-07-20 ASSESSMENT — PAIN DESCRIPTION - PAIN TYPE
TYPE: ACUTE PAIN
TYPE: ACUTE PAIN

## 2025-07-20 ASSESSMENT — FIBROSIS 4 INDEX: FIB4 SCORE: 0.3

## 2025-07-20 NOTE — ED NOTES
Med rec is complete per Patient at bedside.     Allergies reviewed.    Has patient had any outside antibiotics in the last 30 days? N    Antibiotics: nitrofurantoin, doxycycline, sulfamethoxazole-trimethoprim?N    Antiparasitics include: albendazole, ivermectin, pyrantel pamoate (OTC), mebendazole and metronidazole?N    Antivirals: acyclovir, valacyclovir?N    Antifungals: voriconazole, posaconazole, and isavuconazonium (Cresemba®)?N       Any Anticoagulants (rivaroxaban, apixaban, edoxaban, dabigatran, warfarin, enoxaparin) taken in the last 14 days? N           Pharmacy/Pharmacies Pt utilizes : [facility] [phone]

## 2025-07-20 NOTE — ED NOTES
Assist RN:    Patient discharged home per ERP.  Discharge teaching and education discussed with patient. POC discussed.   Patient verbalized understanding of discharge teaching and education. No other questions at this time.     RX x 1 sent to pharmacy by MD.  PIV removed.     VSS. Patient alert and oriented. Patient arranged ride for self. Able to ambulate off unit safely with steady gait.     Unknown if ever smoked

## 2025-07-20 NOTE — DISCHARGE INSTRUCTIONS
Follow-up with your gastroenterologist, call for appointment.  Return the Emergency Department if worse or for any concerns

## 2025-07-20 NOTE — ED TRIAGE NOTES
"Chief Complaint   Patient presents with    Rectal Bleeding     Pt presents with rectal bleeding x 3 days. Pt reports associated abd pain. Reports hx hemorrhoids and pre-cancerous polys. States she was constipated last week, took OTC stool softeners with little relief. Denies straining when attempting to have BM.        BP (!) 131/91   Pulse 96   Temp 36.2 °C (97.1 °F) (Temporal)   Resp 18   Ht 1.753 m (5' 9\")   Wt 96.7 kg (213 lb 3 oz)   LMP 07/06/2025 (Approximate)   SpO2 96%   BMI 31.48 kg/m²      Pt to triage for above compliant. NADN.      Pt placed in lobby and educated on triage process. Pt encouraged to alert staff for any changes, pt verbalized understanding.     "

## 2025-07-20 NOTE — ED NOTES
Bedside report received from off going RN/tech: Simran, assumed care of patient.  POC discussed with patient. Call light within reach, all needs addressed at this time.       Fall risk interventions in place: Move the patient closer to the nurse's station, Patient's personal possessions are with in their safe reach, Place fall risk sign on patient's door, and Keep floor surfaces clean and dry (all applicable per [facility] Fall risk assessment)   Continuous monitoring: Pulse Ox or Blood Pressure  IVF/IV medications: Not Applicable   Oxygen: Room Air  Bedside sitter: Not Applicable   Isolation: Not Applicable

## 2025-07-20 NOTE — ED PROVIDER NOTES
ED Provider Note    CHIEF COMPLAINT  Chief Complaint   Patient presents with    Rectal Bleeding     Pt presents with rectal bleeding x 3 days. Pt reports associated abd pain. Reports hx hemorrhoids and pre-cancerous polys. States she was constipated last week, took OTC stool softeners with little relief. Denies straining when attempting to have BM.         EXTERNAL RECORDS REVIEWED  PDMP no active prescriptions for narcotic or sedative    HPI/ROS  LIMITATION TO HISTORY   Select: : None  OUTSIDE HISTORIAN(S):  Significant other is at bedside for discussion history and symptoms    Oseas Day is a 34 y.o. female who presents 3 days of lower abdominal pain, intermittent cramping, rectal pain, rectal bleeding.  Patient states she had colonoscopy with abnormal polyps 5 years ago.  Patient is concerned regarding possibility of the polyps having led to further complication not having had colonoscopy for the past 5 years.  No dizziness.  She has had mild dysuria for 2 days.  No flank pain.  No chest pain or difficulty breathing.  Patient notes over the past several months intermittent diarrhea and constipation.  Patient has noticed a small hemorrhoid over the past week which she believes to be part of the problem    PAST MEDICAL HISTORY   has a past medical history of Asthma, Bowel habit changes, Heart burn, Indigestion, Psychiatric problem, and Seizure disorder (HCC) (05/21/2018).    SURGICAL HISTORY   has a past surgical history that includes dental extraction(s) (2003); gastroscopy (2001); gastroscopy (5/25/2018); colonoscopy (5/25/2018); cholecystectomy (05/2018); and lap,diagnostic abdomen (N/A, 1/30/2024).    FAMILY HISTORY  History reviewed. No pertinent family history.    SOCIAL HISTORY  Social History     Tobacco Use    Smoking status: Former     Current packs/day: 0.50     Average packs/day: 0.5 packs/day for 4.0 years (2.0 ttl pk-yrs)     Types: Cigarettes    Smokeless tobacco: Never   Vaping Use    Vaping  "status: Every Day    Substances: Nicotine    Devices: Disposable   Substance and Sexual Activity    Alcohol use: Not Currently     Comment: none since 2011    Drug use: Yes     Types: Intravenous     Comment: none since 2010    Sexual activity: Not on file       CURRENT MEDICATIONS  Home Medications       Reviewed by Concha Marie R.N. (Registered Nurse) on 07/20/25 at 0631  Med List Status: Partial     Medication Last Dose Status   albuterol (ACCUNEB) 0.63 MG/3ML nebulizer solution  Active   albuterol 108 (90 Base) MCG/ACT Aero Soln inhalation aerosol  Active   albuterol 108 (90 Base) MCG/ACT Aero Soln inhalation aerosol  Active   benzonatate (TESSALON) 200 MG capsule  Active   docusate sodium (COLACE) 100 MG Cap  Active   ibuprofen (MOTRIN) 600 MG Tab  Active   ondansetron (ZOFRAN ODT) 4 MG TABLET DISPERSIBLE  Active   zonisamide (ZONEGRAN) 100 MG Cap  Active                    ALLERGIES  Allergies[1]    PHYSICAL EXAM  VITAL SIGNS: BP (!) 131/91   Pulse 96   Temp 36.2 °C (97.1 °F) (Temporal)   Resp 18   Ht 1.753 m (5' 9\")   Wt 96.7 kg (213 lb 3 oz)   LMP 07/06/2025 (Approximate)   SpO2 96%   BMI 31.48 kg/m²    GI: Mild right upper quadrant abdominal tenderness.  Mild to moderate bilateral lower abdominal tenderness.  No palpable mass or guarding.  Left upper quadrant of the abdomen is soft and nontender  Musculoskeletal: No flank tenderness  Neurologic: Strength and sensation normal  Cardiac: Regular rate, regular rhythm  Respiratory: Clear lung sounds  Skin: No jaundice    EKG/LABS  Results for orders placed or performed during the hospital encounter of 07/20/25   CBC WITH DIFFERENTIAL    Collection Time: 07/20/25  6:53 AM   Result Value Ref Range    WBC 9.5 4.8 - 10.8 K/uL    RBC 4.85 4.20 - 5.40 M/uL    Hemoglobin 14.9 12.0 - 16.0 g/dL    Hematocrit 42.7 37.0 - 47.0 %    MCV 88.0 81.4 - 97.8 fL    MCH 30.7 27.0 - 33.0 pg    MCHC 34.9 32.2 - 35.5 g/dL    RDW 40.4 35.9 - 50.0 fL    Platelet " Count 368 164 - 446 K/uL    MPV 9.5 9.0 - 12.9 fL    Neutrophils-Polys 54.20 44.00 - 72.00 %    Lymphocytes 32.20 22.00 - 41.00 %    Monocytes 9.30 0.00 - 13.40 %    Eosinophils 3.20 0.00 - 6.90 %    Basophils 0.80 0.00 - 1.80 %    Immature Granulocytes 0.30 0.00 - 0.90 %    Nucleated RBC 0.00 0.00 - 0.20 /100 WBC    Neutrophils (Absolute) 5.13 1.82 - 7.42 K/uL    Lymphs (Absolute) 3.05 1.00 - 4.80 K/uL    Monos (Absolute) 0.88 (H) 0.00 - 0.85 K/uL    Eos (Absolute) 0.30 0.00 - 0.51 K/uL    Baso (Absolute) 0.08 0.00 - 0.12 K/uL    Immature Granulocytes (abs) 0.03 0.00 - 0.11 K/uL    NRBC (Absolute) 0.00 K/uL   COMP METABOLIC PANEL    Collection Time: 07/20/25  6:53 AM   Result Value Ref Range    Sodium 138 135 - 145 mmol/L    Potassium 4.1 3.6 - 5.5 mmol/L    Chloride 109 96 - 112 mmol/L    Co2 18 (L) 20 - 33 mmol/L    Anion Gap 11.0 7.0 - 16.0    Glucose 90 65 - 99 mg/dL    Bun 11 8 - 22 mg/dL    Creatinine 0.93 0.50 - 1.40 mg/dL    Calcium 9.0 8.5 - 10.5 mg/dL    Correct Calcium 8.8 8.5 - 10.5 mg/dL    AST(SGOT) 17 12 - 45 U/L    ALT(SGPT) 24 2 - 50 U/L    Alkaline Phosphatase 64 30 - 99 U/L    Total Bilirubin 0.3 0.1 - 1.5 mg/dL    Albumin 4.3 3.2 - 4.9 g/dL    Total Protein 7.3 6.0 - 8.2 g/dL    Globulin 3.0 1.9 - 3.5 g/dL    A-G Ratio 1.4 g/dL   LIPASE    Collection Time: 07/20/25  6:53 AM   Result Value Ref Range    Lipase 32 11 - 82 U/L   HCG QUAL SERUM    Collection Time: 07/20/25  6:53 AM   Result Value Ref Range    Beta-Hcg Qualitative Serum Negative Negative   ESTIMATED GFR    Collection Time: 07/20/25  6:53 AM   Result Value Ref Range    GFR (CKD-EPI) 81 >60 mL/min/1.73 m 2   URINALYSIS (UA)    Collection Time: 07/20/25  7:10 AM    Specimen: Urine   Result Value Ref Range    Color Yellow     Character Clear     Specific Gravity 1.008 <1.035    Ph 6.5 5.0 - 8.0    Glucose Negative Negative mg/dL    Ketones Negative Negative mg/dL    Protein Negative Negative mg/dL    Bilirubin Negative Negative     Urobilinogen, Urine 0.2 <=1.0 EU/dL    Nitrite Negative Negative    Leukocyte Esterase Negative Negative    Occult Blood Negative Negative    Micro Urine Req see below        RADIOLOGY/PROCEDURES   I have independently interpreted the diagnostic imaging associated with this visit and am waiting the final reading from the radiologist.   My preliminary interpretation is as follows: CT scan of the abdomen and pelvis negative for bowel obstruction, no free air    Radiologist interpretation:  CT-ABDOMEN-PELVIS WITH   Final Result         1. 2 cm complex left ovarian cyst.      2. No change mild bile duct prominence which likely is a chronic post cholecystectomy configuration. Unchanged from 1/15/2024.      3. Normal appendix.      4. Stable small benign liver and right kidney cysts.                         COURSE & MEDICAL DECISION MAKING    ASSESSMENT, COURSE AND PLAN  Care Narrative: Patient presents with lower abdominal pain, rectal bleeding, shows no significant blood loss with normal vital signs, normal hemoglobin.  Patient has declined perianal exam for evaluation of the hemorrhoid.  Plan for Anusol HC suppositories.  Regarding lower abdominal pain, CT scan obtained to rule out appendicitis which was negative.  Incidental finding of left 2 cm ovarian cyst.  She is advised to follow-up with her gynecologist, she has agreed to do so.  The appendix is identified and normal.  Etiology of the lower abdominal pain unknown.  She is also encouraged to use stool softeners to help alleviate constipation.  In the ER, pain was treated with IV acetaminophen with moderate relief.  She has asked to avoid narcotic pain medicines.  Patient states she has her own gastroenterologist, referral was placed, she is advised to follow-up with them regarding abdominal pain and rectal bleeding.  She is advised to return the Emergency Department sooner if worse or for any concerns    DISPOSITION AND DISCUSSIONS    Escalation of care  considered, and ultimately not performed:acute inpatient care management, however at this time, the patient is most appropriate for outpatient management      Decision tools and prescription drugs considered including, but not limited to: Antibiotics were not indicated, no urinary tract infection, no intra-abdominal infection.    FINAL DIAGNOSIS  1. Rectal bleeding    2. Lower abdominal pain    3. Hemorrhoids, unspecified hemorrhoid type         Electronically signed by: Daniel Santillan M.D., 7/20/2025 7:09 AM           [1]   Allergies  Allergen Reactions    Shellfish Allergy Anaphylaxis    Coconut Flavor     Other Drug      Pt states  Monitored NARCOTICS if possible; recovering addict    Penicillins      Projectile vomiting

## 2025-07-24 NOTE — Clinical Note
REFERRAL APPROVAL NOTICE         Sent on July 24, 2025                   Oseas Day  [address]  [place] [address]                   Dear Ms. Day,    After a careful review of the medical information and benefit coverage, [facility] has processed your referral. See below for additional details.    If applicable, you must be actively enrolled with your insurance for coverage of the authorized service. If you have any questions regarding your coverage, please contact your insurance directly.    REFERRAL INFORMATION   Referral #:  [id number]  Referred-To Provider    Referred-By Provider:  Gastroenterology    Daniel Santillan M.D.   [facility]      [address] [facility] [place] [place] Emergency Room  [address]  [facility] [address]  [phone] [address] [facility] [address]  [phone]    Referral Start Date:  07/20/2025  Referral End Date:   07/20/2026             SCHEDULING  If you do not already have an appointment, please call [phone] to make an appointment.     MORE INFORMATION  If you do not already have a [employer] account, sign up at: [email or web address]  You can access your medical information, make appointments, see lab results, billing information, and more.  If you have questions regarding this referral, please contact  the [facility] Referrals department at:             [phone]. Monday - Friday 8:00AM - 5:00PM.     Sincerely,    [facility]

## (undated) DEVICE — GOWN SURGICAL X-LARGE ULTRA - FILM-REINFORCED (20/CA)

## (undated) DEVICE — TUBE SUCTION YANKAUER  1/4 X 6FT (20EA/CA)"

## (undated) DEVICE — KIT ANESTHESIA W/CIRCUIT & 3/LT BAG W/FILTER (20EA/CA)

## (undated) DEVICE — SPONGE GAUZE NON-STERILE 4X4 - (2000/CA 10PK/CA)

## (undated) DEVICE — BITE BLOCK ADULT 60FR (100EA/CA)

## (undated) DEVICE — KIT  I.V. START (100EA/CA)

## (undated) DEVICE — GLOVE, LITE (PAIR)

## (undated) DEVICE — CANISTER SUCTION 3000ML MECHANICAL FILTER AUTO SHUTOFF MEDI-VAC NONSTERILE LF DISP  (40EA/CA)

## (undated) DEVICE — SODIUM CHL IRRIGATION 0.9% 1000ML (12EA/CA)

## (undated) DEVICE — CANNULA W/ SUPPLY TUBING O2 - (50/CA)

## (undated) DEVICE — SUTURE 4-0 VICRYL PLUS FS-2 - 27 INCH (36/BX)

## (undated) DEVICE — TROCAR 5X100 BLADED ADVANCE - FIXATION (6/BX)

## (undated) DEVICE — SUCTION INSTRUMENT YANKAUER BULBOUS TIP W/O VENT (50EA/CA)

## (undated) DEVICE — GOWN WARMING STANDARD FLEX - (30/CA)

## (undated) DEVICE — WATER IRRIGATION STERILE 1000ML (12EA/CA)

## (undated) DEVICE — CANISTER SUCTION RIGID RED 1500CC (40EA/CA)

## (undated) DEVICE — BANDAID X-LARGE 2 X 4 IN LF (50EA/BX)

## (undated) DEVICE — TUBE E-T HI-LO CUFF 7.0MM (10EA/PK)

## (undated) DEVICE — SET TUBING PNEUMOCLEAR HIGH FLOW SMOKE EVACUATION (10EA/BX)

## (undated) DEVICE — SET EXTENSION WITH 2 PORTS (48EA/CA) ***PART #2C8610 IS A SUBSTITUTE*****

## (undated) DEVICE — SYRINGE SAFETY 5 ML 18 GA X 1-1/2 BLUNT LL (100/BX 4BX/CA)

## (undated) DEVICE — CATHETER IV SAFETY 20 GA X 1-1/4 (50/BX)

## (undated) DEVICE — Device

## (undated) DEVICE — SYRINGE DISP. 50CC LS - (40/BX)

## (undated) DEVICE — SUTURE 2-0 VICRYL PLUS CT-1 36 (36PK/BX)"

## (undated) DEVICE — GLOVE BIOGEL SZ 6.5 SURGICAL PF LTX (50PR/BX 4BX/CA)

## (undated) DEVICE — SYRINGE SAFETY 3 ML 18 GA X 1 1/2 BLUNT LL (100/BX 8BX/CA)

## (undated) DEVICE — TUBING CLEARLINK DUO-VENT - C-FLO (48EA/CA)

## (undated) DEVICE — NEPTUNE 4 PORT MANIFOLD - (20/PK)

## (undated) DEVICE — MASK ANESTHESIA ADULT  - (100/CA)

## (undated) DEVICE — MASK OXYGEN VNYL ADLT MED CONC WITH 7 FOOT TUBING  - (50EA/CA)

## (undated) DEVICE — LACTATED RINGERS INJ 1000 ML - (14EA/CA 60CA/PF)

## (undated) DEVICE — CANNULA O2 COMFORT SOFT EAR ADULT 7 FT TUBING (50/CA)

## (undated) DEVICE — NEEDLE INSFL 120MM 14GA VRRS - (20/BX)

## (undated) DEVICE — GOWN SURGEONS LARGE - (32/CA)

## (undated) DEVICE — PAD PREP 24 X 48 CUFFED - (100/CA)

## (undated) DEVICE — TRAY FOLEY CATHETER STATLOCK 16FR SURESTEP  (10EA/CA)

## (undated) DEVICE — SENSOR SPO2 ADULT LNCS ADTX (20/BX) ORDER ITEM #19593

## (undated) DEVICE — TROCAR Z THREAD 11 X 100 - BLADED (6/BX)

## (undated) DEVICE — GOWN SURGEONS X-LARGE - DISP. (30/CA)

## (undated) DEVICE — SUTURE GENERAL

## (undated) DEVICE — SLEEVE VASO CALF MED - (10PR/CA)

## (undated) DEVICE — SUTURE 0 VICRYL PLUS CT-2 - 27 INCH (36/BX)

## (undated) DEVICE — TOWEL STOP TIMEOUT SAFETY FLAG (40EA/CA)

## (undated) DEVICE — ELECTRODE 850 FOAM ADHESIVE - HYDROGEL RADIOTRNSPRNT (50/PK)

## (undated) DEVICE — GLOVE BIOGEL PI INDICATOR SZ 7.0 SURGICAL PF LF - (50/BX 4BX/CA)

## (undated) DEVICE — SENSOR OXIMETER ADULT SPO2 RD SET (20EA/BX)

## (undated) DEVICE — TUBE CONNECTING SUCTION - CLEAR PLASTIC STERILE 72 IN (50EA/CA)

## (undated) DEVICE — SET LEADWIRE 5 LEAD BEDSIDE DISPOSABLE ECG (1SET OF 5/EA)

## (undated) DEVICE — BASIN EMESIS DISP. - (250/CA)

## (undated) DEVICE — GLOVE BIOGEL SZ 7 SURGICAL PF LTX - (50PR/BX 4BX/CA)

## (undated) DEVICE — PAD SANITARY 11IN MAXI IND WRAPPED  (12EA/PK 24PK/CA)